# Patient Record
Sex: FEMALE | Race: WHITE | NOT HISPANIC OR LATINO
[De-identification: names, ages, dates, MRNs, and addresses within clinical notes are randomized per-mention and may not be internally consistent; named-entity substitution may affect disease eponyms.]

---

## 2017-02-15 ENCOUNTER — RECORD ABSTRACTING (OUTPATIENT)
Age: 62
End: 2017-02-15

## 2017-02-15 DIAGNOSIS — Z98.890 OTHER SPECIFIED POSTPROCEDURAL STATES: ICD-10-CM

## 2017-02-15 DIAGNOSIS — M25.562 PAIN IN LEFT KNEE: ICD-10-CM

## 2017-02-15 DIAGNOSIS — Z92.89 PERSONAL HISTORY OF OTHER MEDICAL TREATMENT: ICD-10-CM

## 2017-02-15 DIAGNOSIS — E66.3 OVERWEIGHT: ICD-10-CM

## 2017-02-15 DIAGNOSIS — N60.09 SOLITARY CYST OF UNSPECIFIED BREAST: ICD-10-CM

## 2017-02-15 DIAGNOSIS — Z78.9 OTHER SPECIFIED HEALTH STATUS: ICD-10-CM

## 2017-02-15 DIAGNOSIS — Z82.49 FAMILY HISTORY OF ISCHEMIC HEART DISEASE AND OTHER DISEASES OF THE CIRCULATORY SYSTEM: ICD-10-CM

## 2017-02-15 PROBLEM — Z00.00 ENCOUNTER FOR PREVENTIVE HEALTH EXAMINATION: Status: ACTIVE | Noted: 2017-02-15

## 2017-02-28 ENCOUNTER — RECORD ABSTRACTING (OUTPATIENT)
Age: 62
End: 2017-02-28

## 2017-02-28 DIAGNOSIS — Z87.59 PERSONAL HISTORY OF OTHER COMPLICATIONS OF PREGNANCY, CHILDBIRTH AND THE PUERPERIUM: ICD-10-CM

## 2017-02-28 DIAGNOSIS — Z78.0 ASYMPTOMATIC MENOPAUSAL STATE: ICD-10-CM

## 2017-02-28 DIAGNOSIS — O00.90 UNSPECIFIED. ECTOPIC. PREGNANCY WITHOUT INTRAUTERINE PREGNANCY: ICD-10-CM

## 2017-02-28 DIAGNOSIS — Z87.898 PERSONAL HISTORY OF OTHER SPECIFIED CONDITIONS: ICD-10-CM

## 2017-02-28 DIAGNOSIS — Z80.0 FAMILY HISTORY OF MALIGNANT NEOPLASM OF DIGESTIVE ORGANS: ICD-10-CM

## 2017-02-28 DIAGNOSIS — Z86.018 PERSONAL HISTORY OF OTHER BENIGN NEOPLASM: ICD-10-CM

## 2018-02-26 ENCOUNTER — TRANSCRIPTION ENCOUNTER (OUTPATIENT)
Age: 63
End: 2018-02-26

## 2018-02-26 ENCOUNTER — INPATIENT (INPATIENT)
Facility: HOSPITAL | Age: 63
LOS: 0 days | Discharge: HOME | End: 2018-02-26
Attending: INTERNAL MEDICINE

## 2018-02-26 VITALS
SYSTOLIC BLOOD PRESSURE: 143 MMHG | TEMPERATURE: 98 F | OXYGEN SATURATION: 96 % | DIASTOLIC BLOOD PRESSURE: 72 MMHG | HEART RATE: 71 BPM | RESPIRATION RATE: 18 BRPM

## 2018-02-26 VITALS
SYSTOLIC BLOOD PRESSURE: 171 MMHG | DIASTOLIC BLOOD PRESSURE: 78 MMHG | TEMPERATURE: 96 F | RESPIRATION RATE: 18 BRPM | HEART RATE: 73 BPM | OXYGEN SATURATION: 97 % | HEIGHT: 62 IN | WEIGHT: 293 LBS

## 2018-02-26 DIAGNOSIS — R10.13 EPIGASTRIC PAIN: ICD-10-CM

## 2018-02-26 DIAGNOSIS — R07.9 CHEST PAIN, UNSPECIFIED: ICD-10-CM

## 2018-02-26 LAB
ALBUMIN SERPL ELPH-MCNC: 4.2 G/DL — SIGNIFICANT CHANGE UP (ref 3–5.5)
ALP SERPL-CCNC: 43 U/L — SIGNIFICANT CHANGE UP (ref 30–115)
ALT FLD-CCNC: 29 U/L — SIGNIFICANT CHANGE UP (ref 0–41)
ANION GAP SERPL CALC-SCNC: 5 MMOL/L — LOW (ref 7–14)
APTT BLD: 32.3 SEC — SIGNIFICANT CHANGE UP (ref 27–39.2)
AST SERPL-CCNC: 29 U/L — SIGNIFICANT CHANGE UP (ref 0–41)
BILIRUB SERPL-MCNC: 0.9 MG/DL — SIGNIFICANT CHANGE UP (ref 0.2–1.2)
BUN SERPL-MCNC: 27 MG/DL — HIGH (ref 10–20)
CALCIUM SERPL-MCNC: 9.5 MG/DL — SIGNIFICANT CHANGE UP (ref 8.5–10.1)
CHLORIDE SERPL-SCNC: 109 MMOL/L — SIGNIFICANT CHANGE UP (ref 98–110)
CK MB BLD-MCNC: 1 % — SIGNIFICANT CHANGE UP (ref 0–4)
CK MB CFR SERPL CALC: 0.9 NG/ML — SIGNIFICANT CHANGE UP (ref 0.6–6.3)
CK SERPL-CCNC: 106 U/L — SIGNIFICANT CHANGE UP (ref 0–225)
CO2 SERPL-SCNC: 25 MMOL/L — SIGNIFICANT CHANGE UP (ref 17–32)
CREAT SERPL-MCNC: 0.6 MG/DL — LOW (ref 0.7–1.5)
GLUCOSE SERPL-MCNC: 104 MG/DL — SIGNIFICANT CHANGE UP (ref 70–110)
HCT VFR BLD CALC: 42 % — SIGNIFICANT CHANGE UP (ref 37–47)
HGB BLD-MCNC: 14.3 G/DL — SIGNIFICANT CHANGE UP (ref 14–18)
INR BLD: 0.98 RATIO — SIGNIFICANT CHANGE UP (ref 0.65–1.3)
LACTATE SERPL-SCNC: 1.2 MMOL/L — SIGNIFICANT CHANGE UP (ref 0.5–2.2)
LIDOCAIN IGE QN: 56 U/L — SIGNIFICANT CHANGE UP (ref 7–60)
MCHC RBC-ENTMCNC: 30.8 PG — SIGNIFICANT CHANGE UP (ref 27–31)
MCHC RBC-ENTMCNC: 34 G/DL — SIGNIFICANT CHANGE UP (ref 32–37)
MCV RBC AUTO: 90.5 FL — SIGNIFICANT CHANGE UP (ref 81–91)
NRBC # BLD: 0 /100 WBCS — SIGNIFICANT CHANGE UP (ref 0–0)
PLATELET # BLD AUTO: 151 K/UL — SIGNIFICANT CHANGE UP (ref 130–400)
POTASSIUM SERPL-MCNC: 4.7 MMOL/L — SIGNIFICANT CHANGE UP (ref 3.5–5)
POTASSIUM SERPL-SCNC: 4.7 MMOL/L — SIGNIFICANT CHANGE UP (ref 3.5–5)
PROT SERPL-MCNC: 7.3 G/DL — SIGNIFICANT CHANGE UP (ref 6–8)
PROTHROM AB SERPL-ACNC: 10.6 SEC — SIGNIFICANT CHANGE UP (ref 9.95–12.87)
RBC # BLD: 4.64 M/UL — SIGNIFICANT CHANGE UP (ref 4.2–5.4)
RBC # FLD: 12.8 % — SIGNIFICANT CHANGE UP (ref 11.5–14.5)
SODIUM SERPL-SCNC: 139 MMOL/L — SIGNIFICANT CHANGE UP (ref 135–146)
TROPONIN I SERPL-MCNC: <0.02 NG/ML — SIGNIFICANT CHANGE UP (ref 0–0.05)
WBC # BLD: 7.95 K/UL — SIGNIFICANT CHANGE UP (ref 4.8–10.8)
WBC # FLD AUTO: 7.95 K/UL — SIGNIFICANT CHANGE UP (ref 4.8–10.8)

## 2018-02-26 RX ORDER — ASPIRIN/CALCIUM CARB/MAGNESIUM 324 MG
325 TABLET ORAL ONCE
Qty: 0 | Refills: 0 | Status: COMPLETED | OUTPATIENT
Start: 2018-02-26 | End: 2018-02-26

## 2018-02-26 RX ORDER — PANTOPRAZOLE SODIUM 20 MG/1
40 TABLET, DELAYED RELEASE ORAL
Qty: 0 | Refills: 0 | Status: DISCONTINUED | OUTPATIENT
Start: 2018-02-26 | End: 2018-02-26

## 2018-02-26 RX ORDER — ONDANSETRON 8 MG/1
4 TABLET, FILM COATED ORAL ONCE
Qty: 0 | Refills: 0 | Status: COMPLETED | OUTPATIENT
Start: 2018-02-26 | End: 2018-02-26

## 2018-02-26 RX ORDER — SODIUM CHLORIDE 9 MG/ML
1000 INJECTION INTRAMUSCULAR; INTRAVENOUS; SUBCUTANEOUS ONCE
Qty: 0 | Refills: 0 | Status: COMPLETED | OUTPATIENT
Start: 2018-02-26 | End: 2018-02-26

## 2018-02-26 RX ORDER — FAMOTIDINE 10 MG/ML
20 INJECTION INTRAVENOUS ONCE
Qty: 0 | Refills: 0 | Status: COMPLETED | OUTPATIENT
Start: 2018-02-26 | End: 2018-02-26

## 2018-02-26 RX ORDER — PANTOPRAZOLE SODIUM 20 MG/1
1 TABLET, DELAYED RELEASE ORAL
Qty: 0 | Refills: 0 | DISCHARGE
Start: 2018-02-26

## 2018-02-26 RX ADMIN — PANTOPRAZOLE SODIUM 40 MILLIGRAM(S): 20 TABLET, DELAYED RELEASE ORAL at 06:40

## 2018-02-26 RX ADMIN — ONDANSETRON 4 MILLIGRAM(S): 8 TABLET, FILM COATED ORAL at 03:16

## 2018-02-26 RX ADMIN — SODIUM CHLORIDE 1000 MILLILITER(S): 9 INJECTION INTRAMUSCULAR; INTRAVENOUS; SUBCUTANEOUS at 04:40

## 2018-02-26 RX ADMIN — Medication 325 MILLIGRAM(S): at 03:16

## 2018-02-26 RX ADMIN — FAMOTIDINE 20 MILLIGRAM(S): 10 INJECTION INTRAVENOUS at 04:40

## 2018-02-26 NOTE — H&P ADULT - HISTORY OF PRESENT ILLNESS
· Chief Complaint: The patient is a 62y Female complaining of chest pain.	  · HPI Objective Statement: 61 y/o F without PM presents with persistent CP and abdominal pain x days. Last bowel movement today and normal without blood. no abdominal surgeries. She denies N/V/D, fevers, sweats, chills, leg pain/swelling, hx MI, SOB, SENA, cough, recent illness, palliating/provoking factors, sick contacts, recent travel.

## 2018-02-26 NOTE — H&P ADULT - ATTENDING COMMENTS
pt seen and examined independently. pt c/o epigastric burning CP, and abdominal discomfort for greater than a week. symptoms are not related to exertion, no history of CP in the past. no sob, no fever/chills, no nausea/vomiting, no diarrhea, no melena. symptoms are intermittent, exacerbated by lying down, and is described as burning. check serial CE, continue PPI, anti-reflux bland diet.   discussed with housestaff.

## 2018-02-26 NOTE — ED PROVIDER NOTE - PHYSICAL EXAMINATION
PHYSICAL EXAM:    GENERAL: Alert, appears stated age, well appearing, non-toxic  SKIN: Warm, pink and dry. MMM.   EYE: Normal lids/conjunctiva  ENT: Normal hearing, patent oropharynx without erythema or exudate  Pulm: Bilateral BS, normal resp effort, no wheezes, stridor, or retractions  CV: RRR, no M/R/G, 2+ pulses throughout  Abd: soft, non-distended, no hepatosplenomegaly. +epigastric TTP. no CVA tenderness.   Mskel: no erythema, cyanosis, edema  Neuro: AAOx3, no sensory/motor deficits. No speech slurring, pronator drift, facial asymmetry. 5/5 strength throughout. normal gait.

## 2018-02-26 NOTE — ED PROVIDER NOTE - OBJECTIVE STATEMENT
61 y/o F without PM presents with persistent CP and abdominal pain x days. Last bowel movement today and normal without blood. no abdominal surgeries. She denies N/V/D, fevers, sweats, chills, leg pain/swelling, hx MI, SOB, SENA, cough, recent illness, palliating/provoking factors, sick contacts, recent travel.

## 2018-02-26 NOTE — ED PROVIDER NOTE - NS ED ROS FT
Review of Systems    Constitutional: (-) fever  Eyes/ENT: (-) blurry vision  Cardiovascular: (+) chest pain, (-) syncope  Respiratory: (-) cough, (-) shortness of breath  Gastrointestinal: (-) vomiting, (-) diarrhea  Musculoskeletal: (-) neck pain, (-) back pain  Integumentary: (-) rash, (-) edema  Neurological: (-) headache, (-) altered mental status

## 2018-02-26 NOTE — ED PROVIDER NOTE - MEDICAL DECISION MAKING DETAILS
I personally evaluated the patient. I reviewed the Resident’s or Physician Assistant’s note (as assigned above), and agree with the findings and plan except as documented in my note.  Chart reviewed. H/O GERD presents with burning epigastric/chest pain for few days. Exam shows alert patient in no distress, HEENT NCAT, lungs clear, RR s!s@, abdomens oft mild epigastric tenderness +BS, no rebound guarding, no CCE. Labs, EKG, CXR and CT abdo negative. Will admit to low risk tele r/o ACS.

## 2018-02-26 NOTE — H&P ADULT - NSHPPHYSICALEXAM_GEN_ALL_CORE
GENERAL:  61y/o Female NAD, resting comfortably.  HEAD:  Atraumatic, Normocephalic  EYES: EOMI, PERRLA, conjunctiva and sclera clear  NECK: Supple, No JVD, no cervical lymphadenopathy, non-tender  CHEST/LUNG: Clear to auscultation bilaterally; No wheeze, rhonchi, or rales  HEART: Regular rate and rhythm; S1&S2  ABDOMEN: Soft, Nontender, Nondistended x 4 quadrants; Bowel sounds present  EXTREMITIES:   Peripheral Pulses Present, No clubbing, no cyanosis, or no edema, no calf tenderness  PSYCH: AAOx3, cooperative, appropriate  NEUROLOGY: WNL  SKIN: WNL

## 2018-02-26 NOTE — DISCHARGE NOTE ADULT - MEDICATION SUMMARY - MEDICATIONS TO TAKE
I will START or STAY ON the medications listed below when I get home from the hospital:    enalapril  -- Indication: For Hypertension    pantoprazole 40 mg oral delayed release tablet  -- 1 tab(s) by mouth once a day (before a meal)  -- Indication: For CHEST PAIN;EPIGASTRIC ABDOMINAL PAIN

## 2018-02-26 NOTE — DISCHARGE NOTE ADULT - PLAN OF CARE
resolution of symptoms continue meds, follow up with PMD. anti-reflux bland diet. follow up with PMD. c/w PPI

## 2018-02-26 NOTE — DISCHARGE NOTE ADULT - HOSPITAL COURSE
pt admitted with epigastric burning abdominal discomfort, exacerbated with foods and lying down. cardiac enzymes negative. pt may benefit from outpatient EGD. c/w PPI, avoid acidic foods/beverages. anti-reflux bland diet. continue meds, follow up with PMD.

## 2018-02-26 NOTE — H&P ADULT - ASSESSMENT
63 y/o c/o chest pain radiating to left arm and epigastric pain. 61 y/o c/o epigastric burning chest pain, abdominal pain

## 2018-02-26 NOTE — DISCHARGE NOTE ADULT - PATIENT PORTAL LINK FT
You can access the [facility] Patient Portal, offered by [facility], by registering with the following website: [email or web address]

## 2018-02-26 NOTE — DISCHARGE NOTE ADULT - CARE PLAN
Principal Discharge DX:	Epigastric abdominal pain  Goal:	resolution of symptoms  Assessment and plan of treatment:	continue meds, follow up with PMD. anti-reflux bland diet. follow up with PMD. c/w PPI

## 2018-02-26 NOTE — H&P ADULT - NSHPLABSRESULTS_GEN_ALL_CORE
14.3   7.95  )-----------( 151      ( 26 Feb 2018 01:50 )             42.0       02-26    139  |  109  |  27<H>  ----------------------------<  104  4.7   |  25  |  0.6<L>    Ca    9.5      26 Feb 2018 01:50    TPro  7.3  /  Alb  4.2  /  TBili  0.9  /  DBili  x   /  AST  29  /  ALT  29  /  AlkPhos  43  02-26                  PT/INR - ( 26 Feb 2018 01:50 )   PT: 10.60 sec;   INR: 0.98 ratio         PTT - ( 26 Feb 2018 01:50 )  PTT:32.3 sec    Lactate Trend  02-26 @ 01:50 Lactate:1.2       CARDIAC MARKERS ( 26 Feb 2018 01:50 )  <0.02 ng/mL / x     / 106 U/L / x     / 0.9 ng/mL        CAPILLARY BLOOD GLUCOSE

## 2018-02-28 DIAGNOSIS — R07.9 CHEST PAIN, UNSPECIFIED: ICD-10-CM

## 2018-02-28 DIAGNOSIS — R10.13 EPIGASTRIC PAIN: ICD-10-CM

## 2019-11-23 ENCOUNTER — EMERGENCY (EMERGENCY)
Facility: HOSPITAL | Age: 64
LOS: 0 days | Discharge: HOME | End: 2019-11-23
Attending: EMERGENCY MEDICINE | Admitting: EMERGENCY MEDICINE
Payer: SUBSIDIZED

## 2019-11-23 VITALS
TEMPERATURE: 98 F | SYSTOLIC BLOOD PRESSURE: 204 MMHG | DIASTOLIC BLOOD PRESSURE: 88 MMHG | RESPIRATION RATE: 17 BRPM | HEART RATE: 66 BPM | OXYGEN SATURATION: 98 %

## 2019-11-23 VITALS
RESPIRATION RATE: 18 BRPM | HEART RATE: 65 BPM | TEMPERATURE: 97 F | OXYGEN SATURATION: 96 % | SYSTOLIC BLOOD PRESSURE: 145 MMHG | DIASTOLIC BLOOD PRESSURE: 80 MMHG

## 2019-11-23 DIAGNOSIS — Z88.0 ALLERGY STATUS TO PENICILLIN: ICD-10-CM

## 2019-11-23 DIAGNOSIS — R10.32 LEFT LOWER QUADRANT PAIN: ICD-10-CM

## 2019-11-23 DIAGNOSIS — R10.9 UNSPECIFIED ABDOMINAL PAIN: ICD-10-CM

## 2019-11-23 DIAGNOSIS — R11.2 NAUSEA WITH VOMITING, UNSPECIFIED: ICD-10-CM

## 2019-11-23 DIAGNOSIS — R10.12 LEFT UPPER QUADRANT PAIN: ICD-10-CM

## 2019-11-23 PROBLEM — I10 ESSENTIAL (PRIMARY) HYPERTENSION: Chronic | Status: ACTIVE | Noted: 2018-02-26

## 2019-11-23 LAB
ALBUMIN SERPL ELPH-MCNC: 4.5 G/DL — SIGNIFICANT CHANGE UP (ref 3.5–5.2)
ALP SERPL-CCNC: 46 U/L — SIGNIFICANT CHANGE UP (ref 30–115)
ALT FLD-CCNC: 19 U/L — SIGNIFICANT CHANGE UP (ref 0–41)
ANION GAP SERPL CALC-SCNC: 17 MMOL/L — HIGH (ref 7–14)
APPEARANCE UR: CLEAR — SIGNIFICANT CHANGE UP
AST SERPL-CCNC: 22 U/L — SIGNIFICANT CHANGE UP (ref 0–41)
BASOPHILS # BLD AUTO: 0.04 K/UL — SIGNIFICANT CHANGE UP (ref 0–0.2)
BASOPHILS NFR BLD AUTO: 0.7 % — SIGNIFICANT CHANGE UP (ref 0–1)
BILIRUB SERPL-MCNC: 0.6 MG/DL — SIGNIFICANT CHANGE UP (ref 0.2–1.2)
BILIRUB UR-MCNC: NEGATIVE — SIGNIFICANT CHANGE UP
BUN SERPL-MCNC: 18 MG/DL — SIGNIFICANT CHANGE UP (ref 10–20)
CALCIUM SERPL-MCNC: 9.4 MG/DL — SIGNIFICANT CHANGE UP (ref 8.5–10.1)
CHLORIDE SERPL-SCNC: 99 MMOL/L — SIGNIFICANT CHANGE UP (ref 98–110)
CO2 SERPL-SCNC: 21 MMOL/L — SIGNIFICANT CHANGE UP (ref 17–32)
COLOR SPEC: COLORLESS — SIGNIFICANT CHANGE UP
CREAT SERPL-MCNC: 0.7 MG/DL — SIGNIFICANT CHANGE UP (ref 0.7–1.5)
DIFF PNL FLD: NEGATIVE — SIGNIFICANT CHANGE UP
EOSINOPHIL # BLD AUTO: 0.02 K/UL — SIGNIFICANT CHANGE UP (ref 0–0.7)
EOSINOPHIL NFR BLD AUTO: 0.4 % — SIGNIFICANT CHANGE UP (ref 0–8)
GLUCOSE SERPL-MCNC: 131 MG/DL — HIGH (ref 70–99)
GLUCOSE UR QL: NEGATIVE — SIGNIFICANT CHANGE UP
HCT VFR BLD CALC: 40.2 % — SIGNIFICANT CHANGE UP (ref 37–47)
HGB BLD-MCNC: 13.7 G/DL — SIGNIFICANT CHANGE UP (ref 12–16)
IMM GRANULOCYTES NFR BLD AUTO: 0.2 % — SIGNIFICANT CHANGE UP (ref 0.1–0.3)
KETONES UR-MCNC: ABNORMAL
LACTATE SERPL-SCNC: 1.4 MMOL/L — SIGNIFICANT CHANGE UP (ref 0.5–2.2)
LEUKOCYTE ESTERASE UR-ACNC: NEGATIVE — SIGNIFICANT CHANGE UP
LIDOCAIN IGE QN: 50 U/L — SIGNIFICANT CHANGE UP (ref 7–60)
LYMPHOCYTES # BLD AUTO: 1.28 K/UL — SIGNIFICANT CHANGE UP (ref 1.2–3.4)
LYMPHOCYTES # BLD AUTO: 22.6 % — SIGNIFICANT CHANGE UP (ref 20.5–51.1)
MCHC RBC-ENTMCNC: 31.4 PG — HIGH (ref 27–31)
MCHC RBC-ENTMCNC: 34.1 G/DL — SIGNIFICANT CHANGE UP (ref 32–37)
MCV RBC AUTO: 92 FL — SIGNIFICANT CHANGE UP (ref 81–99)
MONOCYTES # BLD AUTO: 0.17 K/UL — SIGNIFICANT CHANGE UP (ref 0.1–0.6)
MONOCYTES NFR BLD AUTO: 3 % — SIGNIFICANT CHANGE UP (ref 1.7–9.3)
NEUTROPHILS # BLD AUTO: 4.15 K/UL — SIGNIFICANT CHANGE UP (ref 1.4–6.5)
NEUTROPHILS NFR BLD AUTO: 73.1 % — SIGNIFICANT CHANGE UP (ref 42.2–75.2)
NITRITE UR-MCNC: NEGATIVE — SIGNIFICANT CHANGE UP
NRBC # BLD: 0 /100 WBCS — SIGNIFICANT CHANGE UP (ref 0–0)
PH UR: 6.5 — SIGNIFICANT CHANGE UP (ref 5–8)
PLATELET # BLD AUTO: 154 K/UL — SIGNIFICANT CHANGE UP (ref 130–400)
POTASSIUM SERPL-MCNC: 3.7 MMOL/L — SIGNIFICANT CHANGE UP (ref 3.5–5)
POTASSIUM SERPL-SCNC: 3.7 MMOL/L — SIGNIFICANT CHANGE UP (ref 3.5–5)
PROT SERPL-MCNC: 7.1 G/DL — SIGNIFICANT CHANGE UP (ref 6–8)
PROT UR-MCNC: NEGATIVE — SIGNIFICANT CHANGE UP
RBC # BLD: 4.37 M/UL — SIGNIFICANT CHANGE UP (ref 4.2–5.4)
RBC # FLD: 12.5 % — SIGNIFICANT CHANGE UP (ref 11.5–14.5)
SODIUM SERPL-SCNC: 137 MMOL/L — SIGNIFICANT CHANGE UP (ref 135–146)
SP GR SPEC: 1.02 — SIGNIFICANT CHANGE UP (ref 1.01–1.02)
UROBILINOGEN FLD QL: SIGNIFICANT CHANGE UP
WBC # BLD: 5.67 K/UL — SIGNIFICANT CHANGE UP (ref 4.8–10.8)
WBC # FLD AUTO: 5.67 K/UL — SIGNIFICANT CHANGE UP (ref 4.8–10.8)

## 2019-11-23 PROCEDURE — 71046 X-RAY EXAM CHEST 2 VIEWS: CPT | Mod: 26

## 2019-11-23 PROCEDURE — 99285 EMERGENCY DEPT VISIT HI MDM: CPT

## 2019-11-23 PROCEDURE — 93010 ELECTROCARDIOGRAM REPORT: CPT

## 2019-11-23 PROCEDURE — 74177 CT ABD & PELVIS W/CONTRAST: CPT | Mod: 26

## 2019-11-23 RX ORDER — ONDANSETRON 8 MG/1
4 TABLET, FILM COATED ORAL ONCE
Refills: 0 | Status: COMPLETED | OUTPATIENT
Start: 2019-11-23 | End: 2019-11-23

## 2019-11-23 RX ORDER — SODIUM CHLORIDE 9 MG/ML
1000 INJECTION INTRAMUSCULAR; INTRAVENOUS; SUBCUTANEOUS ONCE
Refills: 0 | Status: COMPLETED | OUTPATIENT
Start: 2019-11-23 | End: 2019-11-23

## 2019-11-23 RX ORDER — MORPHINE SULFATE 50 MG/1
4 CAPSULE, EXTENDED RELEASE ORAL ONCE
Refills: 0 | Status: DISCONTINUED | OUTPATIENT
Start: 2019-11-23 | End: 2019-11-23

## 2019-11-23 RX ADMIN — SODIUM CHLORIDE 1000 MILLILITER(S): 9 INJECTION INTRAMUSCULAR; INTRAVENOUS; SUBCUTANEOUS at 12:23

## 2019-11-23 RX ADMIN — ONDANSETRON 4 MILLIGRAM(S): 8 TABLET, FILM COATED ORAL at 12:19

## 2019-11-23 RX ADMIN — MORPHINE SULFATE 4 MILLIGRAM(S): 50 CAPSULE, EXTENDED RELEASE ORAL at 12:19

## 2019-11-23 NOTE — ED ADULT NURSE NOTE - NSIMPLEMENTINTERV_GEN_ALL_ED
Implemented All Fall Risk Interventions:  [place] to call system. Call bell, personal items and telephone within reach. Instruct patient to call for assistance. Room bathroom lighting operational. Non-slip footwear when patient is off stretcher. Physically safe environment: no spills, clutter or unnecessary equipment. Stretcher in lowest position, wheels locked, appropriate side rails in place. Provide visual cue, wrist band, yellow gown, etc. Monitor gait and stability. Monitor for mental status changes and reorient to person, place, and time. Review medications for side effects contributing to fall risk. Reinforce activity limits and safety measures with patient and family.

## 2019-11-23 NOTE — ED PROVIDER NOTE - NS ED ROS FT
Review of Systems   Constitutional:  No Weight Change, No Fever, No Chills, No weakness    ENT/Mouth:  No Nasal Congestion, No Hoarseness, No sore throat, No Rhinorrhea, No Swallowing Difficulty  Eyes:  No Eye Pain, No Swelling, No Redness, No Discharge, No Vision Changes  Cardiovascular:  No Chest Pain, No palpitations, No Dyspnea on Exertion, No Orthopnea, No  Respiratory:  No SOB, No Cough, No Wheezing  Gastrointestinal:  + Nausea, + Vomiting, No Diarrhea, No Constipation, + abdominal pain, No melena or bright red blood per rectum  Genitourinary:  No Dysuria, No Urinary Frequency, No Hematuria, No Urinary Incontinence,  Musculoskeletal:  No Arthralgias, No Myalgias, No Joint Swelling, No Joint Stiffness,  Skin:  No rashes

## 2019-11-23 NOTE — ED PROVIDER NOTE - CARE PLAN
Assessment and plan of treatment:	63 yo F presents to Ed for abdominal pain. Concern for diverticulitis. SBO considered but patient is having normal BM.   Will give pain medication, labs, and CT scan.

## 2019-11-23 NOTE — ED PROVIDER NOTE - OBJECTIVE STATEMENT
65 yo F presents to ED for abdominal pain that started 3 days ago but has gotten progressively worse. Patient states the pain is on the L side and is non-radiating. Associated with nausea and vomiting. Pain is worse with movement, palpation and laying on her L side. No diarrhea or constipation. Normal flatus. No dysuria or hematuria. No history of kidney stones.   Past abdominal surgical history significant for laparoscopic cholecystectomy.   No CP or SOB. No back pain.

## 2019-11-23 NOTE — ED PROVIDER NOTE - PHYSICAL EXAMINATION
Const: Well nourished, well developed, appears stated age. Patient appears uncomfortable   Eyes: PERRL, no conjunctival injection  HENT:  Neck supple without meningismus   CV: RRR, Warm, well-perfused extremities  RESP: CTA B/L, no tachypnea   GI: soft, L sided abdominal pain in upper and lower quadrants. No CVA tenderness.   MSK: No gross deformities appreciated  Skin: Warm, dry. No rashes  Neuro: Alert, CNs II-XII grossly intact. Sensation and motor function of extremities grossly intact.  Psych: Appropriate mood and affect.

## 2019-11-23 NOTE — ED PROVIDER NOTE - PLAN OF CARE
65 yo F presents to Ed for abdominal pain. Concern for diverticulitis. SBO considered but patient is having normal BM.   Will give pain medication, labs, and CT scan.

## 2019-11-23 NOTE — ED PROVIDER NOTE - PATIENT PORTAL LINK FT
You can access the FollowMyHealth Patient Portal offered by [facility] by registering at the following website: [email or web address]. By joining [employer]’s FollowMyHealth portal, you will also be able to view your health information using other applications (apps) compatible with our system.

## 2020-12-15 ENCOUNTER — RESULT REVIEW (OUTPATIENT)
Age: 65
End: 2020-12-15

## 2020-12-15 ENCOUNTER — OUTPATIENT (OUTPATIENT)
Dept: OUTPATIENT SERVICES | Facility: HOSPITAL | Age: 65
LOS: 1 days | Discharge: HOME | End: 2020-12-15
Payer: MEDICARE

## 2020-12-15 VITALS
DIASTOLIC BLOOD PRESSURE: 74 MMHG | WEIGHT: 194.01 LBS | RESPIRATION RATE: 16 BRPM | TEMPERATURE: 98 F | OXYGEN SATURATION: 96 % | HEIGHT: 63 IN | HEART RATE: 69 BPM | SYSTOLIC BLOOD PRESSURE: 153 MMHG

## 2020-12-15 DIAGNOSIS — Z90.49 ACQUIRED ABSENCE OF OTHER SPECIFIED PARTS OF DIGESTIVE TRACT: Chronic | ICD-10-CM

## 2020-12-15 DIAGNOSIS — M17.11 UNILATERAL PRIMARY OSTEOARTHRITIS, RIGHT KNEE: ICD-10-CM

## 2020-12-15 DIAGNOSIS — Z01.818 ENCOUNTER FOR OTHER PREPROCEDURAL EXAMINATION: ICD-10-CM

## 2020-12-15 DIAGNOSIS — Z87.59 PERSONAL HISTORY OF OTHER COMPLICATIONS OF PREGNANCY, CHILDBIRTH AND THE PUERPERIUM: Chronic | ICD-10-CM

## 2020-12-15 LAB
A1C WITH ESTIMATED AVERAGE GLUCOSE RESULT: 5.8 % — HIGH (ref 4–5.6)
ALBUMIN SERPL ELPH-MCNC: 4.2 G/DL — SIGNIFICANT CHANGE UP (ref 3.5–5.2)
ALP SERPL-CCNC: 56 U/L — SIGNIFICANT CHANGE UP (ref 30–115)
ALT FLD-CCNC: 17 U/L — SIGNIFICANT CHANGE UP (ref 0–41)
ANION GAP SERPL CALC-SCNC: 9 MMOL/L — SIGNIFICANT CHANGE UP (ref 7–14)
APTT BLD: 36.8 SEC — SIGNIFICANT CHANGE UP (ref 27–39.2)
AST SERPL-CCNC: 23 U/L — SIGNIFICANT CHANGE UP (ref 0–41)
BASOPHILS # BLD AUTO: 0.06 K/UL — SIGNIFICANT CHANGE UP (ref 0–0.2)
BASOPHILS NFR BLD AUTO: 1.2 % — HIGH (ref 0–1)
BILIRUB SERPL-MCNC: 0.4 MG/DL — SIGNIFICANT CHANGE UP (ref 0.2–1.2)
BUN SERPL-MCNC: 20 MG/DL — SIGNIFICANT CHANGE UP (ref 10–20)
CALCIUM SERPL-MCNC: 9.8 MG/DL — SIGNIFICANT CHANGE UP (ref 8.5–10.1)
CHLORIDE SERPL-SCNC: 103 MMOL/L — SIGNIFICANT CHANGE UP (ref 98–110)
CO2 SERPL-SCNC: 28 MMOL/L — SIGNIFICANT CHANGE UP (ref 17–32)
CREAT SERPL-MCNC: 0.9 MG/DL — SIGNIFICANT CHANGE UP (ref 0.7–1.5)
EOSINOPHIL # BLD AUTO: 0.25 K/UL — SIGNIFICANT CHANGE UP (ref 0–0.7)
EOSINOPHIL NFR BLD AUTO: 4.8 % — SIGNIFICANT CHANGE UP (ref 0–8)
ESTIMATED AVERAGE GLUCOSE: 120 MG/DL — HIGH (ref 68–114)
GLUCOSE SERPL-MCNC: 111 MG/DL — HIGH (ref 70–99)
HCT VFR BLD CALC: 38.5 % — SIGNIFICANT CHANGE UP (ref 37–47)
HGB BLD-MCNC: 12.9 G/DL — SIGNIFICANT CHANGE UP (ref 12–16)
IMM GRANULOCYTES NFR BLD AUTO: 0.2 % — SIGNIFICANT CHANGE UP (ref 0.1–0.3)
INR BLD: 0.92 RATIO — SIGNIFICANT CHANGE UP (ref 0.65–1.3)
LYMPHOCYTES # BLD AUTO: 1.67 K/UL — SIGNIFICANT CHANGE UP (ref 1.2–3.4)
LYMPHOCYTES # BLD AUTO: 32.4 % — SIGNIFICANT CHANGE UP (ref 20.5–51.1)
MCHC RBC-ENTMCNC: 30.8 PG — SIGNIFICANT CHANGE UP (ref 27–31)
MCHC RBC-ENTMCNC: 33.5 G/DL — SIGNIFICANT CHANGE UP (ref 32–37)
MCV RBC AUTO: 91.9 FL — SIGNIFICANT CHANGE UP (ref 81–99)
MONOCYTES # BLD AUTO: 0.4 K/UL — SIGNIFICANT CHANGE UP (ref 0.1–0.6)
MONOCYTES NFR BLD AUTO: 7.8 % — SIGNIFICANT CHANGE UP (ref 1.7–9.3)
MRSA PCR RESULT.: NEGATIVE — SIGNIFICANT CHANGE UP
NEUTROPHILS # BLD AUTO: 2.77 K/UL — SIGNIFICANT CHANGE UP (ref 1.4–6.5)
NEUTROPHILS NFR BLD AUTO: 53.6 % — SIGNIFICANT CHANGE UP (ref 42.2–75.2)
NRBC # BLD: 0 /100 WBCS — SIGNIFICANT CHANGE UP (ref 0–0)
PLATELET # BLD AUTO: 162 K/UL — SIGNIFICANT CHANGE UP (ref 130–400)
POTASSIUM SERPL-MCNC: 4.1 MMOL/L — SIGNIFICANT CHANGE UP (ref 3.5–5)
POTASSIUM SERPL-SCNC: 4.1 MMOL/L — SIGNIFICANT CHANGE UP (ref 3.5–5)
PROT SERPL-MCNC: 7.1 G/DL — SIGNIFICANT CHANGE UP (ref 6–8)
PROTHROM AB SERPL-ACNC: 10.6 SEC — SIGNIFICANT CHANGE UP (ref 9.95–12.87)
RBC # BLD: 4.19 M/UL — LOW (ref 4.2–5.4)
RBC # FLD: 12.9 % — SIGNIFICANT CHANGE UP (ref 11.5–14.5)
SODIUM SERPL-SCNC: 140 MMOL/L — SIGNIFICANT CHANGE UP (ref 135–146)
WBC # BLD: 5.16 K/UL — SIGNIFICANT CHANGE UP (ref 4.8–10.8)
WBC # FLD AUTO: 5.16 K/UL — SIGNIFICANT CHANGE UP (ref 4.8–10.8)

## 2020-12-15 PROCEDURE — 93010 ELECTROCARDIOGRAM REPORT: CPT

## 2020-12-15 PROCEDURE — 72170 X-RAY EXAM OF PELVIS: CPT | Mod: 26

## 2020-12-15 PROCEDURE — 71046 X-RAY EXAM CHEST 2 VIEWS: CPT | Mod: 26

## 2020-12-15 PROCEDURE — 73562 X-RAY EXAM OF KNEE 3: CPT | Mod: 26,RT

## 2020-12-15 NOTE — H&P PST ADULT - NSICDXFAMILYHX_GEN_ALL_CORE_FT
FAMILY HISTORY:  Father  Still living? Unknown  Family history of esophageal cancer, Age at diagnosis: Age Unknown

## 2020-12-15 NOTE — H&P PST ADULT - REASON FOR ADMISSION
66 yo female presents for PAST in preparation for right total knee replacement on 1/4/2021 under regional anesthesia by Dr. Ahuja

## 2020-12-15 NOTE — H&P PST ADULT - HISTORY OF PRESENT ILLNESS
Pt has hx of OA. Pt complains of pain rated 9/10 of right knee and states "pain prevents me from walking properly." Denies any chest pain, difficulty breathing, SOB, palpitations, dysuria, URI, or any other infections in the last 2 weeks. Denies any recent travel, contact, or exposure to any persons with known or suspected COVID-19. Pt also denies COVID testing within the last 2 weeks. Denies any suicidal or homicidal ideations. Pt advised to self quarantine until day of procedure. Exercise tolerance of 1 flights of stairs without dyspnea. PRISCA reviewed with patient. Pt verbalized understanding of all pre-operative instructions.    Anesthesia Alert  NO--Difficult Airway  NO--History of neck surgery or radiation  NO--Limited ROM of neck  NO--History of Malignant hyperthermia  NO--No personal or family history of Pseudocholinesterase deficiency.  NO--Prior Anesthesia Complication  NO--Latex Allergy  NO--Loose teeth  NO--History of Rheumatoid Arthritis  NO--PRISCA  NO--Other_____

## 2021-01-01 ENCOUNTER — OUTPATIENT (OUTPATIENT)
Dept: OUTPATIENT SERVICES | Facility: HOSPITAL | Age: 66
LOS: 1 days | Discharge: HOME | End: 2021-01-01

## 2021-01-01 DIAGNOSIS — Z11.59 ENCOUNTER FOR SCREENING FOR OTHER VIRAL DISEASES: ICD-10-CM

## 2021-01-01 DIAGNOSIS — Z90.49 ACQUIRED ABSENCE OF OTHER SPECIFIED PARTS OF DIGESTIVE TRACT: Chronic | ICD-10-CM

## 2021-01-01 DIAGNOSIS — Z87.59 PERSONAL HISTORY OF OTHER COMPLICATIONS OF PREGNANCY, CHILDBIRTH AND THE PUERPERIUM: Chronic | ICD-10-CM

## 2021-01-01 PROBLEM — F32.9 MAJOR DEPRESSIVE DISORDER, SINGLE EPISODE, UNSPECIFIED: Chronic | Status: ACTIVE | Noted: 2020-12-15

## 2021-01-01 PROBLEM — K21.9 GASTRO-ESOPHAGEAL REFLUX DISEASE WITHOUT ESOPHAGITIS: Chronic | Status: ACTIVE | Noted: 2020-12-15

## 2021-01-04 ENCOUNTER — OUTPATIENT (OUTPATIENT)
Dept: OUTPATIENT SERVICES | Facility: HOSPITAL | Age: 66
LOS: 1 days | Discharge: HOME | End: 2021-01-04

## 2021-01-04 ENCOUNTER — RESULT REVIEW (OUTPATIENT)
Age: 66
End: 2021-01-04

## 2021-01-04 ENCOUNTER — INPATIENT (INPATIENT)
Facility: HOSPITAL | Age: 66
LOS: 0 days | Discharge: HOME HEALTH | End: 2021-01-05
Attending: ORTHOPAEDIC SURGERY | Admitting: ORTHOPAEDIC SURGERY
Payer: MEDICARE

## 2021-01-04 VITALS
WEIGHT: 184.97 LBS | HEART RATE: 71 BPM | HEIGHT: 62 IN | RESPIRATION RATE: 18 BRPM | TEMPERATURE: 96 F | OXYGEN SATURATION: 96 % | SYSTOLIC BLOOD PRESSURE: 156 MMHG | DIASTOLIC BLOOD PRESSURE: 70 MMHG

## 2021-01-04 DIAGNOSIS — F32.9 MAJOR DEPRESSIVE DISORDER, SINGLE EPISODE, UNSPECIFIED: ICD-10-CM

## 2021-01-04 DIAGNOSIS — Z90.49 ACQUIRED ABSENCE OF OTHER SPECIFIED PARTS OF DIGESTIVE TRACT: Chronic | ICD-10-CM

## 2021-01-04 DIAGNOSIS — Z88.0 ALLERGY STATUS TO PENICILLIN: ICD-10-CM

## 2021-01-04 DIAGNOSIS — M17.11 UNILATERAL PRIMARY OSTEOARTHRITIS, RIGHT KNEE: ICD-10-CM

## 2021-01-04 DIAGNOSIS — Z87.59 PERSONAL HISTORY OF OTHER COMPLICATIONS OF PREGNANCY, CHILDBIRTH AND THE PUERPERIUM: Chronic | ICD-10-CM

## 2021-01-04 DIAGNOSIS — Z79.82 LONG TERM (CURRENT) USE OF ASPIRIN: ICD-10-CM

## 2021-01-04 DIAGNOSIS — I10 ESSENTIAL (PRIMARY) HYPERTENSION: ICD-10-CM

## 2021-01-04 PROCEDURE — 73560 X-RAY EXAM OF KNEE 1 OR 2: CPT | Mod: 26,RT

## 2021-01-04 PROCEDURE — 99233 SBSQ HOSP IP/OBS HIGH 50: CPT

## 2021-01-04 PROCEDURE — 88311 DECALCIFY TISSUE: CPT | Mod: 26

## 2021-01-04 PROCEDURE — 88304 TISSUE EXAM BY PATHOLOGIST: CPT | Mod: 26

## 2021-01-04 RX ORDER — ACETAMINOPHEN 500 MG
650 TABLET ORAL EVERY 6 HOURS
Refills: 0 | Status: DISCONTINUED | OUTPATIENT
Start: 2021-01-04 | End: 2021-01-05

## 2021-01-04 RX ORDER — ONDANSETRON 8 MG/1
4 TABLET, FILM COATED ORAL EVERY 6 HOURS
Refills: 0 | Status: DISCONTINUED | OUTPATIENT
Start: 2021-01-04 | End: 2021-01-05

## 2021-01-04 RX ORDER — CHLORHEXIDINE GLUCONATE 213 G/1000ML
1 SOLUTION TOPICAL
Refills: 0 | Status: DISCONTINUED | OUTPATIENT
Start: 2021-01-04 | End: 2021-01-05

## 2021-01-04 RX ORDER — SODIUM CHLORIDE 9 MG/ML
1000 INJECTION INTRAMUSCULAR; INTRAVENOUS; SUBCUTANEOUS
Refills: 0 | Status: DISCONTINUED | OUTPATIENT
Start: 2021-01-04 | End: 2021-01-05

## 2021-01-04 RX ORDER — VANCOMYCIN HCL 1 G
1250 VIAL (EA) INTRAVENOUS ONCE
Refills: 0 | Status: DISCONTINUED | OUTPATIENT
Start: 2021-01-04 | End: 2021-01-05

## 2021-01-04 RX ORDER — CELECOXIB 200 MG/1
400 CAPSULE ORAL ONCE
Refills: 0 | Status: COMPLETED | OUTPATIENT
Start: 2021-01-04 | End: 2021-01-04

## 2021-01-04 RX ORDER — TRAMADOL HYDROCHLORIDE 50 MG/1
50 TABLET ORAL EVERY 4 HOURS
Refills: 0 | Status: DISCONTINUED | OUTPATIENT
Start: 2021-01-04 | End: 2021-01-05

## 2021-01-04 RX ORDER — HYDROCHLOROTHIAZIDE 25 MG
12.5 TABLET ORAL DAILY
Refills: 0 | Status: DISCONTINUED | OUTPATIENT
Start: 2021-01-06 | End: 2021-01-05

## 2021-01-04 RX ORDER — VANCOMYCIN HCL 1 G
1250 VIAL (EA) INTRAVENOUS EVERY 8 HOURS
Refills: 0 | Status: COMPLETED | OUTPATIENT
Start: 2021-01-04 | End: 2021-01-04

## 2021-01-04 RX ORDER — CELECOXIB 200 MG/1
200 CAPSULE ORAL EVERY 12 HOURS
Refills: 0 | Status: DISCONTINUED | OUTPATIENT
Start: 2021-01-05 | End: 2021-01-05

## 2021-01-04 RX ORDER — ASPIRIN/CALCIUM CARB/MAGNESIUM 324 MG
81 TABLET ORAL
Refills: 0 | Status: DISCONTINUED | OUTPATIENT
Start: 2021-01-04 | End: 2021-01-05

## 2021-01-04 RX ORDER — ESCITALOPRAM OXALATE 10 MG/1
10 TABLET, FILM COATED ORAL DAILY
Refills: 0 | Status: DISCONTINUED | OUTPATIENT
Start: 2021-01-04 | End: 2021-01-05

## 2021-01-04 RX ORDER — SENNA PLUS 8.6 MG/1
2 TABLET ORAL AT BEDTIME
Refills: 0 | Status: DISCONTINUED | OUTPATIENT
Start: 2021-01-04 | End: 2021-01-05

## 2021-01-04 RX ORDER — ONDANSETRON 8 MG/1
4 TABLET, FILM COATED ORAL ONCE
Refills: 0 | Status: DISCONTINUED | OUTPATIENT
Start: 2021-01-04 | End: 2021-01-04

## 2021-01-04 RX ORDER — MAGNESIUM HYDROXIDE 400 MG/1
30 TABLET, CHEWABLE ORAL DAILY
Refills: 0 | Status: DISCONTINUED | OUTPATIENT
Start: 2021-01-04 | End: 2021-01-05

## 2021-01-04 RX ORDER — LANOLIN ALCOHOL/MO/W.PET/CERES
3 CREAM (GRAM) TOPICAL ONCE
Refills: 0 | Status: COMPLETED | OUTPATIENT
Start: 2021-01-04 | End: 2021-01-04

## 2021-01-04 RX ORDER — HYDROMORPHONE HYDROCHLORIDE 2 MG/ML
0.5 INJECTION INTRAMUSCULAR; INTRAVENOUS; SUBCUTANEOUS
Refills: 0 | Status: DISCONTINUED | OUTPATIENT
Start: 2021-01-04 | End: 2021-01-04

## 2021-01-04 RX ORDER — ACETAMINOPHEN 500 MG
1000 TABLET ORAL ONCE
Refills: 0 | Status: COMPLETED | OUTPATIENT
Start: 2021-01-04 | End: 2021-01-04

## 2021-01-04 RX ORDER — SODIUM CHLORIDE 9 MG/ML
1000 INJECTION, SOLUTION INTRAVENOUS
Refills: 0 | Status: DISCONTINUED | OUTPATIENT
Start: 2021-01-04 | End: 2021-01-04

## 2021-01-04 RX ORDER — PANTOPRAZOLE SODIUM 20 MG/1
40 TABLET, DELAYED RELEASE ORAL
Refills: 0 | Status: DISCONTINUED | OUTPATIENT
Start: 2021-01-04 | End: 2021-01-05

## 2021-01-04 RX ORDER — KETOROLAC TROMETHAMINE 30 MG/ML
15 SYRINGE (ML) INJECTION EVERY 6 HOURS
Refills: 0 | Status: DISCONTINUED | OUTPATIENT
Start: 2021-01-04 | End: 2021-01-05

## 2021-01-04 RX ADMIN — HYDROMORPHONE HYDROCHLORIDE 0.5 MILLIGRAM(S): 2 INJECTION INTRAMUSCULAR; INTRAVENOUS; SUBCUTANEOUS at 11:05

## 2021-01-04 RX ADMIN — Medication 166.67 MILLIGRAM(S): at 22:28

## 2021-01-04 RX ADMIN — HYDROMORPHONE HYDROCHLORIDE 0.5 MILLIGRAM(S): 2 INJECTION INTRAMUSCULAR; INTRAVENOUS; SUBCUTANEOUS at 10:43

## 2021-01-04 RX ADMIN — Medication 5 MILLIGRAM(S): at 11:59

## 2021-01-04 RX ADMIN — CELECOXIB 400 MILLIGRAM(S): 200 CAPSULE ORAL at 06:26

## 2021-01-04 RX ADMIN — Medication 650 MILLIGRAM(S): at 17:34

## 2021-01-04 RX ADMIN — SODIUM CHLORIDE 75 MILLILITER(S): 9 INJECTION INTRAMUSCULAR; INTRAVENOUS; SUBCUTANEOUS at 14:35

## 2021-01-04 RX ADMIN — HYDROMORPHONE HYDROCHLORIDE 0.5 MILLIGRAM(S): 2 INJECTION INTRAMUSCULAR; INTRAVENOUS; SUBCUTANEOUS at 10:53

## 2021-01-04 RX ADMIN — Medication 650 MILLIGRAM(S): at 17:28

## 2021-01-04 RX ADMIN — Medication 166.67 MILLIGRAM(S): at 15:18

## 2021-01-04 RX ADMIN — Medication 15 MILLIGRAM(S): at 17:34

## 2021-01-04 RX ADMIN — SODIUM CHLORIDE 75 MILLILITER(S): 9 INJECTION, SOLUTION INTRAVENOUS at 10:46

## 2021-01-04 RX ADMIN — TRAMADOL HYDROCHLORIDE 50 MILLIGRAM(S): 50 TABLET ORAL at 20:16

## 2021-01-04 RX ADMIN — ESCITALOPRAM OXALATE 10 MILLIGRAM(S): 10 TABLET, FILM COATED ORAL at 11:53

## 2021-01-04 RX ADMIN — HYDROMORPHONE HYDROCHLORIDE 0.5 MILLIGRAM(S): 2 INJECTION INTRAMUSCULAR; INTRAVENOUS; SUBCUTANEOUS at 11:04

## 2021-01-04 RX ADMIN — SENNA PLUS 2 TABLET(S): 8.6 TABLET ORAL at 21:56

## 2021-01-04 RX ADMIN — Medication 650 MILLIGRAM(S): at 11:53

## 2021-01-04 RX ADMIN — TRAMADOL HYDROCHLORIDE 50 MILLIGRAM(S): 50 TABLET ORAL at 20:46

## 2021-01-04 RX ADMIN — CELECOXIB 400 MILLIGRAM(S): 200 CAPSULE ORAL at 12:04

## 2021-01-04 RX ADMIN — HYDROMORPHONE HYDROCHLORIDE 0.5 MILLIGRAM(S): 2 INJECTION INTRAMUSCULAR; INTRAVENOUS; SUBCUTANEOUS at 11:03

## 2021-01-04 RX ADMIN — Medication 15 MILLIGRAM(S): at 11:54

## 2021-01-04 RX ADMIN — Medication 81 MILLIGRAM(S): at 17:28

## 2021-01-04 RX ADMIN — Medication 3 MILLIGRAM(S): at 21:56

## 2021-01-04 RX ADMIN — Medication 1000 MILLIGRAM(S): at 06:26

## 2021-01-04 RX ADMIN — Medication 15 MILLIGRAM(S): at 17:27

## 2021-01-04 NOTE — CONSULT NOTE ADULT - SUBJECTIVE AND OBJECTIVE BOX
CC.  S/P right Total knee replacement   HPI.  Patient reports right knee pain controlled. Offers no other complaints    Constitutional: No fever, fatigue or weight loss.  Skin: No rash.  Eyes: No recent vision problems or eye pain.  ENT: No congestion, ear pain, or sore throat.  Endocrine: No thyroid problems.  Cardiovascular: No chest pain or palpation.  Respiratory: No cough, shortness of breath, congestion, or wheezing.  Gastrointestinal: No abdominal pain, nausea, vomiting, or diarrhea.  Genitourinary: No dysuria.  Musculoskeletal: No joint swelling.  Neurologic: No headache.       Allergies/Medications:   Allergies:        Allergies:  	penicillin: Drug, Anaphylaxis, unknown    Home Medications:   * Patient Currently Takes Medications as of 15-Dec-2020 09:10 documented in Structured Notes  · 	enalapril-hydrochlorothiazide 5 mg-12.5 mg oral tablet: Last Dose Taken:  , 1 tab(s) orally once a day  · 	escitalopram 10 mg oral tablet: Last Dose Taken:  , 1 tab(s) orally once a day  · 	Dexilant 60 mg oral delayed release capsule: Last Dose Taken:  , 1 cap(s) orally once a day  · 	Aspir 81 oral delayed release tablet: Last Dose Taken:  , 1 tab(s) orally once a day    PMH/PSH/FH/SH:    Past Medical, Past Surgical, and Family History:  PAST MEDICAL HISTORY:  Depression     GERD (gastroesophageal reflux disease)     Hypertension.     PAST SURGICAL HISTORY:  S/P appendectomy     S/P cholecystectomy     S/P ectopic pregnancy.     FAMILY HISTORY:  Father  Still living? Unknown  Family history of esophageal cancer, Age at diagnosis: Age Unknown.     Social History:  · Marital Status	  · Lives With	spouse  denies any ETOH/Tob/Illicit drug usage    Vital Signs Last 24 Hrs  T(C): 36 (04 Jan 2021 14:00), Max: 36.4 (04 Jan 2021 10:10)  T(F): 96.8 (04 Jan 2021 14:00), Max: 97.5 (04 Jan 2021 10:10)  HR: 67 (04 Jan 2021 14:00) (66 - 78)  BP: 141/80 (04 Jan 2021 14:00) (113/55 - 167/80)  BP(mean): --  RR: 16 (04 Jan 2021 14:00) (16 - 18)  SpO2: 98% (04 Jan 2021 13:00) (96% - 99%)    PHYSICAL EXAM-  GENERAL: NAD, well-groomed, well-developed  HEAD:  Atraumatic, Normocephalic  EYES: EOMI, PERRLA, conjunctiva and sclera clear  NECK: Supple, No JVD, Normal thyroid  NERVOUS SYSTEM:  Alert & Oriented X3, Motor Strength 5/5 B/L upper and lower extremities; DTRs 2+ intact and symmetric  CHEST/LUNG: Clear to percussion bilaterally; No rales, rhonchi, wheezing, or rubs  HEART: Regular rate and rhythm; No murmurs, rubs, or gallops  ABDOMEN: Soft, Nontender, Nondistended; Bowel sounds present  EXTREMITIES:  2+ Peripheral Pulses, No clubbing, cyanosis, or edema  SKIN: No rashes or lesions      Imaging Personally Reviewed:     [x ] YES  [ ] NO    Consultant(s) Notes Reviewed:  [x ] YES  [ ] NO    Care Discussed with Consultants/Other Providers [x ] YES  [ ] NO     No

## 2021-01-04 NOTE — PHYSICAL THERAPY INITIAL EVALUATION ADULT - IMPAIRED TRANSFERS: SIT/STAND, REHAB EVAL
decreased endurance/impaired balance/decreased flexibility/pain/impaired postural control/decreased ROM/decreased strength

## 2021-01-04 NOTE — PHYSICAL THERAPY INITIAL EVALUATION ADULT - GENERAL OBSERVATIONS, REHAB EVAL
14:10 - 14:40. Chart reviewed. Patient available to be seen for physical therapy, confirmed with nurse. Patient encountered already out of bed in chair, +ace wrap RLE with (R) knee aquacel dressing underneath, C/o (R) knee pain 3/10. Agreeable for PT evaluation now.

## 2021-01-04 NOTE — PATIENT PROFILE ADULT - NSPROPOAURINARYCATHETER_GEN_A_NUR
Telephone Encounter by Danny Castro at 09/26/18 03:59 PM     Author:  Danny Castro Service:  (none) Author Type:  Certified Medical Assistant     Filed:  09/26/18 03:59 PM Encounter Date:  9/26/2018 Status:  Signed     :  Tio Oliver (Certified Medical Assistant)       From: Yovana Wynn  To: Wellington Hampton MD  Sent: 9/26/2018  3:45 PM CDT  Subject: After-Visit Questions    Remicade treatment done monday doing well. What about prednisone can i   stop and how. Revision History        Date/Time User Provider Type Action    > 09/26/18 03:59 PM Tio Oliver Certified Medical Assistant Sign    Attribution information within the note text is not available.
no

## 2021-01-04 NOTE — ASU PATIENT PROFILE, ADULT - PMH
67 year old male with PMHx of b/l hip replacements, multiple CVA last one on 10/13/20, currently in inpatient rehab, for physical therapy. GI consulted for PEG-tube placement.      # Dysphagia due to multiple stroke:  - Currently on NG-Tube feed  - Patient is on DAPT therapy ( ASA + Plavix)  - Failed speech and swallow for PO intake    Rec:  - continue with NG-tube feeding  - Please call Neuro, if patient can be off plavix for 5 days for PEG-tube placement  - If cleared by neurology , will plan for PEG-tube placement next week after being off plavix for 5 days ( can continue ASA)   - Plan discussed with patient and his wife Jigna 67 year old male with PMHx of b/l hip replacements, multiple CVA last one on 10/13/20, currently in inpatient rehab, for physical therapy. GI consulted for PEG-tube placement.      # Dysphagia due to multiple stroke:  - Currently on NG-Tube feed  - Patient is on DAPT therapy ( ASA + Plavix)  - Failed speech and swallow for PO intake    Rec:  - continue with NG-tube feeding  - Please call Neuro, if patient can be off plavix for 5 days for PEG-tube placement  - If cleared by neurology , will plan for PEG-tube placement next week (10/27) after being off plavix for 5 days ( can continue ASA)   - Plan discussed with patient and his wife Jigna Depression    GERD (gastroesophageal reflux disease)    Hypertension     Depression    GERD (gastroesophageal reflux disease)    Hypertension    PRISCA on CPAP

## 2021-01-04 NOTE — CONSULT NOTE ADULT - ASSESSMENT
Patient is 64 yo female with hx of Depression, GERD (gastroesophageal reflux disease), and HTN presenting with       1.  Right Total knee replacement  - Continue with pain management, DVT proph, and wound care as per Ortho.  PT/OT    2. HTN/GERD/Depression  -Continue with home medications    Plan of care was discussed with patient, in great details, All questions were answered to their satisfication.  Seems to understand, and in agreement

## 2021-01-04 NOTE — ASU PREOP CHECKLIST - BP NONINVASIVE DIASTOLIC (MM HG)
Medtronic Adapta (D) Remote PPM Device Check  AP: >99 % : 6 %  Mode: AAIR<->DDDR        Presenting Rhythm: AP/VS  Heart Rate: Adequate rates per histogram  Sensing: Stable    Pacing Threshold: Stable    Impedance: Stable  Battery Status: 11-14 years  Atrial Arrhythmia: 8 mode switch episodes. 2 EGMs show PAF. Longest episode lasted 1 minute 7 seconds. Ventricular rates in the 120's during mode switch. Taking Eliquis.   Ventricular Arrhythmia: None     Care Plan: F/u [facility] q 3 months. Sent letter with results. RONALD Quinn                     
70

## 2021-01-04 NOTE — CHART NOTE - NSCHARTNOTEFT_GEN_A_CORE
PACU ANESTHESIA ADMISSION NOTE      Procedure: Right total knee replacement  Post op diagnosis: OA right knee     ____  Intubated  TV:______       Rate: ______      FiO2: ______    ____  Patent Airway    ____  Full return of protective reflexes    ____  Full recovery from anesthesia / back to baseline status    Vitals:  T(C): 35.6 (01-04-21 @ 07:11), Max: 35.6 (01-04-21 @ 06:07)  HR: 71 (01-04-21 @ 07:11) (71 - 71)  BP: 156/70 (01-04-21 @ 07:11) (156/70 - 156/70)  RR: 18 (01-04-21 @ 07:11) (18 - 18)  SpO2: 96% (01-04-21 @ 07:11) (96% - 96%)    Mental Status:  _x___ Awake   __x___ Alert   _____ Drowsy   _____ Sedated    Nausea/Vomiting:  ____ NO  __x____Yes,   See Post - Op Orders          Pain Scale (0-10):  _____    Treatment: ____ None  x  ____ See Post - Op/PCA Orders    Post - Operative Fluids:   ____ Oral   ___x_ See Post - Op Orders    Plan: Discharge:   __x__Home       _____Floor     _____Critical Care    _____  Other:_________________    Comments: uneventful anesthesia course no complications. VItals stable. Pt transferred to PACU

## 2021-01-04 NOTE — PHYSICAL THERAPY INITIAL EVALUATION ADULT - GAIT DEVIATIONS NOTED, PT EVAL
stooped posture, decreased heel strike / push off, decreased (R) knee ROM/decreased roseline/increased time in double stance/decreased step length/decreased weight-shifting ability

## 2021-01-04 NOTE — PHYSICAL THERAPY INITIAL EVALUATION ADULT - IMPAIRMENTS CONTRIBUTING TO GAIT DEVIATIONS, PT EVAL
decreased endurance/impaired balance/decreased flexibility/impaired postural control/decreased ROM/decreased strength

## 2021-01-04 NOTE — OCCUPATIONAL THERAPY INITIAL EVALUATION ADULT - LIVES WITH, PROFILE
Pt lives in pvt home with spouse. Pt with 7 steps to enter front and 4 steps to enter rear. Pt bedroom and shower stall on main level of home

## 2021-01-04 NOTE — PROGRESS NOTE ADULT - SUBJECTIVE AND OBJECTIVE BOX
65 F s/p right tka pod 0    pt s/e at bedside earlier today, pain is controlled, no CP, SOB, now c/o nausea, no abd pain    NAD  Vital Signs Last 24 Hrs  T(C): 35.4 (04 Jan 2021 18:00), Max: 36.4 (04 Jan 2021 10:10)  T(F): 95.8 (04 Jan 2021 18:00), Max: 97.5 (04 Jan 2021 10:10)  HR: 70 (04 Jan 2021 18:00) (66 - 78)  BP: 135/70 (04 Jan 2021 18:00) (113/55 - 167/80)  BP(mean): --  RR: 16 (04 Jan 2021 18:00) (16 - 18)  SpO2: 98% (04 Jan 2021 13:00) (96% - 99%)      PE:  dressing d/c/i  compartments soft  NVID  foot wwp      PT/OT, WBAT  pain control  dvt ppx - asa 81 mg bid 1 month  GI ppx  am labs  postop abx  home meds   IS  zofran for nausea  D/C planning

## 2021-01-04 NOTE — PHYSICAL THERAPY INITIAL EVALUATION ADULT - ADDITIONAL COMMENTS
As per patient, resides with spouse in private home.  7 steps to enter with (R) side rail.  Patient's bedroom and bathroom on first level.  Independent with all mobility prior to this surgery. Will need rolling walker for D/C.

## 2021-01-05 ENCOUNTER — TRANSCRIPTION ENCOUNTER (OUTPATIENT)
Age: 66
End: 2021-01-05

## 2021-01-05 VITALS
TEMPERATURE: 98 F | SYSTOLIC BLOOD PRESSURE: 111 MMHG | DIASTOLIC BLOOD PRESSURE: 63 MMHG | RESPIRATION RATE: 16 BRPM | HEART RATE: 78 BPM

## 2021-01-05 DIAGNOSIS — Z02.9 ENCOUNTER FOR ADMINISTRATIVE EXAMINATIONS, UNSPECIFIED: ICD-10-CM

## 2021-01-05 LAB
ANION GAP SERPL CALC-SCNC: 8 MMOL/L — SIGNIFICANT CHANGE UP (ref 7–14)
BUN SERPL-MCNC: 19 MG/DL — SIGNIFICANT CHANGE UP (ref 10–20)
CALCIUM SERPL-MCNC: 8.5 MG/DL — SIGNIFICANT CHANGE UP (ref 8.5–10.1)
CHLORIDE SERPL-SCNC: 99 MMOL/L — SIGNIFICANT CHANGE UP (ref 98–110)
CO2 SERPL-SCNC: 27 MMOL/L — SIGNIFICANT CHANGE UP (ref 17–32)
CREAT SERPL-MCNC: 0.7 MG/DL — SIGNIFICANT CHANGE UP (ref 0.7–1.5)
GLUCOSE SERPL-MCNC: 124 MG/DL — HIGH (ref 70–99)
HCT VFR BLD CALC: 30.4 % — LOW (ref 37–47)
HGB BLD-MCNC: 10.2 G/DL — LOW (ref 12–16)
MCHC RBC-ENTMCNC: 30.4 PG — SIGNIFICANT CHANGE UP (ref 27–31)
MCHC RBC-ENTMCNC: 33.6 G/DL — SIGNIFICANT CHANGE UP (ref 32–37)
MCV RBC AUTO: 90.5 FL — SIGNIFICANT CHANGE UP (ref 81–99)
NRBC # BLD: 0 /100 WBCS — SIGNIFICANT CHANGE UP (ref 0–0)
PLATELET # BLD AUTO: 135 K/UL — SIGNIFICANT CHANGE UP (ref 130–400)
POTASSIUM SERPL-MCNC: 4.3 MMOL/L — SIGNIFICANT CHANGE UP (ref 3.5–5)
POTASSIUM SERPL-SCNC: 4.3 MMOL/L — SIGNIFICANT CHANGE UP (ref 3.5–5)
RBC # BLD: 3.36 M/UL — LOW (ref 4.2–5.4)
RBC # FLD: 12.2 % — SIGNIFICANT CHANGE UP (ref 11.5–14.5)
SODIUM SERPL-SCNC: 134 MMOL/L — LOW (ref 135–146)
WBC # BLD: 10.5 K/UL — SIGNIFICANT CHANGE UP (ref 4.8–10.8)
WBC # FLD AUTO: 10.5 K/UL — SIGNIFICANT CHANGE UP (ref 4.8–10.8)

## 2021-01-05 PROCEDURE — 99232 SBSQ HOSP IP/OBS MODERATE 35: CPT

## 2021-01-05 RX ORDER — ACETAMINOPHEN 500 MG
2 TABLET ORAL
Qty: 0 | Refills: 0 | DISCHARGE
Start: 2021-01-05

## 2021-01-05 RX ORDER — SENNA PLUS 8.6 MG/1
2 TABLET ORAL
Qty: 0 | Refills: 0 | DISCHARGE
Start: 2021-01-05

## 2021-01-05 RX ORDER — CELECOXIB 200 MG/1
1 CAPSULE ORAL
Qty: 28 | Refills: 0
Start: 2021-01-05 | End: 2021-01-18

## 2021-01-05 RX ORDER — ASPIRIN/CALCIUM CARB/MAGNESIUM 324 MG
1 TABLET ORAL
Qty: 84 | Refills: 0
Start: 2021-01-05 | End: 2021-02-15

## 2021-01-05 RX ORDER — TRAMADOL HYDROCHLORIDE 50 MG/1
1 TABLET ORAL
Qty: 42 | Refills: 0
Start: 2021-01-05 | End: 2021-01-11

## 2021-01-05 RX ORDER — ASPIRIN/CALCIUM CARB/MAGNESIUM 324 MG
1 TABLET ORAL
Qty: 0 | Refills: 0 | DISCHARGE

## 2021-01-05 RX ADMIN — CELECOXIB 200 MILLIGRAM(S): 200 CAPSULE ORAL at 11:23

## 2021-01-05 RX ADMIN — Medication 15 MILLIGRAM(S): at 06:01

## 2021-01-05 RX ADMIN — Medication 15 MILLIGRAM(S): at 06:31

## 2021-01-05 RX ADMIN — Medication 81 MILLIGRAM(S): at 06:00

## 2021-01-05 RX ADMIN — ESCITALOPRAM OXALATE 10 MILLIGRAM(S): 10 TABLET, FILM COATED ORAL at 11:20

## 2021-01-05 RX ADMIN — CELECOXIB 200 MILLIGRAM(S): 200 CAPSULE ORAL at 11:20

## 2021-01-05 RX ADMIN — Medication 5 MILLIGRAM(S): at 06:01

## 2021-01-05 RX ADMIN — Medication 650 MILLIGRAM(S): at 06:30

## 2021-01-05 RX ADMIN — Medication 650 MILLIGRAM(S): at 06:00

## 2021-01-05 RX ADMIN — Medication 650 MILLIGRAM(S): at 11:23

## 2021-01-05 RX ADMIN — PANTOPRAZOLE SODIUM 40 MILLIGRAM(S): 20 TABLET, DELAYED RELEASE ORAL at 06:01

## 2021-01-05 RX ADMIN — Medication 650 MILLIGRAM(S): at 11:20

## 2021-01-05 RX ADMIN — TRAMADOL HYDROCHLORIDE 50 MILLIGRAM(S): 50 TABLET ORAL at 11:23

## 2021-01-05 RX ADMIN — TRAMADOL HYDROCHLORIDE 50 MILLIGRAM(S): 50 TABLET ORAL at 11:20

## 2021-01-05 NOTE — DISCHARGE NOTE NURSING/CASE MANAGEMENT/SOCIAL WORK - PATIENT PORTAL LINK FT
You can access the FollowMyHealth Patient Portal offered by [facility] by registering at the following website: [email or web address]. By joining [employer]’s FollowMyHealth portal, you will also be able to view your health information using other applications (apps) compatible with our system.

## 2021-01-05 NOTE — PROGRESS NOTE ADULT - SUBJECTIVE AND OBJECTIVE BOX
CC.  S/P right Total knee replacement   HPI.  Patient reports right knee pain controlled. Offers no other complaints    Constitutional: No fever, fatigue or weight loss.  Skin: No rash.  Eyes: No recent vision problems or eye pain.  ENT: No congestion, ear pain, or sore throat.  Endocrine: No thyroid problems.  Cardiovascular: No chest pain or palpation.  Respiratory: No cough, shortness of breath, congestion, or wheezing.  Gastrointestinal: No abdominal pain, nausea, vomiting, or diarrhea.  Genitourinary: No dysuria.  Musculoskeletal: No joint swelling.  Neurologic: No headache.     Vital Signs Last 24 Hrs  T(C): 35.9 (05 Jan 2021 06:05), Max: 36.6 (05 Jan 2021 02:02)  T(F): 96.6 (05 Jan 2021 06:05), Max: 97.8 (05 Jan 2021 02:02)  HR: 75 (05 Jan 2021 06:05) (59 - 78)  BP: 133/63 (05 Jan 2021 06:05) (112/57 - 167/80)  BP(mean): --  RR: 16 (05 Jan 2021 06:05) (16 - 16)  SpO2: 98% (04 Jan 2021 13:00) (96% - 99%)   PE  GENERAL: NAD, well-groomed, well-developed  HEAD:  Atraumatic, Normocephalic  EYES: EOMI, PERRLA, conjunctiva and sclera clear  NECK: Supple, No JVD, Normal thyroid  NERVOUS SYSTEM:  Alert & Oriented X3, Motor Strength 5/5 B/L upper and lower extremities; DTRs 2+ intact and symmetric  CHEST/LUNG: Clear to percussion bilaterally; No rales, rhonchi, wheezing, or rubs  HEART: Regular rate and rhythm; No murmurs, rubs, or gallops  ABDOMEN: Soft, Nontender, Nondistended; Bowel sounds present  EXTREMITIES:  2+ Peripheral Pulses, No clubbing, cyanosis, or edema  SKIN: No rashes or lesions                            10.2   10.50 )-----------( 135      ( 05 Jan 2021 06:53 )             30.4     01-05    134<L>  |  99  |  19  ----------------------------<  124<H>  4.3   |  27  |  0.7    Ca    8.5      05 Jan 2021 06:53                Imaging Personally Reviewed:     [x ] YES  [ ] NO    Consultant(s) Notes Reviewed:  [x ] YES  [ ] NO    Care Discussed with Consultants/Other Providers [x ] YES  [ ] NO

## 2021-01-05 NOTE — PROGRESS NOTE ADULT - SUBJECTIVE AND OBJECTIVE BOX
ORTHO PROGRESS NOTE       65y Female POD # 1     S/P right Total Knee Arthoplasty     Patient seen and examined at bedside . The patient is awake and alert in NAD. No complaints of chest pain, SOB, N/V.    PAST MEDICAL & SURGICAL HISTORY:  PRISCA on CPAP    Depression    GERD (gastroesophageal reflux disease)    Hypertension    S/P ectopic pregnancy    S/P appendectomy    S/P cholecystectomy          MEDICATIONS  (STANDING):  acetaminophen   Tablet .. 650 milliGRAM(s) Oral every 6 hours  aspirin enteric coated 81 milliGRAM(s) Oral two times a day  celecoxib 200 milliGRAM(s) Oral every 12 hours  chlorhexidine 4% Liquid 1 Application(s) Topical <User Schedule>  enalapril 5 milliGRAM(s) Oral daily  escitalopram 10 milliGRAM(s) Oral daily  pantoprazole    Tablet 40 milliGRAM(s) Oral before breakfast  senna 2 Tablet(s) Oral at bedtime  vancomycin  IVPB 1250 milliGRAM(s) IV Intermittent once    MEDICATIONS  (PRN):  magnesium hydroxide Suspension 30 milliLiter(s) Oral daily PRN Constipation  ondansetron Injectable 4 milliGRAM(s) IV Push every 6 hours PRN Nausea and/or Vomiting  traMADol 50 milliGRAM(s) Oral every 4 hours PRN Severe Pain (7 - 10)        Vital Signs Last 24 Hrs  T(C): 35.9 (05 Jan 2021 06:05), Max: 36.6 (05 Jan 2021 02:02)  T(F): 96.6 (05 Jan 2021 06:05), Max: 97.8 (05 Jan 2021 02:02)  HR: 75 (05 Jan 2021 06:05) (59 - 78)  BP: 133/63 (05 Jan 2021 06:05) (112/57 - 167/80)  BP(mean): --  RR: 16 (05 Jan 2021 06:05) (16 - 16)  SpO2: 98% (04 Jan 2021 13:00) (96% - 99%)                          10.2   10.50 )-----------( 135      ( 05 Jan 2021 06:53 )             30.4     01-05    134<L>  |  99  |  19  ----------------------------<  124<H>  4.3   |  27  |  0.7    Ca    8.5      05 Jan 2021 06:53            DVT ppx : aspirin         PE:  The patient was seen and examined at bedside          A&OX3, NAD          dressing C/D/I          Compartments soft         NVI, SILT           A/P:     1.     POD # 1      s/p  right Total Knee Arthoplasty                      OOB to Chair            Physical Therapy- wbat            Pain control - per pain protocol            Incentive Spirometry            DVT Prophylaxis - aspirin bid            f/u am labs            2.      GERD- continue Protonix, resume home med on discharge    3.      HTN- monitor, stable continue home meds    4.      Depression, stable , continue home meds                 Dispo: home with home care

## 2021-01-05 NOTE — DISCHARGE NOTE PROVIDER - HOSPITAL COURSE
65 year old female with a past medical history of htn, gerd and depression , admitted for an elective Total Knee Arthoplasty . Routine post operative course

## 2021-01-05 NOTE — DISCHARGE NOTE PROVIDER - NSDCMRMEDTOKEN_GEN_ALL_CORE_FT
acetaminophen 325 mg oral tablet: 2 tab(s) orally every 6 hours x 14 days  aspirin 81 mg oral delayed release tablet: 1 tab(s) orally 2 times a day  celecoxib 200 mg oral capsule: 1 cap(s) orally every 12 hours  Dexilant 60 mg oral delayed release capsule: 1 cap(s) orally once a day  enalapril-hydrochlorothiazide 5 mg-12.5 mg oral tablet: 1 tab(s) orally once a day  escitalopram 10 mg oral tablet: 1 tab(s) orally once a day  senna oral tablet: 2 tab(s) orally once a day (at bedtime)  traMADol 50 mg oral tablet: 1 tab(s) orally every 4 hours, As needed, Severe Pain (7 - 10) MDD:6 tablets

## 2021-01-05 NOTE — DISCHARGE NOTE PROVIDER - NSDCFUADDINST_GEN_ALL_CORE_FT
Message from Valentinehart:  Original authorizing provider: MATIAS Kellogg CNP would like a refill of the following medications:  amphetamine-dextroamphetamine (ADDERALL) 10 MG per tablet [MATIAS Kellogg CNP]  amphetamine-dextroamphetamine (ADDERALL XR) 30 MG per 24 hr capsule [MATIAS Kellogg CNP]    Preferred pharmacy: [facility] - [address] [facility]     Comment:     Keep surgical site clean and dry, may remove dressing in 7  days . Call Dr. Ahuja  if any wound drainage, redness , increasing pain, fevers over 101 or if you have any questions or concerns.     You may shower with the bandage on and once it is removed. Once it is removed  , do not scrub surgical site. Do not apply any lotions/moisturizers/creams to surgical site.    Call to make your  post op appointment if you do not have one already.     After completing 6 weeks of aspirin 81 mg twice daily , you may resume your usual once daily dosage.

## 2021-01-05 NOTE — PROGRESS NOTE ADULT - ASSESSMENT
Patient is 66 yo female with hx of Depression, GERD (gastroesophageal reflux disease), and HTN presenting with       1.  Right Total knee replacement  - Continue with pain management, DVT proph, and wound care as per Ortho.  PT/OT    2. HTN/GERD/Depression  -Continue with home medications    Plan of care was discussed with patient, in great details, All questions were answered to their satisfication.  Seems to understand, and in agreement

## 2021-01-05 NOTE — DISCHARGE NOTE PROVIDER - CARE PROVIDER_API CALL
Max Zee  ORTHOPAEDIC SURGERY  [address] [place] [address]  Phone: [phone]  Fax: [phone]  Follow Up Time:

## 2021-01-11 DIAGNOSIS — I10 ESSENTIAL (PRIMARY) HYPERTENSION: ICD-10-CM

## 2021-01-11 DIAGNOSIS — K21.9 GASTRO-ESOPHAGEAL REFLUX DISEASE WITHOUT ESOPHAGITIS: ICD-10-CM

## 2021-01-11 DIAGNOSIS — M17.11 UNILATERAL PRIMARY OSTEOARTHRITIS, RIGHT KNEE: ICD-10-CM

## 2021-01-11 DIAGNOSIS — Z88.0 ALLERGY STATUS TO PENICILLIN: ICD-10-CM

## 2021-01-11 DIAGNOSIS — Z90.49 ACQUIRED ABSENCE OF OTHER SPECIFIED PARTS OF DIGESTIVE TRACT: ICD-10-CM

## 2021-01-11 DIAGNOSIS — Z79.82 LONG TERM (CURRENT) USE OF ASPIRIN: ICD-10-CM

## 2021-01-11 DIAGNOSIS — G47.33 OBSTRUCTIVE SLEEP APNEA (ADULT) (PEDIATRIC): ICD-10-CM

## 2021-01-11 DIAGNOSIS — E66.9 OBESITY, UNSPECIFIED: ICD-10-CM

## 2021-01-27 ENCOUNTER — EMERGENCY (EMERGENCY)
Facility: HOSPITAL | Age: 66
LOS: 0 days | Discharge: HOME | End: 2021-01-28
Attending: EMERGENCY MEDICINE | Admitting: EMERGENCY MEDICINE
Payer: MEDICARE

## 2021-01-27 VITALS
SYSTOLIC BLOOD PRESSURE: 154 MMHG | DIASTOLIC BLOOD PRESSURE: 68 MMHG | HEART RATE: 156 BPM | OXYGEN SATURATION: 100 % | WEIGHT: 179.9 LBS | RESPIRATION RATE: 24 BRPM | TEMPERATURE: 98 F | HEIGHT: 63 IN

## 2021-01-27 DIAGNOSIS — I10 ESSENTIAL (PRIMARY) HYPERTENSION: ICD-10-CM

## 2021-01-27 DIAGNOSIS — Z90.49 ACQUIRED ABSENCE OF OTHER SPECIFIED PARTS OF DIGESTIVE TRACT: Chronic | ICD-10-CM

## 2021-01-27 DIAGNOSIS — Z98.84 BARIATRIC SURGERY STATUS: ICD-10-CM

## 2021-01-27 DIAGNOSIS — Z98.890 OTHER SPECIFIED POSTPROCEDURAL STATES: ICD-10-CM

## 2021-01-27 DIAGNOSIS — R60.0 LOCALIZED EDEMA: ICD-10-CM

## 2021-01-27 DIAGNOSIS — R07.9 CHEST PAIN, UNSPECIFIED: ICD-10-CM

## 2021-01-27 DIAGNOSIS — Z79.899 OTHER LONG TERM (CURRENT) DRUG THERAPY: ICD-10-CM

## 2021-01-27 DIAGNOSIS — Z88.0 ALLERGY STATUS TO PENICILLIN: ICD-10-CM

## 2021-01-27 DIAGNOSIS — R07.89 OTHER CHEST PAIN: ICD-10-CM

## 2021-01-27 DIAGNOSIS — R00.0 TACHYCARDIA, UNSPECIFIED: ICD-10-CM

## 2021-01-27 DIAGNOSIS — Z87.59 PERSONAL HISTORY OF OTHER COMPLICATIONS OF PREGNANCY, CHILDBIRTH AND THE PUERPERIUM: Chronic | ICD-10-CM

## 2021-01-27 LAB
ALBUMIN SERPL ELPH-MCNC: 4.2 G/DL — SIGNIFICANT CHANGE UP (ref 3.5–5.2)
ALP SERPL-CCNC: 86 U/L — SIGNIFICANT CHANGE UP (ref 30–115)
ALT FLD-CCNC: 16 U/L — SIGNIFICANT CHANGE UP (ref 0–41)
ANION GAP SERPL CALC-SCNC: 13 MMOL/L — SIGNIFICANT CHANGE UP (ref 7–14)
APPEARANCE UR: CLEAR — SIGNIFICANT CHANGE UP
APTT BLD: 38.2 SEC — SIGNIFICANT CHANGE UP (ref 27–39.2)
AST SERPL-CCNC: 20 U/L — SIGNIFICANT CHANGE UP (ref 0–41)
BACTERIA # UR AUTO: NEGATIVE — SIGNIFICANT CHANGE UP
BASOPHILS # BLD AUTO: 0.06 K/UL — SIGNIFICANT CHANGE UP (ref 0–0.2)
BASOPHILS NFR BLD AUTO: 0.8 % — SIGNIFICANT CHANGE UP (ref 0–1)
BILIRUB SERPL-MCNC: 0.3 MG/DL — SIGNIFICANT CHANGE UP (ref 0.2–1.2)
BILIRUB UR-MCNC: NEGATIVE — SIGNIFICANT CHANGE UP
BUN SERPL-MCNC: 21 MG/DL — HIGH (ref 10–20)
CALCIUM SERPL-MCNC: 9.7 MG/DL — SIGNIFICANT CHANGE UP (ref 8.5–10.1)
CHLORIDE SERPL-SCNC: 97 MMOL/L — LOW (ref 98–110)
CO2 SERPL-SCNC: 27 MMOL/L — SIGNIFICANT CHANGE UP (ref 17–32)
COLOR SPEC: COLORLESS — SIGNIFICANT CHANGE UP
CREAT SERPL-MCNC: 1 MG/DL — SIGNIFICANT CHANGE UP (ref 0.7–1.5)
DIFF PNL FLD: NEGATIVE — SIGNIFICANT CHANGE UP
EOSINOPHIL # BLD AUTO: 0.08 K/UL — SIGNIFICANT CHANGE UP (ref 0–0.7)
EOSINOPHIL NFR BLD AUTO: 1.1 % — SIGNIFICANT CHANGE UP (ref 0–8)
EPI CELLS # UR: 2 /HPF — SIGNIFICANT CHANGE UP (ref 0–5)
GLUCOSE SERPL-MCNC: 115 MG/DL — HIGH (ref 70–99)
GLUCOSE UR QL: NEGATIVE — SIGNIFICANT CHANGE UP
HCT VFR BLD CALC: 37.4 % — SIGNIFICANT CHANGE UP (ref 37–47)
HGB BLD-MCNC: 12.4 G/DL — SIGNIFICANT CHANGE UP (ref 12–16)
HYALINE CASTS # UR AUTO: 1 /LPF — SIGNIFICANT CHANGE UP (ref 0–7)
IMM GRANULOCYTES NFR BLD AUTO: 0.1 % — SIGNIFICANT CHANGE UP (ref 0.1–0.3)
INR BLD: 0.98 RATIO — SIGNIFICANT CHANGE UP (ref 0.65–1.3)
KETONES UR-MCNC: NEGATIVE — SIGNIFICANT CHANGE UP
LEUKOCYTE ESTERASE UR-ACNC: ABNORMAL
LYMPHOCYTES # BLD AUTO: 1.66 K/UL — SIGNIFICANT CHANGE UP (ref 1.2–3.4)
LYMPHOCYTES # BLD AUTO: 23.2 % — SIGNIFICANT CHANGE UP (ref 20.5–51.1)
MCHC RBC-ENTMCNC: 29.9 PG — SIGNIFICANT CHANGE UP (ref 27–31)
MCHC RBC-ENTMCNC: 33.2 G/DL — SIGNIFICANT CHANGE UP (ref 32–37)
MCV RBC AUTO: 90.1 FL — SIGNIFICANT CHANGE UP (ref 81–99)
MONOCYTES # BLD AUTO: 0.46 K/UL — SIGNIFICANT CHANGE UP (ref 0.1–0.6)
MONOCYTES NFR BLD AUTO: 6.4 % — SIGNIFICANT CHANGE UP (ref 1.7–9.3)
NEUTROPHILS # BLD AUTO: 4.87 K/UL — SIGNIFICANT CHANGE UP (ref 1.4–6.5)
NEUTROPHILS NFR BLD AUTO: 68.4 % — SIGNIFICANT CHANGE UP (ref 42.2–75.2)
NITRITE UR-MCNC: NEGATIVE — SIGNIFICANT CHANGE UP
NRBC # BLD: 0 /100 WBCS — SIGNIFICANT CHANGE UP (ref 0–0)
NT-PROBNP SERPL-SCNC: 32 PG/ML — SIGNIFICANT CHANGE UP (ref 0–300)
PH UR: 6.5 — SIGNIFICANT CHANGE UP (ref 5–8)
PLATELET # BLD AUTO: 352 K/UL — SIGNIFICANT CHANGE UP (ref 130–400)
POTASSIUM SERPL-MCNC: 3.8 MMOL/L — SIGNIFICANT CHANGE UP (ref 3.5–5)
POTASSIUM SERPL-SCNC: 3.8 MMOL/L — SIGNIFICANT CHANGE UP (ref 3.5–5)
PROT SERPL-MCNC: 7.4 G/DL — SIGNIFICANT CHANGE UP (ref 6–8)
PROT UR-MCNC: NEGATIVE — SIGNIFICANT CHANGE UP
PROTHROM AB SERPL-ACNC: 11.3 SEC — SIGNIFICANT CHANGE UP (ref 9.95–12.87)
RBC # BLD: 4.15 M/UL — LOW (ref 4.2–5.4)
RBC # FLD: 12.8 % — SIGNIFICANT CHANGE UP (ref 11.5–14.5)
RBC CASTS # UR COMP ASSIST: 1 /HPF — SIGNIFICANT CHANGE UP (ref 0–4)
SODIUM SERPL-SCNC: 137 MMOL/L — SIGNIFICANT CHANGE UP (ref 135–146)
SP GR SPEC: 1.01 — LOW (ref 1.01–1.03)
TROPONIN T SERPL-MCNC: <0.01 NG/ML — SIGNIFICANT CHANGE UP
UROBILINOGEN FLD QL: SIGNIFICANT CHANGE UP
WBC # BLD: 7.14 K/UL — SIGNIFICANT CHANGE UP (ref 4.8–10.8)
WBC # FLD AUTO: 7.14 K/UL — SIGNIFICANT CHANGE UP (ref 4.8–10.8)
WBC UR QL: 9 /HPF — HIGH (ref 0–5)

## 2021-01-27 PROCEDURE — 71045 X-RAY EXAM CHEST 1 VIEW: CPT | Mod: 26

## 2021-01-27 PROCEDURE — 99285 EMERGENCY DEPT VISIT HI MDM: CPT

## 2021-01-27 PROCEDURE — 93010 ELECTROCARDIOGRAM REPORT: CPT

## 2021-01-27 NOTE — ED PROVIDER NOTE - PATIENT PORTAL LINK FT
You can access the FollowMyHealth Patient Portal offered by [facility] by registering at the following website: [email or web address]. By joining [email or web address]’s FollowMyHealth portal, you will also be able to view your health information using other applications (apps) compatible with our system.

## 2021-01-27 NOTE — ED PROVIDER NOTE - CARE PROVIDER_API CALL
Storm Baca (MD)  Cardiovascular Disease; Internal Medicine; Interventional Cardiology  [address] [place] [address]  Phone: [phone]  Fax: [phone]  Follow Up Time:

## 2021-01-27 NOTE — ED PROVIDER NOTE - NS ED ROS FT
Constitutional: (-) fever  Eyes/ENT: (-) blurry vision, (-) epistaxis  Cardiovascular: (+) chest pain, (+) palpitations, (-) syncope  Respiratory: (+) shortness of breath, (-) cough  Gastrointestinal: (-) vomiting, (-) diarrhea  : (-) dysuria, (-) hematuria  Musculoskeletal: (-) neck pain, (-) back pain, (-) joint pain  Integumentary: (-) rash, (-) edema  Neurological: (-) headache, (-) altered mental status  Allergic/Immunologic: (-) pruritus

## 2021-01-27 NOTE — ED PROVIDER NOTE - PHYSICAL EXAMINATION
CONST: NAD  EYES: Sclera and conjunctiva clear.   ENT: No nasal discharge. Oropharynx normal appearing   NECK: Non-tender, no meningeal signs. normal ROM. supple   CARD: Tachycardiac S1 S2; No jvd  RESP: Equal BS B/L, No wheezes, rhonchi or rales. No distress  GI: Soft, non-tender, non-distended. no cva tenderness. normal BS  MS: Mild edema to RLE. Normal ROM in all extremities. pulses 2 +.   SKIN: Warm, dry, no acute rashes. Good turgor  NEURO: A&Ox3, No focal deficits.

## 2021-01-27 NOTE — ED PROVIDER NOTE - OBJECTIVE STATEMENT
65y F pmh R TKA 1/4 presents for eval of chest pain. Pt developed sudden moderate sharp chest pain this afternoon with associated sob and palpitations, no aggravating or relieving factors. Denies fever, ha, cough, weakness, numbness, abd pain, dysuria, hematuria, n/v/d/c

## 2021-01-27 NOTE — ED PROVIDER NOTE - PSH
Admission medication history for the August 6, 2018 admission is complete.     Interview sources:  Patient    Reliability of source: Good    Medication compliance: Good    Changes made to PTA medication list (reason)  Added: None  Deleted: None  Changed: None    Additional medication history information:   Patient reported not currently taking any prescriptions or medications over the counter.    Prior to Admission medications    Not on File       Time spent: 15 minutes    Medication history completed by:   Xochitl Bill, Pharmacy Intern    
S/P appendectomy    S/P cholecystectomy    S/P ectopic pregnancy

## 2021-01-27 NOTE — ED PROVIDER NOTE - IV ALTEPASE ADMIN HIDDEN
Pt now has appointment with Lesley Odom RN with educational services on 6/12/19 at 1245. No further action needed by Nephrology department.    show

## 2021-01-27 NOTE — ED PROVIDER NOTE - CLINICAL SUMMARY MEDICAL DECISION MAKING FREE TEXT BOX
66 yo F, hx of R TKR on 1/14 by Dr. Herminia Mcintosh, depression/anxiety, GERD, HTN here for assessment of sudden onset chest pain associated with dyspnea, palpitations. Sx began while sitting on the couch, pain began in back, then spread to entire chest, reported to be sharp. Palpitations and dyspnea followed. Sx resolved rapidly with mild palpitations only on arrival.     VS notable for  however EKG done immediately after with , likely was not at 156, BP slightly elevated, patient very anxious, mildly tachypneic, has clear lungs, regular rhythm, soft, NT, ND abdomen, healing L TKR incision, mild calf ttp which she states has been ongoing since surgery, no calf fullness, asymmetry.     EKG sinus tachy, no ischemia.    CP not typical of cardiac ischemia however given age, trop x 2 done -- both negative.   Sx were most concerning for PE and given patient is high risk by well's score, CTA done -- no PE or other intrathoracic pathology noted.     Patient observed for a total of 6 hours given need for repeat trop -- sx resolved completely, HR on dc 80.     Patient has minimal cardiac risk factors, had atypical pain and negative trop and delta trop -- discussed need for follow up with cardiology. Will return for any new or worsening symptoms, recurrence of sx.

## 2021-01-28 VITALS — HEART RATE: 83 BPM

## 2021-01-28 PROBLEM — G47.33 OBSTRUCTIVE SLEEP APNEA (ADULT) (PEDIATRIC): Chronic | Status: ACTIVE | Noted: 2021-01-04

## 2021-01-28 LAB — TROPONIN T SERPL-MCNC: <0.01 NG/ML — SIGNIFICANT CHANGE UP

## 2021-01-28 PROCEDURE — 71275 CT ANGIOGRAPHY CHEST: CPT | Mod: 26

## 2021-01-28 RX ORDER — MORPHINE SULFATE 50 MG/1
2 CAPSULE, EXTENDED RELEASE ORAL ONCE
Refills: 0 | Status: DISCONTINUED | OUTPATIENT
Start: 2021-01-28 | End: 2021-01-28

## 2021-01-28 RX ORDER — TRAMADOL HYDROCHLORIDE 50 MG/1
25 TABLET ORAL ONCE
Refills: 0 | Status: DISCONTINUED | OUTPATIENT
Start: 2021-01-28 | End: 2021-01-28

## 2021-01-28 RX ADMIN — TRAMADOL HYDROCHLORIDE 25 MILLIGRAM(S): 50 TABLET ORAL at 03:56

## 2021-01-28 RX ADMIN — MORPHINE SULFATE 2 MILLIGRAM(S): 50 CAPSULE, EXTENDED RELEASE ORAL at 02:58

## 2021-01-28 NOTE — ED ADULT NURSE NOTE - COVID-19 ORDERING FACILITY
[facility] HOSPITALIST  DISCHARGE SUMMARY     Delphinenghia Torres Patient Status:  Inpatient    1952 MRN [id number]   [facility] 5NW-A Attending No att. providers found   [facility] Day # 15 PCP Shan De La Fuente MD     Date of Admission:  [facility]  - [facility]

## 2021-01-28 NOTE — ED ADULT NURSE NOTE - NSIMPLEMENTINTERV_GEN_ALL_ED
Implemented All Fall Risk Interventions:  [place] to call system. Call bell, personal items and telephone within reach. Instruct patient to call for assistance. Room bathroom lighting operational. Non-slip footwear when patient is off stretcher. Physically safe environment: no spills, clutter or unnecessary equipment. Stretcher in lowest position, wheels locked, appropriate side rails in place. Provide visual cue, wrist band, yellow gown, etc. Monitor gait and stability. Monitor for mental status changes and reorient to person, place, and time. Review medications for side effects contributing to fall risk. Reinforce activity limits and safety measures with patient and family.

## 2021-04-28 NOTE — H&P PST ADULT - GUM GEN PE MLT EXAM PC
Medication(s) Requested: hydrocdone,adderall  Last office visit: 3/6/21  Last refill: 4/5/21    Is the patient due for refill of this medication(s): Yes on May 5  PDMP review: Criteria met. Forwarded to Physician/OSCAR for signature.        not examined

## 2021-07-27 NOTE — H&P PST ADULT - NS ABD PE RECTAL EXAM
Problem: Diabetes  Goal: Verbalizes/demonstrates understanding of Diabetes  Description: Document on Patient Education Activity  Outcome: Outcome Met, Continue evaluating goal progress toward completion     Problem: Diabetes  Goal: Glycemic balance achieved/maintained  Description: Goal is to maintain blood sugar within range with no episodes of hypoglycemia  Outcome: Outcome Not Met, Continue to Monitor    Patient blood glucose levels improving  
not examined

## 2021-12-23 NOTE — PRE-ANESTHESIA EVALUATION ADULT - MALLAMPATI CLASS
Not applicable: This is a surgical and/or non-medical patient
Class II - visualization of the soft palate, fauces, and uvula

## 2022-01-17 NOTE — PRE-ANESTHESIA EVALUATION ADULT - WEIGHT IN KG
1/19/2022    Patient: Doretha Ley   YOB: 2007   Date of Visit: 1/17/2022     Lucy Crane NP  [facility] [address]  Via Fax: [phone]    Dear Lucy Crane NP,      Thank you for referring Ms. Lilibeth Martinez to [facility] for evaluation. My notes for this consultation are attached. If you have questions, please do not hesitate to call me. I look forward to following your patient along with you.       Sincerely,    Denisa Rob PsyD 83.9

## 2022-08-22 NOTE — H&P PST ADULT - EYES
----- Message from Flor Keith LPN sent at 8/15/2022  9:22 AM EDT -----      ----- Message -----  From: SYSTEM  Sent: 8/15/2022   1:46 AM EDT  To: [facility] [place]    
8/15/22 hub to read - called pt re overdue labs. -1st attempt   
8/22/22 hub to read - trc re overdue labs. -2nd attempt & letter sent   
EOMI; PERRL; no drainage or redness

## 2022-10-21 ENCOUNTER — INPATIENT (INPATIENT)
Facility: HOSPITAL | Age: 67
LOS: 2 days | Discharge: HOME | End: 2022-10-24
Attending: INTERNAL MEDICINE | Admitting: INTERNAL MEDICINE
Payer: MEDICARE

## 2022-10-21 VITALS
TEMPERATURE: 98 F | OXYGEN SATURATION: 96 % | HEIGHT: 63 IN | HEART RATE: 135 BPM | RESPIRATION RATE: 18 BRPM | SYSTOLIC BLOOD PRESSURE: 180 MMHG | DIASTOLIC BLOOD PRESSURE: 96 MMHG

## 2022-10-21 DIAGNOSIS — Z90.49 ACQUIRED ABSENCE OF OTHER SPECIFIED PARTS OF DIGESTIVE TRACT: Chronic | ICD-10-CM

## 2022-10-21 DIAGNOSIS — Z87.59 PERSONAL HISTORY OF OTHER COMPLICATIONS OF PREGNANCY, CHILDBIRTH AND THE PUERPERIUM: Chronic | ICD-10-CM

## 2022-10-21 LAB
ALBUMIN SERPL ELPH-MCNC: 4.8 G/DL — SIGNIFICANT CHANGE UP (ref 3.5–5.2)
ALP SERPL-CCNC: 64 U/L — SIGNIFICANT CHANGE UP (ref 30–115)
ALT FLD-CCNC: 20 U/L — SIGNIFICANT CHANGE UP (ref 0–41)
ANION GAP SERPL CALC-SCNC: 11 MMOL/L — SIGNIFICANT CHANGE UP (ref 7–14)
AST SERPL-CCNC: 29 U/L — SIGNIFICANT CHANGE UP (ref 0–41)
BASE EXCESS BLDV CALC-SCNC: 4.9 MMOL/L — HIGH (ref -2–3)
BASOPHILS # BLD AUTO: 0.06 K/UL — SIGNIFICANT CHANGE UP (ref 0–0.2)
BASOPHILS NFR BLD AUTO: 1 % — SIGNIFICANT CHANGE UP (ref 0–1)
BILIRUB SERPL-MCNC: 0.4 MG/DL — SIGNIFICANT CHANGE UP (ref 0.2–1.2)
BUN SERPL-MCNC: 22 MG/DL — HIGH (ref 10–20)
CA-I SERPL-SCNC: 1.22 MMOL/L — SIGNIFICANT CHANGE UP (ref 1.15–1.33)
CALCIUM SERPL-MCNC: 9.6 MG/DL — SIGNIFICANT CHANGE UP (ref 8.4–10.5)
CHLORIDE SERPL-SCNC: 103 MMOL/L — SIGNIFICANT CHANGE UP (ref 98–110)
CO2 SERPL-SCNC: 27 MMOL/L — SIGNIFICANT CHANGE UP (ref 17–32)
CREAT SERPL-MCNC: 0.9 MG/DL — SIGNIFICANT CHANGE UP (ref 0.7–1.5)
D DIMER BLD IA.RAPID-MCNC: 212 NG/ML DDU — SIGNIFICANT CHANGE UP (ref 0–230)
EGFR: 70 ML/MIN/1.73M2 — SIGNIFICANT CHANGE UP
EOSINOPHIL # BLD AUTO: 0.11 K/UL — SIGNIFICANT CHANGE UP (ref 0–0.7)
EOSINOPHIL NFR BLD AUTO: 1.8 % — SIGNIFICANT CHANGE UP (ref 0–8)
GAS PNL BLDV: 137 MMOL/L — SIGNIFICANT CHANGE UP (ref 136–145)
GAS PNL BLDV: SIGNIFICANT CHANGE UP
GLUCOSE SERPL-MCNC: 148 MG/DL — HIGH (ref 70–99)
HCO3 BLDV-SCNC: 28 MMOL/L — SIGNIFICANT CHANGE UP (ref 22–29)
HCT VFR BLD CALC: 34.7 % — LOW (ref 37–47)
HCT VFR BLDA CALC: 44 % — SIGNIFICANT CHANGE UP (ref 39–51)
HGB BLD CALC-MCNC: 14.8 G/DL — SIGNIFICANT CHANGE UP (ref 12.6–17.4)
HGB BLD-MCNC: 11.1 G/DL — LOW (ref 12–16)
IMM GRANULOCYTES NFR BLD AUTO: 0.2 % — SIGNIFICANT CHANGE UP (ref 0.1–0.3)
LACTATE BLDV-MCNC: 2.2 MMOL/L — HIGH (ref 0.5–2)
LYMPHOCYTES # BLD AUTO: 2.56 K/UL — SIGNIFICANT CHANGE UP (ref 1.2–3.4)
LYMPHOCYTES # BLD AUTO: 42 % — SIGNIFICANT CHANGE UP (ref 20.5–51.1)
MAGNESIUM SERPL-MCNC: 2.1 MG/DL — SIGNIFICANT CHANGE UP (ref 1.8–2.4)
MCHC RBC-ENTMCNC: 25.4 PG — LOW (ref 27–31)
MCHC RBC-ENTMCNC: 32 G/DL — SIGNIFICANT CHANGE UP (ref 32–37)
MCV RBC AUTO: 79.4 FL — LOW (ref 81–99)
MONOCYTES # BLD AUTO: 0.36 K/UL — SIGNIFICANT CHANGE UP (ref 0.1–0.6)
MONOCYTES NFR BLD AUTO: 5.9 % — SIGNIFICANT CHANGE UP (ref 1.7–9.3)
NEUTROPHILS # BLD AUTO: 3 K/UL — SIGNIFICANT CHANGE UP (ref 1.4–6.5)
NEUTROPHILS NFR BLD AUTO: 49.1 % — SIGNIFICANT CHANGE UP (ref 42.2–75.2)
NRBC # BLD: 0 /100 WBCS — SIGNIFICANT CHANGE UP (ref 0–0)
NT-PROBNP SERPL-SCNC: 22 PG/ML — SIGNIFICANT CHANGE UP (ref 0–300)
PCO2 BLDV: 36 MMHG — LOW (ref 39–42)
PH BLDV: 7.5 — HIGH (ref 7.32–7.43)
PLATELET # BLD AUTO: 212 K/UL — SIGNIFICANT CHANGE UP (ref 130–400)
PO2 BLDV: 30 MMHG — SIGNIFICANT CHANGE UP
POTASSIUM BLDV-SCNC: 3.4 MMOL/L — LOW (ref 3.5–5.1)
POTASSIUM SERPL-MCNC: 3.9 MMOL/L — SIGNIFICANT CHANGE UP (ref 3.5–5)
POTASSIUM SERPL-SCNC: 3.9 MMOL/L — SIGNIFICANT CHANGE UP (ref 3.5–5)
PROT SERPL-MCNC: 7.7 G/DL — SIGNIFICANT CHANGE UP (ref 6–8)
RBC # BLD: 4.37 M/UL — SIGNIFICANT CHANGE UP (ref 4.2–5.4)
RBC # FLD: 16.4 % — HIGH (ref 11.5–14.5)
SAO2 % BLDV: 57.8 % — SIGNIFICANT CHANGE UP
SARS-COV-2 RNA SPEC QL NAA+PROBE: SIGNIFICANT CHANGE UP
SODIUM SERPL-SCNC: 141 MMOL/L — SIGNIFICANT CHANGE UP (ref 135–146)
TROPONIN T SERPL-MCNC: <0.01 NG/ML — SIGNIFICANT CHANGE UP
WBC # BLD: 6.1 K/UL — SIGNIFICANT CHANGE UP (ref 4.8–10.8)
WBC # FLD AUTO: 6.1 K/UL — SIGNIFICANT CHANGE UP (ref 4.8–10.8)

## 2022-10-21 PROCEDURE — 71045 X-RAY EXAM CHEST 1 VIEW: CPT | Mod: 26

## 2022-10-21 PROCEDURE — 99222 1ST HOSP IP/OBS MODERATE 55: CPT | Mod: GC,AI

## 2022-10-21 PROCEDURE — 93010 ELECTROCARDIOGRAM REPORT: CPT

## 2022-10-21 PROCEDURE — 99285 EMERGENCY DEPT VISIT HI MDM: CPT | Mod: GC

## 2022-10-21 RX ORDER — ASPIRIN/CALCIUM CARB/MAGNESIUM 324 MG
325 TABLET ORAL DAILY
Refills: 0 | Status: DISCONTINUED | OUTPATIENT
Start: 2022-10-21 | End: 2022-10-21

## 2022-10-21 NOTE — ED PROVIDER NOTE - PHYSICAL EXAMINATION
CONSTITUTIONAL: Well-developed; well-nourished; in no acute distress.   SKIN: Warm, dry  HEAD: Normocephalic; atraumatic  EYES: PERRL, EOMI, normal sclera and conjunctiva  ENT: No nasal discharge; airway clear.  NECK: Supple; non tender.  CARD:  Regular rate and rhythm. Normal S1, S2. Reproducible chest wall tenderness at midsternum.  RESP: No increased WOB. CTA b/l without wheezes, crackles, rhonchi  ABD: Normoactive BS. Soft, nontender, nondistended.   EXT: Normal ROM. No edema. 2+ PT, radial pulses.  NEURO: Alert, oriented, grossly unremarkable  PSYCH: Cooperative, appropriate.

## 2022-10-21 NOTE — ED PROVIDER NOTE - ATTENDING CONTRIBUTION TO CARE
67-year-old female with past medical history of questionable irregular heartbeat was told this after she was seeing a cardiologist 10 to 15 years ago but was told she needed no medications, htn, presents today for substernal chest pain described as pressure-like started approximately 40 minutes prior to arrival, constant, moderate intensity, radiating to her jaw, no alleviating or precipitating factors associated with palpitations and sob. no faily hx of pe/ dvt / slotting disorders.  Patient reports not a smoker.  No fever, chills, n/v, pleuritic cp, diaphoresis, cough, ha/lh/dizziness, numbness/tingling, neck pain/ stiffness, abd pain, diarrhea, constipation, melena/brbpr, urinary symptoms, trauma, weakness, edema, calf pain/swelling/erythema, sick contacts, recent travel or rash.     on exam: non toxic appearing pt sitting on stretcher speaking full sentences, no rash, mmm, neck supple. non-tender , radial pulses 2/4 b/l, no jvd, no pain to palpation to chest wall, no pain with movement/ not reproducible, ctabl w/ breath sounds present b/l, no wheezing or crackles,no accessory muscle use, no tachypnea, no stridor, bs present throughout all 4 quadrants, abd soft, nd, nt, no rebound tenderness or guarding, no cvat, FROM of ext, no calf pain/swelling/erythema, AAOx3. motor 5/5 and sensation intact throughout upper and lower ext. no focal deficits.

## 2022-10-21 NOTE — ED PROVIDER NOTE - OBJECTIVE STATEMENT
68 y/o F with PMH HTN presenting with sudden onset of substernal chest pain, SOB, jaw pain and headache 30 mins PTA. PT was resting at the time. Pt reports she has had similar symptoms twice in the past 3 years; was seen in ED at first episode but does not remember outcome, and did not seek medial attention for the second episode. Notes she was told by a doctor 10 years ago that she has an arrhythmia, did not have any tests done or meds started and never followed up with that doctor again. PT denies that exertion worsens pain or SOB. Denies leg swelling, abd pain, N/V, sweating, numbness/tingling, fevers. Pt's mother diagnosed with CAD in 40s. 66 y/o F with PMH HTN presenting with sudden onset of substernal chest pain, SOB, jaw pain and headache 30 mins PTA. PT was resting at the time. Took  prior to coming to ED. Pt reports she has had similar symptoms twice in the past 3 years; was seen in ED at first episode but does not remember outcome, and did not seek medial attention for the second episode. Notes she was told by a doctor 10 years ago that she has an arrhythmia, did not have any tests done or meds started and never followed up with that doctor again. PT denies that exertion worsens pain or SOB. Denies leg swelling, abd pain, N/V, sweating, numbness/tingling, fevers. Pt's mother diagnosed with CAD in 40s.

## 2022-10-21 NOTE — H&P ADULT - NSHPPHYSICALEXAM_GEN_ALL_CORE
LOS:     VITALS:   T(C): 36.6 (10-21-22 @ 18:57), Max: 36.6 (10-21-22 @ 18:57)  HR: 87 (10-21-22 @ 19:40) (87 - 135)  BP: 124/63 (10-21-22 @ 19:39) (124/63 - 180/96)  RR: 17 (10-21-22 @ 19:39) (17 - 18)  SpO2: 97% (10-21-22 @ 19:39) (96% - 97%)    GENERAL: NAD, lying in bed comfortably  HEAD:  Atraumatic, Normocephalic  EYES: EOMI, PERRLA, conjunctiva and sclera clear  ENT: Moist mucous membranes  NECK: Supple, No JVD  CHEST/LUNG: Clear to auscultation bilaterally; No rales, rhonchi, wheezing, or rubs. Unlabored respirations  HEART: Regular rate and rhythm; No murmurs, rubs, or gallops  ABDOMEN: BSx4; Soft, nontender, nondistended  EXTREMITIES:  2+ Peripheral Pulses, brisk capillary refill. No clubbing, cyanosis, or edema  NERVOUS SYSTEM:  A&Ox3, no focal deficits   SKIN: No rashes or lesions

## 2022-10-21 NOTE — ED PROVIDER NOTE - PROGRESS NOTE DETAILS
ED Attending ELMER Ayala  pt repeat hr in 80-90's more regular at this time will get repeat ekg, pt reports no current chest pain, feeling better, palpitations resolved, aware of current results will continue to monitor and reassess. ED Attending ELMER Ayala  pt repeat hr in 80-90's more regular at this time will get repeat ekg, pt reports no current chest pain, feeling better, palpitations resolved, aware of current results will continue to monitor and reassess. Pt reports took asa pta ( 300 mg). ED Attending Jamie, S  Patient family aware of all results, patient currently asymptomatic, agrees with plan for admission.  Attempts to call medical admitting resident, still no callback.  We will try again. ED Attending ELMER Ayala  Patient family aware of all results, patient currently asymptomatic, agrees with plan for admission.  Medical admitting team aware of pt and admission.

## 2022-10-21 NOTE — ED PROVIDER NOTE - CARE PLAN
Principal Discharge DX:	New onset atrial fibrillation   1 Principal Discharge DX:	New onset atrial fibrillation  Secondary Diagnosis:	Chest pain   Principal Discharge DX:	New onset atrial fibrillation  Assessment and plan of treatment:	Plan: Monitor, EKG, CXR, labs, reassess.  Secondary Diagnosis:	Chest pain

## 2022-10-21 NOTE — H&P ADULT - ASSESSMENT
67-year-old female with past medical history of HTN,  questionable irregular heartbeat was told this after she was seeing a cardiologist 10 to 15 years ago but was told she needed no medications, presents today for substernal chest pain described as pressure-like started approximately 40 minutes prior to arrival, constant, moderate intensity, radiating to her jaw, no alleviating or precipitating factors associated with palpitations and sob. At that time she was watching TV. no Family hx of pe/ dvt / slotting disorders.  No hx of tobacco use.   No fever, chills, n/v, pleuritic cp, diaphoresis, cough, ha/lh/dizziness, numbness/tingling, neck pain/ stiffness, abd pain, diarrhea, constipation, melena/brbpr, urinary symptoms, trauma, weakness, edema, calf pain/swelling/erythema, sick contacts, recent travel or rash.     In the ED: /96, , RR 18, T 97.8, 96% sat on RA. Initial EKG with T wave abnormalities in inferior leads and afib RVR. Repeat EKG w/out intervention NSR. D-dimer 212, trop 0.01, bnp 22.     Patient admitted for new onset PAF      #New Onset [place] afib Afib   -symptomatic on presentation   -CHADSVASC2 score of 3- Start therapeutic lovenox   -start lopressor 12.5 BID, titrate up as tolerated  -f/u TSH   -F/u echo  -Tele monitoring   -cardio eval     #Hx of HTN   -c/w home enalapril 10 mg       DVT ppx: Lovenox  Diet: DASH  GI ppx: Protonix  Dispo: tele  Code: Full    67-year-old female with past medical history of HTN,  questionable irregular heartbeat was told this after she was seeing a cardiologist 10 to 15 years ago but was told she needed no medications, presents today for substernal chest pain described as pressure-like started approximately 40 minutes prior to arrival, constant, moderate intensity, radiating to her jaw, no alleviating or precipitating factors associated with palpitations and sob. At that time she was watching TV. no Family hx of pe/ dvt / slotting disorders.  No hx of tobacco use.   No fever, chills, n/v, pleuritic cp, diaphoresis, cough, ha/lh/dizziness, numbness/tingling, neck pain/ stiffness, abd pain, diarrhea, constipation, melena/brbpr, urinary symptoms, trauma, weakness, edema, calf pain/swelling/erythema, sick contacts, recent travel or rash.     In the ED: /96, , RR 18, T 97.8, 96% sat on RA. Initial EKG with T wave abnormalities in inferior leads and afib RVR. Repeat EKG w/out intervention NSR. D-dimer 212, trop 0.01, bnp 22.     Patient admitted for new onset PAF      #New Onset [place] afib Afib   -symptomatic on presentation   -CHADSVASC2 score of 3- Start therapeutic lovenox   -start lopressor 12.5 BID, titrate up as tolerated  -f/u TSH   -F/u echo  -Tele monitoring   -cardio eval     #Hx of HTN   -c/w home enalapril 5 mg , and HCTZ 12.5 mg       DVT ppx: Lovenox  Diet: DASH  GI ppx: Protonix  Dispo: tele  Code: Full

## 2022-10-21 NOTE — H&P ADULT - ATTENDING COMMENTS
HPI:  67-year-old female with past medical history of HTN,  questionable irregular heartbeat was told this after she was seeing a cardiologist 10 to 15 years ago but was told she needed no medications, presents today for substernal chest pain described as pressure-like started approximately 40 minutes prior to arrival, constant, moderate intensity, radiating to her jaw, no alleviating or precipitating factors associated with palpitations and sob. At that time she was watching TV. no Family hx of pe/ dvt / slotting disorders.  No hx of tobacco use.   No fever, chills, n/v, pleuritic cp, diaphoresis, cough, ha/lh/dizziness, numbness/tingling, neck pain/ stiffness, abd pain, diarrhea, constipation, melena/brbpr, urinary symptoms, trauma, weakness, edema, calf pain/swelling/erythema, sick contacts, recent travel or rash.     In the ED: /96, , RR 18, T 97.8, 96% sat on RA. Initial EKG with T wave abnormalities in inferior leads and afib RVR. Repeat EKG w/out intervention NSR. D-dimer 212, trop 0.01, bnp 22.      (21 Oct 2022 23:19)  REVIEW OF SYSTEMS: see cc/HPI   CONSTITUTIONAL: No weakness, fevers or chills  EYES/ENT: No visual changes;  No vertigo or throat pain   NECK: No pain or stiffness  RESPIRATORY: No cough, wheezing, hemoptysis; No shortness of breath  CARDIOVASCULAR: (+) chest pain (+) palpitations  GASTROINTESTINAL: No abdominal or epigastric pain. No nausea, vomiting, or hematemesis; No diarrhea or constipation. No melena or hematochezia.  GENITOURINARY: No dysuria, frequency or hematuria  NEUROLOGICAL: No numbness or weakness  SKIN: No itching, rashes    A/p   New onset vs. Chronic P.A.Fib   -admit to tele  -agree w/ Lopressor and Lovenox for now   -serial CE and EKG   -2D echo and TSH   -Cardiology eval / EP     H/o HTN   -c/w enalapril     PATIENT SEEN by ATTENDING 10/21/22 ( note revised 10/22).

## 2022-10-21 NOTE — H&P ADULT - NSHPLABSRESULTS_GEN_ALL_CORE
LOS:     VITALS:   T(C): 36.6 (10-21-22 @ 18:57), Max: 36.6 (10-21-22 @ 18:57)  HR: 87 (10-21-22 @ 19:40) (87 - 135)  BP: 124/63 (10-21-22 @ 19:39) (124/63 - 180/96)  RR: 17 (10-21-22 @ 19:39) (17 - 18)  SpO2: 97% (10-21-22 @ 19:39) (96% - 97%)    GENERAL: NAD, lying in bed comfortably  HEAD:  Atraumatic, Normocephalic  EYES: EOMI, PERRLA, conjunctiva and sclera clear  ENT: Moist mucous membranes  NECK: Supple, No JVD  CHEST/LUNG: Clear to auscultation bilaterally; No rales, rhonchi, wheezing, or rubs. Unlabored respirations  HEART: Regular rate and rhythm; No murmurs, rubs, or gallops  ABDOMEN: BSx4; Soft, nontender, nondistended  EXTREMITIES:  2+ Peripheral Pulses, brisk capillary refill. No clubbing, cyanosis, or edema  NERVOUS SYSTEM:  A&Ox3, no focal deficits   SKIN: No rashes or lesions LOS:     VITALS:   T(C): 36.6 (10-21-22 @ 18:57), Max: 36.6 (10-21-22 @ 18:57)  HR: 87 (10-21-22 @ 19:40) (87 - 135)  BP: 124/63 (10-21-22 @ 19:39) (124/63 - 180/96)  RR: 17 (10-21-22 @ 19:39) (17 - 18)  SpO2: 97% (10-21-22 @ 19:39) (96% - 97%)    GENERAL: NAD, lying in bed comfortably  HEAD:  Atraumatic, Normocephalic  EYES: EOMI, PERRLA, conjunctiva and sclera clear  ENT: Moist mucous membranes  NECK: Supple, No JVD  CHEST/LUNG: Clear to auscultation bilaterally; No rales, rhonchi, wheezing, or rubs. Unlabored respirations  HEART: RRR  ABDOMEN: BSx4; Soft, nontender, nondistended  EXTREMITIES:  2+ Peripheral Pulses, brisk capillary refill. No clubbing, cyanosis, or edema  NERVOUS SYSTEM:  A&Ox3, no focal deficits   SKIN: No rashes or lesions

## 2022-10-21 NOTE — ED ADULT NURSE NOTE - NSICDXPASTMEDICALHX_GEN_ALL_CORE_FT
PAST MEDICAL HISTORY:  Depression     GERD (gastroesophageal reflux disease)     Hypertension     PRISCA on CPAP

## 2022-10-21 NOTE — ED PROVIDER NOTE - NS ED ROS FT
Constitutional: No fever  Eyes:  No visual changes, eye pain, or discharge.  ENMT:  No hearing changes, ear pain, sore throat, runny nose, or difficulty swallowing  Cardiac: +chest pain, SOB. No or edema. No chest pain with exertion.  Respiratory:  No cough or respiratory distress.   GI:  No nausea, vomiting, diarrhea or abdominal pain.  :  No dysuria, frequency or burning.  MS:  No myalgia, muscle weakness, joint pain or back pain.  Neuro: +headache. No weakness.  No LOC.  Skin:  No skin rash.

## 2022-10-21 NOTE — H&P ADULT - HISTORY OF PRESENT ILLNESS
67-year-old female with past medical history of questionable irregular heartbeat was told this after she was seeing a cardiologist 10 to 15 years ago but was told she needed no medications, htn, presents today for substernal chest pain described as pressure-like started approximately 40 minutes prior to arrival, constant, moderate intensity, radiating to her jaw, no alleviating or precipitating factors associated with palpitations and sob. no faily hx of pe/ dvt / slotting disorders.  Patient reports not a smoker.  No fever, chills, n/v, pleuritic cp, diaphoresis, cough, ha/lh/dizziness, numbness/tingling, neck pain/ stiffness, abd pain, diarrhea, constipation, melena/brbpr, urinary symptoms, trauma, weakness, edema, calf pain/swelling/erythema, sick contacts, recent travel or rash.     In the ED: /96, , RR 18, T 97.8, 96% sat on RA. Initial EKG with T wave abnormalities in inferior leads and afib RVR. Repeat EKG w/out intervention NSR. D-dimer 212, trop 0.01, bnp 22.      67-year-old female with past medical history of HTN,  questionable irregular heartbeat was told this after she was seeing a cardiologist 10 to 15 years ago but was told she needed no medications, presents today for substernal chest pain described as pressure-like started approximately 40 minutes prior to arrival, constant, moderate intensity, radiating to her jaw, no alleviating or precipitating factors associated with palpitations and sob. At that time she was watching TV. no Family hx of pe/ dvt / slotting disorders.  No hx of tobacco use.   No fever, chills, n/v, pleuritic cp, diaphoresis, cough, ha/lh/dizziness, numbness/tingling, neck pain/ stiffness, abd pain, diarrhea, constipation, melena/brbpr, urinary symptoms, trauma, weakness, edema, calf pain/swelling/erythema, sick contacts, recent travel or rash.     In the ED: /96, , RR 18, T 97.8, 96% sat on RA. Initial EKG with T wave abnormalities in inferior leads and afib RVR. Repeat EKG w/out intervention NSR. D-dimer 212, trop 0.01, bnp 22.

## 2022-10-22 LAB
A1C WITH ESTIMATED AVERAGE GLUCOSE RESULT: 6 % — HIGH (ref 4–5.6)
ALBUMIN SERPL ELPH-MCNC: 3.6 G/DL — SIGNIFICANT CHANGE UP (ref 3.5–5.2)
ALP SERPL-CCNC: 53 U/L — SIGNIFICANT CHANGE UP (ref 30–115)
ALT FLD-CCNC: 14 U/L — SIGNIFICANT CHANGE UP (ref 0–41)
ANION GAP SERPL CALC-SCNC: 10 MMOL/L — SIGNIFICANT CHANGE UP (ref 7–14)
AST SERPL-CCNC: 18 U/L — SIGNIFICANT CHANGE UP (ref 0–41)
BASOPHILS # BLD AUTO: 0.04 K/UL — SIGNIFICANT CHANGE UP (ref 0–0.2)
BASOPHILS # BLD AUTO: 0.04 K/UL — SIGNIFICANT CHANGE UP (ref 0–0.2)
BASOPHILS # BLD AUTO: 0.05 K/UL — SIGNIFICANT CHANGE UP (ref 0–0.2)
BASOPHILS NFR BLD AUTO: 0.7 % — SIGNIFICANT CHANGE UP (ref 0–1)
BASOPHILS NFR BLD AUTO: 0.8 % — SIGNIFICANT CHANGE UP (ref 0–1)
BASOPHILS NFR BLD AUTO: 0.9 % — SIGNIFICANT CHANGE UP (ref 0–1)
BILIRUB SERPL-MCNC: 0.3 MG/DL — SIGNIFICANT CHANGE UP (ref 0.2–1.2)
BUN SERPL-MCNC: 21 MG/DL — HIGH (ref 10–20)
CALCIUM SERPL-MCNC: 8.8 MG/DL — SIGNIFICANT CHANGE UP (ref 8.4–10.4)
CHLORIDE SERPL-SCNC: 107 MMOL/L — SIGNIFICANT CHANGE UP (ref 98–110)
CHOLEST SERPL-MCNC: 186 MG/DL — SIGNIFICANT CHANGE UP
CO2 SERPL-SCNC: 28 MMOL/L — SIGNIFICANT CHANGE UP (ref 17–32)
CREAT SERPL-MCNC: 0.7 MG/DL — SIGNIFICANT CHANGE UP (ref 0.7–1.5)
EGFR: 95 ML/MIN/1.73M2 — SIGNIFICANT CHANGE UP
EOSINOPHIL # BLD AUTO: 0.1 K/UL — SIGNIFICANT CHANGE UP (ref 0–0.7)
EOSINOPHIL # BLD AUTO: 0.13 K/UL — SIGNIFICANT CHANGE UP (ref 0–0.7)
EOSINOPHIL # BLD AUTO: 0.19 K/UL — SIGNIFICANT CHANGE UP (ref 0–0.7)
EOSINOPHIL NFR BLD AUTO: 1.8 % — SIGNIFICANT CHANGE UP (ref 0–8)
EOSINOPHIL NFR BLD AUTO: 2.3 % — SIGNIFICANT CHANGE UP (ref 0–8)
EOSINOPHIL NFR BLD AUTO: 3.7 % — SIGNIFICANT CHANGE UP (ref 0–8)
ESTIMATED AVERAGE GLUCOSE: 126 MG/DL — HIGH (ref 68–114)
GLUCOSE SERPL-MCNC: 98 MG/DL — SIGNIFICANT CHANGE UP (ref 70–99)
HCT VFR BLD CALC: 29.3 % — LOW (ref 37–47)
HCT VFR BLD CALC: 30.8 % — LOW (ref 37–47)
HCT VFR BLD CALC: 31.5 % — LOW (ref 37–47)
HCV AB S/CO SERPL IA: 5.48 COI — HIGH
HCV AB SERPL-IMP: REACTIVE
HDLC SERPL-MCNC: 62 MG/DL — SIGNIFICANT CHANGE UP
HGB BLD-MCNC: 9.3 G/DL — LOW (ref 12–16)
HGB BLD-MCNC: 9.8 G/DL — LOW (ref 12–16)
HGB BLD-MCNC: 9.9 G/DL — LOW (ref 12–16)
IMM GRANULOCYTES NFR BLD AUTO: 0.2 % — SIGNIFICANT CHANGE UP (ref 0.1–0.3)
LIPID PNL WITH DIRECT LDL SERPL: 112 MG/DL — HIGH
LYMPHOCYTES # BLD AUTO: 1.85 K/UL — SIGNIFICANT CHANGE UP (ref 1.2–3.4)
LYMPHOCYTES # BLD AUTO: 2.21 K/UL — SIGNIFICANT CHANGE UP (ref 1.2–3.4)
LYMPHOCYTES # BLD AUTO: 2.54 K/UL — SIGNIFICANT CHANGE UP (ref 1.2–3.4)
LYMPHOCYTES # BLD AUTO: 32.9 % — SIGNIFICANT CHANGE UP (ref 20.5–51.1)
LYMPHOCYTES # BLD AUTO: 38.7 % — SIGNIFICANT CHANGE UP (ref 20.5–51.1)
LYMPHOCYTES # BLD AUTO: 49 % — SIGNIFICANT CHANGE UP (ref 20.5–51.1)
MAGNESIUM SERPL-MCNC: 2 MG/DL — SIGNIFICANT CHANGE UP (ref 1.8–2.4)
MCHC RBC-ENTMCNC: 25.2 PG — LOW (ref 27–31)
MCHC RBC-ENTMCNC: 25.4 PG — LOW (ref 27–31)
MCHC RBC-ENTMCNC: 25.5 PG — LOW (ref 27–31)
MCHC RBC-ENTMCNC: 31.4 G/DL — LOW (ref 32–37)
MCHC RBC-ENTMCNC: 31.7 G/DL — LOW (ref 32–37)
MCHC RBC-ENTMCNC: 31.8 G/DL — LOW (ref 32–37)
MCV RBC AUTO: 79.8 FL — LOW (ref 81–99)
MCV RBC AUTO: 80.2 FL — LOW (ref 81–99)
MCV RBC AUTO: 80.5 FL — LOW (ref 81–99)
MONOCYTES # BLD AUTO: 0.34 K/UL — SIGNIFICANT CHANGE UP (ref 0.1–0.6)
MONOCYTES # BLD AUTO: 0.4 K/UL — SIGNIFICANT CHANGE UP (ref 0.1–0.6)
MONOCYTES # BLD AUTO: 0.41 K/UL — SIGNIFICANT CHANGE UP (ref 0.1–0.6)
MONOCYTES NFR BLD AUTO: 6.6 % — SIGNIFICANT CHANGE UP (ref 1.7–9.3)
MONOCYTES NFR BLD AUTO: 7.1 % — SIGNIFICANT CHANGE UP (ref 1.7–9.3)
MONOCYTES NFR BLD AUTO: 7.2 % — SIGNIFICANT CHANGE UP (ref 1.7–9.3)
NEUTROPHILS # BLD AUTO: 2.06 K/UL — SIGNIFICANT CHANGE UP (ref 1.4–6.5)
NEUTROPHILS # BLD AUTO: 2.91 K/UL — SIGNIFICANT CHANGE UP (ref 1.4–6.5)
NEUTROPHILS # BLD AUTO: 3.22 K/UL — SIGNIFICANT CHANGE UP (ref 1.4–6.5)
NEUTROPHILS NFR BLD AUTO: 39.7 % — LOW (ref 42.2–75.2)
NEUTROPHILS NFR BLD AUTO: 50.9 % — SIGNIFICANT CHANGE UP (ref 42.2–75.2)
NEUTROPHILS NFR BLD AUTO: 57.1 % — SIGNIFICANT CHANGE UP (ref 42.2–75.2)
NON HDL CHOLESTEROL: 124 MG/DL — SIGNIFICANT CHANGE UP
NRBC # BLD: 0 /100 WBCS — SIGNIFICANT CHANGE UP (ref 0–0)
PLATELET # BLD AUTO: 194 K/UL — SIGNIFICANT CHANGE UP (ref 130–400)
PLATELET # BLD AUTO: 198 K/UL — SIGNIFICANT CHANGE UP (ref 130–400)
PLATELET # BLD AUTO: 199 K/UL — SIGNIFICANT CHANGE UP (ref 130–400)
POTASSIUM SERPL-MCNC: 3.9 MMOL/L — SIGNIFICANT CHANGE UP (ref 3.5–5)
POTASSIUM SERPL-SCNC: 3.9 MMOL/L — SIGNIFICANT CHANGE UP (ref 3.5–5)
PROT SERPL-MCNC: 6 G/DL — SIGNIFICANT CHANGE UP (ref 6–8)
RBC # BLD: 3.64 M/UL — LOW (ref 4.2–5.4)
RBC # BLD: 3.86 M/UL — LOW (ref 4.2–5.4)
RBC # BLD: 3.93 M/UL — LOW (ref 4.2–5.4)
RBC # FLD: 16.2 % — HIGH (ref 11.5–14.5)
RBC # FLD: 16.2 % — HIGH (ref 11.5–14.5)
RBC # FLD: 16.4 % — HIGH (ref 11.5–14.5)
SODIUM SERPL-SCNC: 145 MMOL/L — SIGNIFICANT CHANGE UP (ref 135–146)
TRIGL SERPL-MCNC: 61 MG/DL — SIGNIFICANT CHANGE UP
TROPONIN T SERPL-MCNC: <0.01 NG/ML — SIGNIFICANT CHANGE UP
TSH SERPL-MCNC: 4.4 UIU/ML — HIGH (ref 0.27–4.2)
WBC # BLD: 5.18 K/UL — SIGNIFICANT CHANGE UP (ref 4.8–10.8)
WBC # BLD: 5.63 K/UL — SIGNIFICANT CHANGE UP (ref 4.8–10.8)
WBC # BLD: 5.71 K/UL — SIGNIFICANT CHANGE UP (ref 4.8–10.8)
WBC # FLD AUTO: 5.18 K/UL — SIGNIFICANT CHANGE UP (ref 4.8–10.8)
WBC # FLD AUTO: 5.63 K/UL — SIGNIFICANT CHANGE UP (ref 4.8–10.8)
WBC # FLD AUTO: 5.71 K/UL — SIGNIFICANT CHANGE UP (ref 4.8–10.8)

## 2022-10-22 PROCEDURE — 93306 TTE W/DOPPLER COMPLETE: CPT | Mod: 26

## 2022-10-22 PROCEDURE — 99222 1ST HOSP IP/OBS MODERATE 55: CPT

## 2022-10-22 PROCEDURE — 99233 SBSQ HOSP IP/OBS HIGH 50: CPT

## 2022-10-22 RX ORDER — HYDROCHLOROTHIAZIDE 25 MG
12.5 TABLET ORAL DAILY
Refills: 0 | Status: DISCONTINUED | OUTPATIENT
Start: 2022-10-22 | End: 2022-10-24

## 2022-10-22 RX ORDER — METOPROLOL TARTRATE 50 MG
12.5 TABLET ORAL
Refills: 0 | Status: DISCONTINUED | OUTPATIENT
Start: 2022-10-22 | End: 2022-10-24

## 2022-10-22 RX ORDER — ESCITALOPRAM OXALATE 10 MG/1
1 TABLET, FILM COATED ORAL
Qty: 0 | Refills: 0 | DISCHARGE

## 2022-10-22 RX ORDER — ENOXAPARIN SODIUM 100 MG/ML
80 INJECTION SUBCUTANEOUS EVERY 12 HOURS
Refills: 0 | Status: DISCONTINUED | OUTPATIENT
Start: 2022-10-22 | End: 2022-10-23

## 2022-10-22 RX ORDER — ENOXAPARIN SODIUM 100 MG/ML
80 INJECTION SUBCUTANEOUS EVERY 12 HOURS
Refills: 0 | Status: DISCONTINUED | OUTPATIENT
Start: 2022-10-22 | End: 2022-10-22

## 2022-10-22 RX ORDER — PANTOPRAZOLE SODIUM 20 MG/1
40 TABLET, DELAYED RELEASE ORAL
Refills: 0 | Status: DISCONTINUED | OUTPATIENT
Start: 2022-10-22 | End: 2022-10-24

## 2022-10-22 RX ORDER — APIXABAN 2.5 MG/1
5 TABLET, FILM COATED ORAL EVERY 12 HOURS
Refills: 0 | Status: DISCONTINUED | OUTPATIENT
Start: 2022-10-22 | End: 2022-10-22

## 2022-10-22 RX ADMIN — PANTOPRAZOLE SODIUM 40 MILLIGRAM(S): 20 TABLET, DELAYED RELEASE ORAL at 06:08

## 2022-10-22 RX ADMIN — Medication 12.5 MILLIGRAM(S): at 18:02

## 2022-10-22 RX ADMIN — Medication 12.5 MILLIGRAM(S): at 06:08

## 2022-10-22 RX ADMIN — Medication 12.5 MILLIGRAM(S): at 06:07

## 2022-10-22 RX ADMIN — Medication 5 MILLIGRAM(S): at 06:08

## 2022-10-22 NOTE — CONSULT NOTE ADULT - SUBJECTIVE AND OBJECTIVE BOX
Gastroenterology Consultation:    Patient is a 67y old  Female who presents with a chief complaint of CHEST PAIN (22 Oct 2022 06:56)        Admitted on: 10-21-22      HPI:  67-year-old female with past medical history of HTN,  questionable irregular heartbeat was told this after she was seeing a cardiologist 10 to 15 years ago but was told she needed no medications, presents today for substernal chest pain described as pressure-like started approximately 40 minutes prior to arrival, constant, moderate intensity, radiating to her jaw, no alleviating or precipitating factors associated with palpitations and sob. At that time she was watching TV. no Family hx of pe/ dvt / slotting disorders.  No hx of tobacco use.   No fever, chills, n/v, pleuritic cp, diaphoresis, cough, ha/lh/dizziness, numbness/tingling, neck pain/ stiffness, abd pain, diarrhea, constipation, melena/brbpr, urinary symptoms, trauma, weakness, edema, calf pain/swelling/erythema, sick contacts, recent travel or rash.     In the ED: /96, , RR 18, T 97.8, 96% sat on RA. Initial EKG with T wave abnormalities in inferior leads and afib RVR. Repeat EKG w/out intervention NSR. D-dimer 212, trop 0.01, bnp 22.      (21 Oct 2022 23:19)        Prior EGD: none on chart    Prior Colonoscopy: none on chart       PAST MEDICAL & SURGICAL HISTORY:  Hypertension      GERD (gastroesophageal reflux disease)      Depression      PRISCA on CPAP      S/P cholecystectomy      S/P appendectomy      S/P ectopic pregnancy            FAMILY HISTORY:  Family history of esophageal cancer (Father)        Social History:  Tobacco: -ve  Alcohol: -ve  Drugs: -ve    Home Medications:  Dexilant 60 mg oral delayed release capsule: 1 cap(s) orally once a day (04 Jan 2021 06:37)  enalapril-hydrochlorothiazide 5 mg-12.5 mg oral tablet: 1 tab(s) orally once a day (04 Jan 2021 06:37)  senna oral tablet: 2 tab(s) orally once a day (at bedtime) (05 Jan 2021 09:37)        MEDICATIONS  (STANDING):  enalapril 5 milliGRAM(s) Oral daily  hydrochlorothiazide 12.5 milliGRAM(s) Oral daily  metoprolol tartrate 12.5 milliGRAM(s) Oral two times a day  pantoprazole    Tablet 40 milliGRAM(s) Oral before breakfast    MEDICATIONS  (PRN):      Allergies  penicillin (Anaphylaxis)      Review of Systems:   Constitutional:  No Fever, No Chills  ENT/Mouth:  No Hearing Changes,  No Difficulty Swallowing  Eyes:  No Eye Pain, No Vision Changes  Cardiovascular:  No Chest Pain, No Palpitations  Respiratory:  No Cough, No Dyspnea  Gastrointestinal:  As described in HPI  Musculoskeletal:  No Joint Swelling, No Back Pain  Skin:  No Skin Lesions, No Jaundice  Neuro:  No Syncope, No Dizziness  Heme/Lymph:  No Bruising, No Bleeding.          Physical Examination:  T(C): 36.1 (10-22-22 @ 07:33), Max: 37.1 (10-22-22 @ 00:25)  HR: 59 (10-22-22 @ 07:33) (59 - 135)  BP: 126/58 (10-22-22 @ 07:33) (123/61 - 180/96)  RR: 18 (10-22-22 @ 07:33) (17 - 18)  SpO2: 95% (10-22-22 @ 07:33) (95% - 97%)  Height (cm): 160 (10-21-22 @ 18:57)        GENERAL: AAOx3, no acute distress.  HEAD:  Atraumatic, Normocephalic  EYES: conjunctiva and sclera clear  NECK: Supple, no JVD or thyromegaly  CHEST/LUNG: Clear to auscultation bilaterally; No wheeze, rhonchi, or rales  HEART: Regular rate and rhythm; normal S1, S2, No murmurs.  ABDOMEN: Soft, nontender, nondistended; Bowel sounds present  NEUROLOGY: No asterixis or tremor.   SKIN: Intact, no jaundice        Data:                        9.3    5.18  )-----------( 194      ( 22 Oct 2022 07:03 )             29.3     Hgb Trend:  9.3  10-22-22 @ 07:03  11.1  10-21-22 @ 19:19      10-22    145  |  107  |  21<H>  ----------------------------<  98  3.9   |  28  |  0.7    Ca    8.8      22 Oct 2022 07:03  Mg     2.0     10-22    TPro  6.0  /  Alb  3.6  /  TBili  0.3  /  DBili  x   /  AST  18  /  ALT  14  /  AlkPhos  53  10-22    Liver panel trend:  TBili 0.3   /   AST 18   /   ALT 14   /   AlkP 53   /   Tptn 6.0   /   Alb 3.6    /   DBili --      10-22  TBili 0.4   /   AST 29   /   ALT 20   /   AlkP 64   /   Tptn 7.7   /   Alb 4.8    /   DBili --      10-21              Radiology:

## 2022-10-22 NOTE — CONSULT NOTE ADULT - ATTENDING COMMENTS
Paroxysmal AF - currently in SR      - Medical therapy as above  - TTE  - CCTA Paroxysmal AF - currently in SR  History of recurrent diverticular bleeding      - TTE  - CCTA  - Continue Lovenox  - GI evaluation  - EP evaluation

## 2022-10-22 NOTE — PATIENT PROFILE ADULT - FALL HARM RISK - HARM RISK INTERVENTIONS
Assistance with ambulation/Assistance OOB with selected safe patient handling equipment/Communicate Risk of Fall with Harm to all staff/Discuss with provider need for PT consult/Monitor gait and stability/Provide patient with walking aids - walker, cane, crutches/Reinforce activity limits and safety measures with patient and family/Sit up slowly, dangle for a short time, stand at bedside before walking/Tailored Fall Risk Interventions/Visual Cue: Yellow wristband and red socks/Bed in lowest position, wheels locked, appropriate side rails in place/Call bell, personal items and telephone in reach/Instruct patient to call for assistance before getting out of bed or chair/Non-slip footwear when patient is out of bed/[place] to call system/Physically safe environment - no spills, clutter or unnecessary equipment/Purposeful Proactive Rounding/Room/bathroom lighting operational, light cord in reach

## 2022-10-22 NOTE — CONSULT NOTE ADULT - ASSESSMENT
67-year-old female with past medical history of HTN,  questionable irregular heartbeat was told this after she was seeing a cardiologist 10 to 15 years ago but was told she needed no medications, presents today for substernal chest pain described as pressure-like started approximately 40 minutes prior to arrival, constant, moderate intensity, radiating to her jaw, no alleviating or precipitating factors associated with palpitations and sob. found to be in Afib. GI is consulted for clearance for anticoagulation.    # Paroxysmal Afib  # H/O GI bleed?  - patient is hemodynamically stable  - labs reviewed  - imaging reviewed     recommendations:  - no contraindications from GI stand point for anticoagulation   - management of anticoagulation per primary team  - monitor H/H  - please recall GI with any signs of gi bleeding  67-year-old female with past medical history of HTN,  questionable irregular heartbeat was told this after she was seeing a cardiologist 10 to 15 years ago but was told she needed no medications, presents today for substernal chest pain described as pressure-like started approximately 40 minutes prior to arrival, constant, moderate intensity, radiating to her jaw, no alleviating or precipitating factors associated with palpitations and sob. found to be in Afib. GI is consulted for clearance for anticoagulation.    # Paroxysmal Afib  # H/O Diverticulosis per the patient  - patient is hemodynamically stable  - labs reviewed  - imaging reviewed     recommendations:  - no contraindications from GI stand point for anticoagulation   - management of anticoagulation per primary team  - monitor H/H  - please recall GI with any signs of gi bleeding or further drop of Hb 67-year-old female with past medical history of HTN,  questionable irregular heartbeat was told this after she was seeing a cardiologist 10 to 15 years ago but was told she needed no medications, presents today for substernal chest pain described as pressure-like started approximately 40 minutes prior to arrival, constant, moderate intensity, radiating to her jaw, no alleviating or precipitating factors associated with palpitations and sob. found to be in Afib. GI is consulted for clearance for anticoagulation.    # Paroxysmal Afib  # H/O Diverticulosis per the patient  - patient is hemodynamically stable  - labs reviewed  - imaging reviewed   - reports last colonoscopy 3 years ago in [place]    recommendations:  - get colonoscopy report  - no contraindications from GI stand point for anticoagulation as there is no evidence of overt GI bleeding  - management of anticoagulation per primary team  - monitor H/H  - please recall GI with any signs of gi bleeding or drop of Hb

## 2022-10-22 NOTE — CONSULT NOTE ADULT - ASSESSMENT
67 yr old female with hx of HTN presents today for substernal chest pain described as pressure-like started approximately 40 minutes prior to arrival, constant, moderate intensity, radiating to her jaw, no alleviating or precipitating factors associated with palpitations and sob. At that time she was watching TV.   In the ED: /96, , RR 18, T 97.8, 96% sat on RA.   Initial EKG with T wave abnormalities in inferior leads and Afib RVR.     Cardiology consulted for Afib with RVR     # New Onset vs Paroxsymal Afib   # Hypertensive Urgency   - asymptomatic and rate controlled at encounter  -  (on admission) --> 91 --> 76  - /96 --> 123/61  - trop 0.01 --> trend   - BNP: 22  - EKG (on admission): Afib with RVR  - Repeat EKG: NSR  - CHADSVASC2 score of 3  - d/c Lovenox and start Eliquis   - start lopressor 12.5 BID, titrate up as tolerated  - c/w home enalapril 5mg and HCTZ 12.5 mg    - check echo  - check TSH  - tele monitoring    67 yr old female with hx of HTN presents for palpitation. Pt states she has been having palpitation for past 1 yr and recently the frequency is increasing (2-3 episodes per week) associated with substernal chest pain. Last night pt suddenly developed palpitation associated with pressure-like started approximately 40 minutes prior to arrival, constant, moderate intensity, radiating to her jaw and lightheadedness. Pt also states she takes ASA sometimes during her attacks and feels better.   In the ED: /96, , RR 18, T 97.8, 96% sat on RA.   Initial EKG with T wave abnormalities in inferior leads and Afib RVR.     Cardiology consulted for Afib with RVR     # Paroxsymal Afib with RVR associated with chest pain   # Hypertensive Urgency   - asymptomatic and rate controlled at encounter  -  (on admission) --> 91 --> 76  - /96 --> 123/61  - no events on tele   - trop 0.01 --> trend   - BNP: 22  - EKG (on admission): Afib with RVR with ST depression in Ant and Inf leads   - Repeat EKG: NSR  - CHADSVASC2 score of 3  - pt states getting sob climbing up 2 flights of stairs   - d/c Lovenox and start Eliquis   - start lopressor 12.5 BID, titrate up as tolerated  - c/w home enalapril 5mg and HCTZ 12.5 mg   - check echo  - check CCTA   - check TSH  - tele monitoring    67 yr old female with hx of HTN presents for palpitation. Pt states she has been having palpitation for past 1 yr and recently the frequency is increasing (2-3 episodes per week) associated with substernal chest pain. Last night pt suddenly developed palpitation associated with pressure-like started approximately 40 minutes prior to arrival, constant, moderate intensity, radiating to her jaw and lightheadedness. Pt also states she takes ASA sometimes during her attacks and feels better.   In the ED: /96, , RR 18, T 97.8, 96% sat on RA.   Initial EKG with T wave abnormalities in inferior leads and Afib RVR.     Cardiology consulted for Afib with RVR     # Paroxsymal Afib with RVR associated with chest pain   # Hypertensive Urgency   - asymptomatic and rate controlled at encounter  -  (on admission) --> 91 --> 76  - /96 --> 123/61  - no events on tele   - trop 0.01 --> trend   - BNP: 22  - EKG (on admission): Afib with RVR with ST depression in Ant and Inf leads   - Repeat EKG: NSR  - CHADSVASC2 score of 3  - pt states getting sob climbing up 2 flights of stairs   - c/w Lovenox  - start lopressor 12.5 BID, titrate up as tolerated  - c/w home enalapril 5mg and HCTZ 12.5 mg   - check echo  - check CCTA   - check TSH  - tele monitoring    67 yr old female with hx of HTN presents for palpitation. Pt states she has been having palpitation for past 1 yr and recently the frequency is increasing (2-3 episodes per week) associated with substernal chest pain. Last night pt suddenly developed palpitations associated with pressure-like started approximately 40 minutes prior to arrival, constant, moderate intensity, radiating to her jaw and lightheadedness. Pt also states she takes ASA sometimes during her attacks and feels better.   In the ED: /96, , RR 18, T 97.8, 96% sat on RA.   Initial EKG with T wave abnormalities in inferior leads and Afib RVR.     Cardiology consulted for Afib with RVR.    # Paroxsymal Afib with RVR associated with chest pain   # Hypertensive Urgency     - asymptomatic and rate controlled at encounter  -  (on admission) --> 91 --> 76  - /96 --> 123/61  - no events on tele   - trop 0.01 --> trend   - BNP: 22  - EKG (on admission): Afib with RVR with ST depression in Ant and Inf leads   - Repeat EKG: NSR  - CHADSVASC2 score of 3  - pt states getting sob climbing up 2 flights of stairs     Recommend:  - c/w Lovenox  - start lopressor 12.5 BID, titrate up as tolerated  - c/w home enalapril 5mg and HCTZ 12.5 mg   - check echo  - check CCTA   - check TSH  - tele monitoring

## 2022-10-22 NOTE — ED ADULT NURSE REASSESSMENT NOTE - NS ED NURSE REASSESS COMMENT FT1
Assumed care from previous day shift nurse Rosa c/o chest pain , awaiting for transport for telemetry bed , AO x 4 , report was already given to 3 C nurse per previous nurse Rosa. Pt denies any chest pain this time , no SOB , denies nausea , denies abdominal pain , denies headache , denies dizziness , IVL intact .

## 2022-10-22 NOTE — CONSULT NOTE ADULT - SUBJECTIVE AND OBJECTIVE BOX
Outpt cardiologist: None    HPI:  67-year-old female with past medical history of HTN,  questionable irregular heartbeat was told this after she was seeing a cardiologist 10 to 15 years ago but was told she needed no medications, presents today for substernal chest pain described as pressure-like started approximately 40 minutes prior to arrival, constant, moderate intensity, radiating to her jaw, no alleviating or precipitating factors associated with palpitations and sob. At that time she was watching TV. no Family hx of pe/ dvt / slotting disorders.  No hx of tobacco use.   No fever, chills, n/v, pleuritic cp, diaphoresis, cough, ha/lh/dizziness, numbness/tingling, neck pain/ stiffness, abd pain, diarrhea, constipation, melena/brbpr, urinary symptoms, trauma, weakness, edema, calf pain/swelling/erythema, sick contacts, recent travel or rash.     In the ED: /96, , RR 18, T 97.8, 96% sat on RA. Initial EKG with T wave abnormalities in inferior leads and afib RVR. Repeat EKG w/out intervention NSR. D-dimer 212, trop 0.01, bnp 22.      (21 Oct 2022 23:19)      PAST MEDICAL & SURGICAL HISTORY  Hypertension    GERD (gastroesophageal reflux disease)    Depression    PRISCA on CPAP    S/P cholecystectomy    S/P appendectomy    S/P ectopic pregnancy        FAMILY HISTORY:  FAMILY HISTORY:  Family history of esophageal cancer (Father)        SOCIAL HISTORY:  Social History:      ALLERGIES:  penicillin (Anaphylaxis)      MEDICATIONS:  enalapril 5 milliGRAM(s) Oral daily  enoxaparin Injectable 80 milliGRAM(s) SubCutaneous every 12 hours  hydrochlorothiazide 12.5 milliGRAM(s) Oral daily  metoprolol tartrate 12.5 milliGRAM(s) Oral two times a day  pantoprazole    Tablet 40 milliGRAM(s) Oral before breakfast    PRN:      HOME MEDICATIONS:  Home Medications:  Dexilant 60 mg oral delayed release capsule: 1 cap(s) orally once a day (04 Jan 2021 06:37)  enalapril-hydrochlorothiazide 5 mg-12.5 mg oral tablet: 1 tab(s) orally once a day (04 Jan 2021 06:37)  senna oral tablet: 2 tab(s) orally once a day (at bedtime) (05 Jan 2021 09:37)      VITALS:   T(F): 96.4 (10-22 @ 05:42), Max: 98.8 (10-22 @ 00:25)  HR: 75 (10-22 @ 05:42) (75 - 135)  BP: 141/72 (10-22 @ 05:42) (123/61 - 180/96)  BP(mean): --  RR: 18 (10-22 @ 05:42) (17 - 18)  SpO2: 97% (10-22 @ 05:42) (96% - 97%)    I&O's Summary      REVIEW OF SYSTEMS:  CONSTITUTIONAL: No weakness, fevers or chills  HEENT: No visual changes, neck/ear pain  RESPIRATORY: No cough, sob  CARDIOVASCULAR: See HPI  GASTROINTESTINAL: No abdominal pain. No nausea, vomiting, diarrhea   GENITOURINARY: No dysuria, frequency or hematuria  NEUROLOGICAL: No new focal deficits  SKIN: No new rashes    PHYSICAL EXAM:  General: Not in distress.  Non-toxic appearing.   HEENT: EOMI  Cardio: regular, S1, S2, no murmur  Pulm: B/L BS.  No wheezing / crackles / rales  Abdomen: Soft, non-tender, non-distended. Normoactive bowel sounds  Extremities: No edema b/l le  Neuro: A&O x3. No focal deficits    LABS:                        11.1   6.10  )-----------( 212      ( 21 Oct 2022 19:19 )             34.7     10-21    141  |  103  |  22<H>  ----------------------------<  148<H>  3.9   |  27  |  0.9    Ca    9.6      21 Oct 2022 19:19  Mg     2.1     10-21    TPro  7.7  /  Alb  4.8  /  TBili  0.4  /  DBili  x   /  AST  29  /  ALT  20  /  AlkPhos  64  10-21      Troponin T, Serum: <0.01 ng/mL (10-21-22 @ 19:19)    CARDIAC MARKERS ( 21 Oct 2022 19:19 )  x     / <0.01 ng/mL / x     / x     / x          Serum Pro-Brain Natriuretic Peptide: 22 pg/mL (10-21-22 @ 19:19)      COVID-19 PCR: NotDetec (21 Oct 2022 19:19)      RADIOLOGY:  - CXR: no acute cardiopul disease      Outpt cardiologist: None    HPI:  67-year-old female with past medical history of HTN,  questionable irregular heartbeat was told this after she was seeing a cardiologist 10 to 15 years ago but was told she needed no medications, presents today for substernal chest pain described as pressure-like started approximately 40 minutes prior to arrival, constant, moderate intensity, radiating to her jaw, no alleviating or precipitating factors associated with palpitations and sob. At that time she was watching TV. no Family hx of pe/ dvt / slotting disorders.  No hx of tobacco use.   No fever, chills, n/v, pleuritic cp, diaphoresis, cough, ha/lh/dizziness, numbness/tingling, neck pain/ stiffness, abd pain, diarrhea, constipation, melena/brbpr, urinary symptoms, trauma, weakness, edema, calf pain/swelling/erythema, sick contacts, recent travel or rash.     In the ED: /96, , RR 18, T 97.8, 96% sat on RA. Initial EKG with T wave abnormalities in inferior leads and afib RVR. Repeat EKG w/out intervention NSR. D-dimer 212, trop 0.01, bnp 22.         PAST MEDICAL & SURGICAL HISTORY  Hypertension    GERD (gastroesophageal reflux disease)    Depression    PRISCA on CPAP    S/P cholecystectomy    S/P appendectomy    S/P ectopic pregnancy      FAMILY HISTORY:  Family history of esophageal cancer (Father)  No pertinent family history of premature CAD in first degree relatives    SOCIAL HISTORY: Denies EtOH, smoking or drug use      ALLERGIES:  penicillin (Anaphylaxis)      MEDICATIONS:  enalapril 5 milliGRAM(s) Oral daily  enoxaparin Injectable 80 milliGRAM(s) SubCutaneous every 12 hours  hydrochlorothiazide 12.5 milliGRAM(s) Oral daily  metoprolol tartrate 12.5 milliGRAM(s) Oral two times a day  pantoprazole    Tablet 40 milliGRAM(s) Oral before breakfast      Home Medications:  Dexilant 60 mg oral delayed release capsule: 1 cap(s) orally once a day (04 Jan 2021 06:37)  enalapril-hydrochlorothiazide 5 mg-12.5 mg oral tablet: 1 tab(s) orally once a day (04 Jan 2021 06:37)  senna oral tablet: 2 tab(s) orally once a day (at bedtime) (05 Jan 2021 09:37)      VITALS:   T(F): 96.4 (10-22 @ 05:42), Max: 98.8 (10-22 @ 00:25)  HR: 75 (10-22 @ 05:42) (75 - 135)  BP: 141/72 (10-22 @ 05:42) (123/61 - 180/96)  BP(mean): --  RR: 18 (10-22 @ 05:42) (17 - 18)  SpO2: 97% (10-22 @ 05:42) (96% - 97%)      REVIEW OF SYSTEMS:  CONSTITUTIONAL: No fever, weight loss, fatigue  NECK: No pain or stiffness  RESPIRATORY: See HPI  CARDIOVASCULAR: See HPI  GASTROINTESTINAL: No abdominal/epigastric pain, nausea, vomiting, hematemesis, diarrhea, constipation, melena or hematochezia  GENITOURINARY: No dysuria, frequency, hematuria, incontinence  NEUROLOGICAL: No headaches, memory loss, loss of strength, numbness, tremors  SKIN: No itching, burning, rashes, lesions   ENDOCRINE: No heat/cold intolerance or hair loss  MUSCULOSKELETAL: No joint pain or swelling  HEME/LYMPH: No easy bruising or bleeding gums    PHYSICAL EXAM:  General: Not in distress.  Non-toxic appearing.   HEENT: EOMI  Cardio: regular, S1, S2, no murmur  Pulm: B/L BS.  No wheezing / crackles / rales  Abdomen: Soft, non-tender, non-distended  Extremities: No edema b/l le  Neuro: A&O x3. No focal deficits      LABS:                        11.1   6.10  )-----------( 212      ( 21 Oct 2022 19:19 )             34.7     10-21    141  |  103  |  22<H>  ----------------------------<  148<H>  3.9   |  27  |  0.9    Ca    9.6      21 Oct 2022 19:19  Mg     2.1     10-21    TPro  7.7  /  Alb  4.8  /  TBili  0.4  /  DBili  x   /  AST  29  /  ALT  20  /  AlkPhos  64  10-21      Troponin T, Serum: <0.01 ng/mL (10-21-22 @ 19:19)    Serum Pro-Brain Natriuretic Peptide: 22 pg/mL (10-21-22 @ 19:19)    COVID-19 PCR: NotDetec (21 Oct 2022 19:19)      RADIOLOGY:  - CXR: no acute cardiopul disease

## 2022-10-23 PROCEDURE — 99223 1ST HOSP IP/OBS HIGH 75: CPT

## 2022-10-23 PROCEDURE — 99233 SBSQ HOSP IP/OBS HIGH 50: CPT

## 2022-10-23 RX ORDER — APIXABAN 2.5 MG/1
5 TABLET, FILM COATED ORAL EVERY 12 HOURS
Refills: 0 | Status: DISCONTINUED | OUTPATIENT
Start: 2022-10-23 | End: 2022-10-24

## 2022-10-23 RX ORDER — METOPROLOL TARTRATE 50 MG
25 TABLET ORAL ONCE
Refills: 0 | Status: DISCONTINUED | OUTPATIENT
Start: 2022-10-23 | End: 2022-10-24

## 2022-10-23 RX ADMIN — APIXABAN 5 MILLIGRAM(S): 2.5 TABLET, FILM COATED ORAL at 20:40

## 2022-10-23 RX ADMIN — Medication 12.5 MILLIGRAM(S): at 06:03

## 2022-10-23 RX ADMIN — PANTOPRAZOLE SODIUM 40 MILLIGRAM(S): 20 TABLET, DELAYED RELEASE ORAL at 06:03

## 2022-10-23 RX ADMIN — Medication 5 MILLIGRAM(S): at 06:03

## 2022-10-23 RX ADMIN — ENOXAPARIN SODIUM 80 MILLIGRAM(S): 100 INJECTION SUBCUTANEOUS at 06:02

## 2022-10-23 RX ADMIN — Medication 12.5 MILLIGRAM(S): at 18:35

## 2022-10-24 ENCOUNTER — TRANSCRIPTION ENCOUNTER (OUTPATIENT)
Age: 67
End: 2022-10-24

## 2022-10-24 VITALS
RESPIRATION RATE: 18 BRPM | HEART RATE: 59 BPM | SYSTOLIC BLOOD PRESSURE: 115 MMHG | TEMPERATURE: 97 F | DIASTOLIC BLOOD PRESSURE: 73 MMHG

## 2022-10-24 LAB
ALBUMIN SERPL ELPH-MCNC: 4.3 G/DL — SIGNIFICANT CHANGE UP (ref 3.5–5.2)
ALP SERPL-CCNC: 57 U/L — SIGNIFICANT CHANGE UP (ref 30–115)
ALT FLD-CCNC: 16 U/L — SIGNIFICANT CHANGE UP (ref 0–41)
ANION GAP SERPL CALC-SCNC: 10 MMOL/L — SIGNIFICANT CHANGE UP (ref 7–14)
AST SERPL-CCNC: 20 U/L — SIGNIFICANT CHANGE UP (ref 0–41)
BASOPHILS # BLD AUTO: 0.04 K/UL — SIGNIFICANT CHANGE UP (ref 0–0.2)
BASOPHILS NFR BLD AUTO: 0.8 % — SIGNIFICANT CHANGE UP (ref 0–1)
BILIRUB SERPL-MCNC: 0.3 MG/DL — SIGNIFICANT CHANGE UP (ref 0.2–1.2)
BUN SERPL-MCNC: 18 MG/DL — SIGNIFICANT CHANGE UP (ref 10–20)
CALCIUM SERPL-MCNC: 9.2 MG/DL — SIGNIFICANT CHANGE UP (ref 8.4–10.5)
CHLORIDE SERPL-SCNC: 103 MMOL/L — SIGNIFICANT CHANGE UP (ref 98–110)
CO2 SERPL-SCNC: 30 MMOL/L — SIGNIFICANT CHANGE UP (ref 17–32)
CREAT SERPL-MCNC: 0.7 MG/DL — SIGNIFICANT CHANGE UP (ref 0.7–1.5)
EGFR: 95 ML/MIN/1.73M2 — SIGNIFICANT CHANGE UP
EOSINOPHIL # BLD AUTO: 0.09 K/UL — SIGNIFICANT CHANGE UP (ref 0–0.7)
EOSINOPHIL NFR BLD AUTO: 1.8 % — SIGNIFICANT CHANGE UP (ref 0–8)
GLUCOSE SERPL-MCNC: 116 MG/DL — HIGH (ref 70–99)
HCT VFR BLD CALC: 34.2 % — LOW (ref 37–47)
HCV RNA FLD QL NAA+PROBE: SIGNIFICANT CHANGE UP
HCV RNA SPEC QL PROBE+SIG AMP: SIGNIFICANT CHANGE UP
HGB BLD-MCNC: 10.8 G/DL — LOW (ref 12–16)
IMM GRANULOCYTES NFR BLD AUTO: 0.2 % — SIGNIFICANT CHANGE UP (ref 0.1–0.3)
LYMPHOCYTES # BLD AUTO: 1.77 K/UL — SIGNIFICANT CHANGE UP (ref 1.2–3.4)
LYMPHOCYTES # BLD AUTO: 34.5 % — SIGNIFICANT CHANGE UP (ref 20.5–51.1)
MCHC RBC-ENTMCNC: 25.4 PG — LOW (ref 27–31)
MCHC RBC-ENTMCNC: 31.6 G/DL — LOW (ref 32–37)
MCV RBC AUTO: 80.3 FL — LOW (ref 81–99)
MONOCYTES # BLD AUTO: 0.37 K/UL — SIGNIFICANT CHANGE UP (ref 0.1–0.6)
MONOCYTES NFR BLD AUTO: 7.2 % — SIGNIFICANT CHANGE UP (ref 1.7–9.3)
NEUTROPHILS # BLD AUTO: 2.85 K/UL — SIGNIFICANT CHANGE UP (ref 1.4–6.5)
NEUTROPHILS NFR BLD AUTO: 55.5 % — SIGNIFICANT CHANGE UP (ref 42.2–75.2)
NRBC # BLD: 0 /100 WBCS — SIGNIFICANT CHANGE UP (ref 0–0)
PLATELET # BLD AUTO: 218 K/UL — SIGNIFICANT CHANGE UP (ref 130–400)
POTASSIUM SERPL-MCNC: 3.9 MMOL/L — SIGNIFICANT CHANGE UP (ref 3.5–5)
POTASSIUM SERPL-SCNC: 3.9 MMOL/L — SIGNIFICANT CHANGE UP (ref 3.5–5)
PROT SERPL-MCNC: 7 G/DL — SIGNIFICANT CHANGE UP (ref 6–8)
RBC # BLD: 4.26 M/UL — SIGNIFICANT CHANGE UP (ref 4.2–5.4)
RBC # FLD: 16.3 % — HIGH (ref 11.5–14.5)
SODIUM SERPL-SCNC: 143 MMOL/L — SIGNIFICANT CHANGE UP (ref 135–146)
WBC # BLD: 5.13 K/UL — SIGNIFICANT CHANGE UP (ref 4.8–10.8)
WBC # FLD AUTO: 5.13 K/UL — SIGNIFICANT CHANGE UP (ref 4.8–10.8)

## 2022-10-24 PROCEDURE — 99238 HOSP IP/OBS DSCHRG MGMT 30/<: CPT

## 2022-10-24 PROCEDURE — 75572 CT HRT W/3D IMAGE: CPT | Mod: 26

## 2022-10-24 RX ORDER — METOPROLOL TARTRATE 50 MG
1 TABLET ORAL
Qty: 30 | Refills: 2
Start: 2022-10-24 | End: 2023-01-21

## 2022-10-24 RX ORDER — APIXABAN 2.5 MG/1
1 TABLET, FILM COATED ORAL
Qty: 60 | Refills: 2
Start: 2022-10-24 | End: 2023-01-21

## 2022-10-24 RX ORDER — APIXABAN 2.5 MG/1
1 TABLET, FILM COATED ORAL
Qty: 60 | Refills: 0
Start: 2022-10-24 | End: 2022-11-22

## 2022-10-24 RX ADMIN — APIXABAN 5 MILLIGRAM(S): 2.5 TABLET, FILM COATED ORAL at 12:26

## 2022-10-24 RX ADMIN — Medication 12.5 MILLIGRAM(S): at 17:49

## 2022-10-24 RX ADMIN — Medication 12.5 MILLIGRAM(S): at 05:22

## 2022-10-24 RX ADMIN — Medication 5 MILLIGRAM(S): at 05:22

## 2022-10-24 RX ADMIN — PANTOPRAZOLE SODIUM 40 MILLIGRAM(S): 20 TABLET, DELAYED RELEASE ORAL at 06:06

## 2022-10-24 NOTE — PROGRESS NOTE ADULT - ASSESSMENT
66 yo F PMHx HTN, possible underlying arrhtamia (saw cardio 10 years ago but was told she doesn't need medications) presented for evaluation of substernal chest pain. Described as pressure-like started approximately 40 minutes prior to arrival, constant, moderate intensity, radiating to her jaw, no alleviating or precipitating factors associated with palpitations and sob. At that time she was watching TV. Pt found to have new onset afib    New Onset paroxysmal Afib   -symptomatic on presentation   -CHADSVASC2 score of 3  -TSH 4.4  -Echo unremarkable  - ccta done, pending read  -Tele monitoring   - rate controlled, c/w metoprolol  - pt tolerating lovenox, change to Eliquis  - CCTA      Diverticulosis   - stable  - GI eval appreciated    HTN   -c/w enalapril, HCTZ      #Progress Note Handoff  Pending (specify):CCTA, monitor Eliquis, anticipated for dc tomorrow  Family discussion: spoke with pt regarding CCTA, Eliquis. Pt nervous to go home as she lives a lone.   Disposition: home      
67-year-old female with past medical history of HTN,  questionable irregular heartbeat was told this after she was seeing a cardiologist 10 to 15 years ago but was told she needed no medications, presents for substernal chest pain described as pressure-like started approximately 40 minutes prior to arrival, constant, moderate intensity, radiating to her jaw, no alleviating or precipitating factors associated with palpitations and sob. At that time she was watching TV. no Family hx of pe/ dvt / slotting disorders.  No hx of tobacco use.   No fever, chills, n/v, pleuritic cp, diaphoresis, cough, ha/lh/dizziness, numbness/tingling, neck pain/ stiffness, abd pain, diarrhea, constipation, melena/brbpr, urinary symptoms, trauma, weakness, edema, calf pain/swelling/erythema, sick contacts, recent travel or rash.     In the ED: /96, , RR 18, T 97.8, 96% sat on RA. Initial EKG with T wave abnormalities in inferior leads and afib RVR. Repeat EKG w/out intervention NSR. D-dimer 212, trop 0.01, bnp 22.     Patient admitted for new onset PAF    #New Onset paroxysmal Afib   -symptomatic on presentation   -CHADSVASC2 score of 3  -TSH 4.40 elevated   - TTE 60-65%   -Tele monitoring   -cardio eval appreciated  - continue lopressor 12.5 mg BID  - started on lovenox, pt had hgb drop, recheck cbc, if stable, restart lovenox    - CCTA pending   - eliquis 5 mg BID on dc  - Discussed benefits and risks of starting anticoagulation to prevent strokes from Afib/Aflutter including risk of excessively bleeding gums or nose bleeds, hematuria,  hemorrhagic stroke, GI bleed,  excessive bleeding after trauma or cuts and even death. Advised seek medical intervention immediately.     #Diverticulosis   - pt states that she has 10 bouts of diverticular bleeding   - GI consult to clear for anticoagulation  - follow up cbc, HB 9.8 stable     #Hx of HTN   -c/w home enalapril 5 mg , and HCTZ 12.5 mg     #Progress Note Handoff  Pending (specify):  follow up GI, CCTA,  Family discussion: house staff updated pt family  Disposition: home    
68 yo F PMHx HTN, possible underlying arrythmia presented for evaluation of substernal chest pain. Described as pressure-like started approximately 40 minutes prior to arrival, constant, moderate intensity, radiating to her jaw, no alleviating or precipitating factors associated with palpitations and sob. Pt found to have new onset afib    # New Onset paroxysmal Afib   -symptomatic on presentation   -CHADSVASC2 score of 3  -TSH 4.4, FU fT3 and fT4   -Echo unremarkable  - ccta done, pending read  - Tele monitoring, currently NSR (59 - 74)  - rate controlled, c/w metoprolol  - cw eliquis 5mg PO BID  - FU CCTA read   - EP rec continuing with eliquis, metoprolol, and OP FU       # Diverticulosis   - stable  -  GI recs     # HTN   - BP controlled  (105/62 - 121/69)  -c/w enalapril, HCTZ
67-year-old female with past medical history of HTN,  questionable irregular heartbeat was told this after she was seeing a cardiologist 10 to 15 years ago but was told she needed no medications, presents today for substernal chest pain described as pressure-like started approximately 40 minutes prior to arrival, constant, moderate intensity, radiating to her jaw, no alleviating or precipitating factors associated with palpitations and sob. At that time she was watching TV. no Family hx of pe/ dvt / slotting disorders.  No hx of tobacco use.   No fever, chills, n/v, pleuritic cp, diaphoresis, cough, ha/lh/dizziness, numbness/tingling, neck pain/ stiffness, abd pain, diarrhea, constipation, melena/brbpr, urinary symptoms, trauma, weakness, edema, calf pain/swelling/erythema, sick contacts, recent travel or rash.     In the ED: /96, , RR 18, T 97.8, 96% sat on RA. Initial EKG with T wave abnormalities in inferior leads and afib RVR. Repeat EKG w/out intervention NSR. D-dimer 212, trop 0.01, bnp 22.     Patient admitted for new onset PAF      #New Onset paroxysmal Afib   -symptomatic on presentation   -CHADSVASC2 score of 3  -f/u TSH   -F/u echo  -Tele monitoring   -cardio eval appreciated  - continue lopressor 12.5 mg BID  - started on lovenox, pt had hgb drop, recheck cbc, if stable, restart lovenox    - CCTA  - eliquis 5 mg BID on dc  - Discussed benefits and risks of starting anticoagulation to prevent strokes from Afib/Aflutter including risk of excessively bleeding gums or nose bleeds, hematuria,  hemorrhagic stroke, GI bleed,  excessive bleeding after trauma or cuts and even death. Advised seek medical intervention immediately.           #Diverticulosis   - pt states that she has 10 bouts of diverticular bleeding   - GI consult to clear for anticoagulantion  - follow up cbc     #Hx of HTN   -c/w home enalapril 5 mg , and HCTZ 12.5 mg       #Progress Note Handoff  Pending (specify):  follow up GI, CCTA, echo  Family discussion: house staff updated pt family  Disposition: home  Anticipated date: 10/23-10/24

## 2022-10-24 NOTE — DISCHARGE NOTE PROVIDER - NSDCFUADDAPPT_GEN_ALL_CORE_FT
Please follow up with your primary care physician within two weeks after discharge to update them on your recent hospitalization and to review any changes in your medications.  Please follow up with Dr. Rodriguez the heart doctor in 1 week  Please followup with Dr. Porter the gastroenterology doctor for your diverticulosis in 2 weeks  Please follow up with your primary care physician within two weeks after discharge to update them on your recent hospitalization and to review any changes in your medications.  Please follow up with Dr. Rodriguez the heart doctor in 1 week  Please followup with  gastroenterology doctor for your diverticulosis in 2 weeks

## 2022-10-24 NOTE — DISCHARGE NOTE PROVIDER - NSDCCPCAREPLAN_GEN_ALL_CORE_FT
PRINCIPAL DISCHARGE DIAGNOSIS  Diagnosis: New onset atrial fibrillation  Assessment and Plan of Treatment: you came to the hospital for chest pain, palpitation and shortness of breath. It turned out that you have an abnormal heart rhythm called atrial fibrillation. With this condition, your heart’s two upper chambers quiver rather than squeeze the blood out in a normal pattern. This leads to an irregular and sometimes rapid heartbeat. Some people will develop associated symptoms such as a flip-flopping heartbeat, chest pain, lightheadedness, or shortness of breath. Other people may have no symptoms at all. Atrial fibrillation is serious because it affects the heart’s ability to fill with blood as it should. Blood clots may form. This increases the risk for stroke. This complication can be prevented if you use blood thinning medications. Please take your Eliquis 5 mg orally twice a day as prescribed. Please taky your metoprolol 25 mg orally once a day.   Limit your intake of coffee, tea, cola, and other beverages with caffeine. Talk with your doctor about whether you should eliminate caffeine. Avoid over-the-counter medicines that have caffeine in them. Never take stimulants such as amphetamines or cocaine. These drugs can speed up your heart rate and trigger atrial fibrillation.  Call your healthcare provider right away if you have any of the following:  Weakness  Dizziness  Fainting  Fatigue  Shortness of breath  Chest pain with increased activity  A change in the usual regularity of your heartbeat, or an unusually fast heartbeat         PRINCIPAL DISCHARGE DIAGNOSIS  Diagnosis: New onset atrial fibrillation  Assessment and Plan of Treatment: you came to the hospital for chest pain, palpitation and shortness of breath. It turned out that you have an abnormal heart rhythm called atrial fibrillation. With this condition, your heart’s two upper chambers quiver rather than squeeze the blood out in a normal pattern. This leads to an irregular and sometimes rapid heartbeat. Some people will develop associated symptoms such as a flip-flopping heartbeat, chest pain, lightheadedness, or shortness of breath. Other people may have no symptoms at all. Atrial fibrillation is serious because it affects the heart’s ability to fill with blood as it should. Blood clots may form. This increases the risk for stroke. This complication can be prevented if you use blood thinning medications. Please take your Eliquis 5 mg orally twice a day as prescribed. Please taky your metoprolol 25 mg orally once a day.   Limit your intake of coffee, tea, cola, and other beverages with caffeine. Talk with your doctor about whether you should eliminate caffeine. Avoid over-the-counter medicines that have caffeine in them. Never take stimulants such as amphetamines or cocaine. These drugs can speed up your heart rate and trigger atrial fibrillation.  Call your healthcare provider right away if you have any of the following:  Weakness  Dizziness  Fainting  Fatigue  Shortness of breath  Chest pain with increased activity  A change in the usual regularity of your heartbeat, or an unusually fast heartbeat  Follow up with electrophysiology for a Watchman procedure  Follow up with GI doctor to ensure you are not bleeding

## 2022-10-24 NOTE — DISCHARGE NOTE NURSING/CASE MANAGEMENT/SOCIAL WORK - NSDCFUADDAPPT_GEN_ALL_CORE_FT
Please follow up with your primary care physician within two weeks after discharge to update them on your recent hospitalization and to review any changes in your medications.  Please follow up with Dr. Rodriguez the heart doctor in 1 week  Please followup with  gastroenterology doctor for your diverticulosis in 2 weeks

## 2022-10-24 NOTE — DISCHARGE NOTE PROVIDER - CARE PROVIDER_API CALL
Grabiel Rodriguez)  Cardiac Electrophysiology; Cardiology; Internal Medicine  [address] [place] [address]  Phone: [phone]  Fax: [phone]  Follow Up Time: 1 week    Gurpreet Porter)  Gastroenterology; Internal Medicine  [address] [place] [address]  Phone: [phone]  Fax: [phone]  Follow Up Time: 2 weeks    Viktor Ledbetter)  Internal Medicine  [address] [facility] - Room [address] [place] [address]  Phone: [phone]  Fax: [phone]  Follow Up Time: 2 weeks   Grabiel Rodriguez)  Cardiac Electrophysiology; Cardiology; Internal Medicine  [address] [place] [address]  Phone: [phone]  Fax: [phone]  Follow Up Time: 1 week    Viktor Ledbetter ()  Internal Medicine  [address] [facility] - Room [address] [place] [address]  Phone: [phone]  Fax: [phone]  Follow Up Time: 2 weeks    Angela Colbert)  Gastroenterology  [address] [place] [address]  Phone: [phone]  Fax: [phone]  Follow Up Time:

## 2022-10-24 NOTE — CONSULT NOTE ADULT - ATTENDING COMMENTS
Eliquis   GI f-up: Patient prefers female GI- Dr. Colbert.  Will benefit from Amulet device considering recurrent bleeding, severe anemia  No plan for DCCV/Ablation considering GI bleeding and need for OAC after these therapies  BB  TSH  OP f-up  D/w Daughter who lives in [place]

## 2022-10-24 NOTE — DISCHARGE NOTE PROVIDER - PROVIDER TOKENS
PROVIDER:[TOKEN:[[id number]],FOLLOWUP:[1 week]],PROVIDER:[TOKEN:[[id number]],FOLLOWUP:[2 weeks]],PROVIDER:[TOKEN:[[id number]],FOLLOWUP:[2 weeks]] PROVIDER:[TOKEN:[[id number]],FOLLOWUP:[1 week]],PROVIDER:[TOKEN:[[id number]],FOLLOWUP:[2 weeks]],PROVIDER:[TOKEN:[[id number]]]

## 2022-10-24 NOTE — DISCHARGE NOTE NURSING/CASE MANAGEMENT/SOCIAL WORK - PATIENT PORTAL LINK FT
You can access the FollowMyHealth Patient Portal offered by [facility] by registering at the following website: [email or web address]. By joining [employer]’s FollowMyHealth portal, you will also be able to view your health information using other applications (apps) compatible with our system.

## 2022-10-24 NOTE — CONSULT NOTE ADULT - ASSESSMENT
Paroxysmal Afib; currently in NSR  [id number]: 3  Echo noted: No significant valvular disease    - continue with low-dose BB  - Eliquis 5mg q12hr for anticoagulation  - Outpt follow up for possible ablation  - TSH slightly elevated; get free T4 Paroxysmal Afib; currently in NSR  [id number]: 3  Echo noted: No significant valvular disease    - continue with low-dose BB  - Pt reports a history of multiple GI Bleeds in the past while on Aspirin. Will discuss MARYJANE closure device options with her  - Outpt follow up for possible ablation  - TSH slightly elevated; get free T4 Paroxysmal Afib; currently in NSR  [id number]: 3  Echo noted: EF wnl o significant valvular disease     Pt reports a history of multiple GI Bleeds in the past while on Aspirin. She reports multiple episodes per month, the last was a few days before admission. She never had a bleed necessitating hospital admission, or blood transfusion    - Increase BB to 25mg q12hr.  - Start eliquis 5mg q12hr. Pt advised to call Dr. Rodriguez and her Gastroenterologist with recurrence/significant bleed  - Outpt follow up with Dr. rodriguez in 2 weeks to evaluate for MARYJANE closure device and possible ablation  - TSH slightly elevated; get free T4

## 2022-10-24 NOTE — DISCHARGE NOTE PROVIDER - NSDCMRMEDTOKEN_GEN_ALL_CORE_FT
apixaban 5 mg oral tablet: 1 tab(s) orally every 12 hours  Dexilant 60 mg oral delayed release capsule: 1 cap(s) orally once a day  enalapril-hydrochlorothiazide 5 mg-12.5 mg oral tablet: 1 tab(s) orally once a day  metoprolol succinate 25 mg oral tablet, extended release: 1 tab(s) orally once a day   senna oral tablet: 2 tab(s) orally once a day (at bedtime)

## 2022-10-24 NOTE — DISCHARGE NOTE NURSING/CASE MANAGEMENT/SOCIAL WORK - NSDCPEFALRISK_GEN_ALL_CORE
For information on Fall & Injury Prevention, visit: [email or web address] OR  [email or web address] OR  https://www.cdc.gov/steadi/patient.html

## 2022-10-24 NOTE — PROGRESS NOTE ADULT - SUBJECTIVE AND OBJECTIVE BOX
Pt seen and examined at bedside. No CP or SOB. Pt states that she had diverticulosis and that she has about 10 bleeds this last month.  Pt states that her last colonoscopy was 2 yrs ago.           PAST MEDICAL & SURGICAL HISTORY:  Hypertension    GERD (gastroesophageal reflux disease)    Depression    PRISCA on CPAP    S/P cholecystectomy    S/P appendectomy    S/P ectopic pregnancy        VITAL SIGNS (Last 24 hrs):  T(C): 36.1 (10-22-22 @ 07:33), Max: 37.1 (10-22-22 @ 00:25)  HR: 59 (10-22-22 @ 07:33) (59 - 135)  BP: 126/58 (10-22-22 @ 07:33) (123/61 - 180/96)  RR: 18 (10-22-22 @ 07:33) (17 - 18)  SpO2: 95% (10-22-22 @ 07:33) (95% - 97%)  Wt(kg): --  Daily Height in cm: 160.02 (21 Oct 2022 18:57)    Daily     I&O's Summary      PHYSICAL EXAM:  GENERAL: NAD, well-developed  HEAD:  Atraumatic, Normocephalic  EYES: EOMI, PERRLA, conjunctiva and sclera clear  NECK: Supple, No JVD  CHEST/LUNG: Clear to auscultation bilaterally; No wheeze  HEART: Regular rate and rhythm; No murmurs, rubs, or gallops  ABDOMEN: Soft, Nontender, Nondistended; Bowel sounds present  EXTREMITIES:  2+ Peripheral Pulses, No clubbing, cyanosis, or edema  PSYCH: AAOx3  NEUROLOGY: non-focal  SKIN: No rashes or lesions    Labs Reviewed  Spoke to patient in regards to abnormal labs.    CBC Full  -  ( 22 Oct 2022 07:03 )  WBC Count : 5.18 K/uL  Hemoglobin : 9.3 g/dL  Hematocrit : 29.3 %  Platelet Count - Automated : 194 K/uL  Mean Cell Volume : 80.5 fL  Mean Cell Hemoglobin : 25.5 pg  Mean Cell Hemoglobin Concentration : 31.7 g/dL  Auto Neutrophil # : 2.06 K/uL  Auto Lymphocyte # : 2.54 K/uL  Auto Monocyte # : 0.34 K/uL  Auto Eosinophil # : 0.19 K/uL  Auto Basophil # : 0.04 K/uL  Auto Neutrophil % : 39.7 %  Auto Lymphocyte % : 49.0 %  Auto Monocyte % : 6.6 %  Auto Eosinophil % : 3.7 %  Auto Basophil % : 0.8 %    BMP:    10-22 @ 07:03    Blood Urea Nitrogen - 21  Calcium - 8.8  Carbond Dioxide - 28  Chloride - 107  Creatinine - 0.7  Glucose - 98  Potassium - 3.9  Sodium - 145      Hemoglobin A1c -     Urine Culture:        COVID Labs  CRP:      D-Dimer:  212 ng/mL DDU (10-21-22 @ 20:00)            Imaging reviewed independently and reviewed read  < from: Xray Chest 1 View-PORTABLE IMMEDIATE (10.21.22 @ 20:30) >  Impression:    No radiographic evidence of acutecardiopulmonary disease.    < end of copied text >      < from: 12 Lead ECG (10.21.22 @ 20:34) >    Diagnosis Line Normal sinus rhythm  Normal ECG    < end of copied text >      MEDICATIONS  (STANDING):  enalapril 5 milliGRAM(s) Oral daily  hydrochlorothiazide 12.5 milliGRAM(s) Oral daily  metoprolol tartrate 12.5 milliGRAM(s) Oral two times a day  pantoprazole    Tablet 40 milliGRAM(s) Oral before breakfast    MEDICATIONS  (PRN):      
SUBJECTIVE / OVERNIGHT EVENTS  Patient slept well overnight. No acute complaints this AM. Patient does not report fevers, chills, CP, SOB, or n/v/d    MEDICATIONS  apixaban 5 milliGRAM(s) Oral every 12 hours  enalapril 5 milliGRAM(s) Oral daily  hydrochlorothiazide 12.5 milliGRAM(s) Oral daily  metoprolol tartrate 12.5 milliGRAM(s) Oral two times a day  pantoprazole    Tablet 40 milliGRAM(s) Oral before breakfast    metoprolol tartrate 25 milliGRAM(s) Oral once PRN If HR>65 Before CCTA    VITALS /  EXAM    T(C): 36 (10-24-22 @ 13:35), Max: 36.3 (10-23-22 @ 14:26)  HR: 59 (10-24-22 @ 13:35) (59 - 74)  BP: 115/73 (10-24-22 @ 13:35) (105/62 - 121/69)  RR: 18 (10-24-22 @ 13:35) (18 - 18)  SpO2: 95% (10-23-22 @ 20:22) (95% - 95%)    GENERAL: NAD, well-developed  CHEST/LUNG: Clear to auscultation bilaterally; No wheezes, rales or rhonchi  HEART: Regular rate and rhythm; No murmurs, rubs, or gallops  ABDOMEN: Soft, Nontender, Nondistended; Bowel sounds present, no masses.  EXTREMITIES:  2+ Peripheral Pulses, No clubbing, cyanosis, or edema    I's & O's     10-23-22 @ 07:01  -  10-24-22 @ 07:00  --------------------------------------------------------  IN:    Oral Fluid: 1150 mL  Total IN: 1150 mL    OUT:    Voided (mL): 500 mL  Total OUT: 500 mL    Total NET: 650 mL        LABS             9.8    5.71  )-----------( 199      ( 10-22-22 @ 20:44 )             30.8             Troponin T, Serum: <0.01 ng/mL (10-22-22 @ 07:03)  Troponin T, Serum: <0.01 ng/mL (10-21-22 @ 19:19)          Serum Pro-Brain Natriuretic Peptide: 22 pg/mL (10-21-22 @ 19:19)      Blood Gas Venous - Lactate: 2.20 mmol/L (10-21-22 @ 20:08)      MICRO / IMAGING / CARDIOLOGY  Telemetry: Reviewed   EKG: Reviewed    CULTURES    IMAGING  PACS Image:  (10-24-22 @ 11:15)    CARDIOLOGY  
SUBJECTIVE / OVERNIGHT EVENTS  Patient slept well overnight. No acute complaints this AM. Patient does not report fevers, chills, CP, SOB, or n/v/d    MEDICATIONS  enalapril 5 milliGRAM(s) Oral daily  enoxaparin Injectable 80 milliGRAM(s) SubCutaneous every 12 hours  hydrochlorothiazide 12.5 milliGRAM(s) Oral daily  metoprolol tartrate 12.5 milliGRAM(s) Oral two times a day  pantoprazole    Tablet 40 milliGRAM(s) Oral before breakfast      VITALS /  EXAM    T(C): 35.9 (10-22-22 @ 21:41), Max: 37.1 (10-22-22 @ 00:25)  HR: 69 (10-22-22 @ 21:41) (59 - 76)  BP: 128/57 (10-22-22 @ 21:41) (123/61 - 147/72)  RR: 18 (10-22-22 @ 18:03) (18 - 18)  SpO2: 96% (10-22-22 @ 21:41) (95% - 97%)    GENERAL: NAD, well-developed  CHEST/LUNG: Clear to auscultation bilaterally; No wheezes, rales or rhonchi  HEART: Regular rate and rhythm; No murmurs, rubs, or gallops  ABDOMEN: Soft, Nontender, Nondistended; Bowel sounds present, no masses.  EXTREMITIES:  2+ Peripheral Pulses, No clubbing, cyanosis, or edema    I's & O's     LABS             9.8    5.71  )-----------( 199      ( 10-22-22 @ 20:44 )             30.8     145  |  107  |  21  -------------------------<  98   10-22-22 @ 07:03  3.9  |  28  |  0.7    Ca      8.8     10-22-22 @ 07:03  Mg     2.0     10-22-22 @ 07:03    TPro  6.0  /  Alb  3.6  /  TBili  0.3  /  DBili  x   /  AST  18  /  ALT  14  /  AlkPhos  53  /  GGT  x     10-22-22 @ 07:03      Troponin T, Serum: <0.01 ng/mL (10-22-22 @ 07:03)  Troponin T, Serum: <0.01 ng/mL (10-21-22 @ 19:19)          Serum Pro-Brain Natriuretic Peptide: 22 pg/mL (10-21-22 @ 19:19)      Blood Gas Venous - Lactate: 2.20 mmol/L (10-21-22 @ 20:08)      MICRO / IMAGING / CARDIOLOGY  Telemetry: Reviewed   EKG: Reviewed    CULTURES    IMAGING  PACS Image:  (10-21-22 @ 20:30)    CARDIOLOGY  
op findings discussed  ot very pleased and grateful w results  vss  alert and awake  left breast wound and flaps 100% viable, clean and dry. CONRAD w scant serosang discharge, pulled by me. suture removal in about 7-10 days.  left hand and forearm wounds w minimal remaining fibrinous tissue, w slow but excellent granulation tissue and minimal ROM of fingers present.  plan: for VAC placement on the left hand and forearm soon, ordered.  pt understands probable permanancy of total dysfunction of left hand and insensate and possible need for future debridements and/or major am[utation, phys therapy, etc.  discussed w Dr Lou and Stu.
CHIEF COMPLAINT:    Patient is a 67y old  Female who presents with a chief complaint of CHEST PAIN     INTERVAL HPI/OVERNIGHT EVENTS:    Patient seen and examined at bedside. No acute overnight events occurred.    ROS: Denies palpitations. All other systems are negative.    Medications:  Standing  enalapril 5 milliGRAM(s) Oral daily  enoxaparin Injectable 80 milliGRAM(s) SubCutaneous every 12 hours  hydrochlorothiazide 12.5 milliGRAM(s) Oral daily  metoprolol tartrate 12.5 milliGRAM(s) Oral two times a day  pantoprazole    Tablet 40 milliGRAM(s) Oral before breakfast    PRN Meds  metoprolol tartrate 25 milliGRAM(s) Oral once PRN        Vital Signs:    T(F): 97.1 (10-23-22 @ 04:41), Max: 97.1 (10-23-22 @ 04:41)  HR: 63 (10-23-22 @ 04:41) (63 - 75)  BP: 137/68 (10-23-22 @ 04:41) (128/57 - 147/72)  RR: 18 (10-23-22 @ 04:41) (18 - 18)  SpO2: 96% (10-22-22 @ 21:41) (96% - 96%)  I&O's Summary    23 Oct 2022 07:01  -  23 Oct 2022 13:11  --------------------------------------------------------  IN: 330 mL / OUT: 0 mL / NET: 330 mL      Daily Height in cm: 160.02 (22 Oct 2022 21:41)    Daily Weight in k.4 (23 Oct 2022 04:41)  CAPILLARY BLOOD GLUCOSE          PHYSICAL EXAM:  GENERAL:  NAD  SKIN: No rashes or lesions  HEENT: Atraumatic. Normocephalic. Anicteric  NECK:  No JVD.   PULMONARY: Clear to ausculation bilaterally. No wheezing. No rales  CVS: Normal S1, S2. Regular rate and rhythm. No murmurs.  ABDOMEN/GI: Soft, Nontender, Nondistended; Bowel sounds are present  EXTREMITIES:  No edema B/L LE.  NEUROLOGIC:  No motor deficit.  PSYCH: Alert & oriented x 3, normal affect      LABS:                        9.8    5.71  )-----------( 199      ( 22 Oct 2022 20:44 )             30.8     10-22    145  |  107  |  21<H>  ----------------------------<  98  3.9   |  28  |  0.7    Ca    8.8      22 Oct 2022 07:03  Mg     2.0     10-22    TPro  6.0  /  Alb  3.6  /  TBili  0.3  /  DBili  x   /  AST  18  /  ALT  14  /  AlkPhos  53  10-22      Serum Pro-Brain Natriuretic Peptide: 22 pg/mL (10-21-22 @ 19:19)    Trop <0.01, CKMB --, CK --, 10-22-22 @ 07:03  Trop <0.01, CKMB --, CK --, 10-21-22 @ 19:19        RADIOLOGY & ADDITIONAL TESTS:  Imaging or report Personally Reviewed:  [ ] YES  [ ] NO -->no new images    Telemetry reviewed independently - NSR, no acute events  EKG reviewed independently -->no new EKGs    Consultant(s) Notes Reviewed:  [ ] YES  [ ] NO  Care Discussed with Consultants/Other Providers [ ] YES  [ ] NO    Case discussed with resident  Care discussed with pt

## 2022-10-24 NOTE — CONSULT NOTE ADULT - SUBJECTIVE AND OBJECTIVE BOX
Outpt cardiologist:    HPI:  67-year-old female with past medical history of HTN,  questionable irregular heartbeat was told this after she was seeing a cardiologist 10 to 15 years ago but was told she needed no medications, presents today for substernal chest pain described as pressure-like started approximately 40 minutes prior to arrival, constant, moderate intensity, radiating to her jaw, no alleviating or precipitating factors associated with palpitations and sob. At that time she was watching TV. no Family hx of pe/ dvt / slotting disorders.  No hx of tobacco use.   No fever, chills, n/v, pleuritic cp, diaphoresis, cough, ha/lh/dizziness, numbness/tingling, neck pain/ stiffness, abd pain, diarrhea, constipation, melena/brbpr, urinary symptoms, trauma, weakness, edema, calf pain/swelling/erythema, sick contacts, recent travel or rash.     In the ED: /96, , RR 18, T 97.8, 96% sat on RA. Initial EKG with T wave abnormalities in inferior leads and afib RVR. Repeat EKG w/out intervention NSR. D-dimer 212, trop 0.01, bnp 22.      (21 Oct 2022 23:19)            PAST MEDICAL & SURGICAL HISTORY  Hypertension    GERD (gastroesophageal reflux disease)    Depression    PRISCA on CPAP    S/P cholecystectomy    S/P appendectomy    S/P ectopic pregnancy        FAMILY HISTORY:  FAMILY HISTORY:  Family history of esophageal cancer (Father)        SOCIAL HISTORY:  Social History:      ALLERGIES:  penicillin (Anaphylaxis)      MEDICATIONS:  apixaban 5 milliGRAM(s) Oral every 12 hours  enalapril 5 milliGRAM(s) Oral daily  hydrochlorothiazide 12.5 milliGRAM(s) Oral daily  metoprolol tartrate 12.5 milliGRAM(s) Oral two times a day  pantoprazole    Tablet 40 milliGRAM(s) Oral before breakfast    PRN:  metoprolol tartrate 25 milliGRAM(s) Oral once PRN      HOME MEDICATIONS:  Home Medications:  Dexilant 60 mg oral delayed release capsule: 1 cap(s) orally once a day (04 Jan 2021 06:37)  enalapril-hydrochlorothiazide 5 mg-12.5 mg oral tablet: 1 tab(s) orally once a day (04 Jan 2021 06:37)  senna oral tablet: 2 tab(s) orally once a day (at bedtime) (05 Jan 2021 09:37)      VITALS:   T(F): 96.1 (10-24 @ 05:19), Max: 98.8 (10-22 @ 00:25)  HR: 63 (10-24 @ 05:19) (59 - 135)  BP: 111/55 (10-24 @ 05:19) (105/62 - 180/96)  BP(mean): 82 (10-22 @ 07:33) (82 - 82)  RR: 18 (10-23 @ 20:15) (17 - 18)  SpO2: 95% (10-23 @ 20:22) (95% - 97%)    I&O's Summary    23 Oct 2022 07:01  -  24 Oct 2022 07:00  --------------------------------------------------------  IN: 1150 mL / OUT: 500 mL / NET: 650 mL        REVIEW OF SYSTEMS:  CONSTITUTIONAL: No weakness, fevers or chills  HEENT: No visual changes, neck/ear pain  RESPIRATORY: No cough, sob  CARDIOVASCULAR: See HPI  GASTROINTESTINAL: No abdominal pain. No nausea, vomiting, diarrhea   GENITOURINARY: No dysuria, frequency or hematuria  NEUROLOGICAL: No new focal deficits  SKIN: No new rashes    PHYSICAL EXAM:  General: Not in distress.  Non-toxic appearing.   HEENT: EOMI  Cardio: regular, S1, S2, no murmur  Pulm: B/L BS.  No wheezing / crackles / rales  Abdomen: Soft, non-tender, non-distended. Normoactive bowel sounds  Extremities: No edema b/l le  Neuro: A&O x3. No focal deficits    LABS:                        9.8    5.71  )-----------( 199      ( 22 Oct 2022 20:44 )             30.8                     Troponin trend:      10-22 Chol 186 LDL -- HDL 62 Trig 61  COVID-19 PCR: NotDetec (21 Oct 2022 19:19)      RADIOLOGY:  -CXR:  -TTE:  < from: TTE Echo Complete w/o Contrast w/ Doppler (10.22.22 @ 10:52) >  Summary:   1. Left ventricular ejection fraction, by visual estimation, is 60 to   65%.   2. Normal left ventricular size and wall thicknesses, with normal   systolic and diastolic function.    < end of copied text >      -OTHER:  < from: CT Angio Chest PE Protocol w/ IV Cont (01.28.21 @ 01:48) >  IMPRESSION:    1.  No evidence of central or segmental pulmonary emboli.    2.  No CT evidence of acute intrathoracic pathology.    < end of copied text >    ECG: NSR at 88bpm      TELEMETRY EVENTS: currently off tele

## 2022-10-24 NOTE — DISCHARGE NOTE PROVIDER - NSDCPNSUBOBJ_GEN_ALL_CORE
Pt seen and examiend at bedside. 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 Case d/w cardiology. C/w betablocker, no further work up. Pt aware of bridging.

## 2022-10-24 NOTE — DISCHARGE NOTE PROVIDER - CARE PROVIDERS DIRECT ADDRESSES
,DirectAddress_Unknown,DirectAddress_Unknown,DirectAddress_Unknown ,DirectAddress_Unknown,DirectAddress_Unknown,[email or web address]

## 2022-10-24 NOTE — DISCHARGE NOTE PROVIDER - HOSPITAL COURSE
HPI:  67-year-old female with past medical history of HTN,  questionable irregular heartbeat was told this after she was seeing a cardiologist 10 to 15 years ago but was told she needed no medications, presents for substernal chest pain described as pressure-like started approximately 40 minutes prior to arrival, constant, moderate intensity, radiating to her jaw, no alleviating or precipitating factors associated with palpitations and sob. At that time she was watching TV. no Family hx of pe/ dvt / slotting disorders.  No hx of tobacco use.   No fever, chills, n/v, pleuritic cp, diaphoresis, cough, ha/lh/dizziness, numbness/tingling, neck pain/ stiffness, abd pain, diarrhea, constipation, melena/brbpr, urinary symptoms, trauma, weakness, edema, calf pain/swelling/erythema, sick contacts, recent travel or rash.   In the ED: /96, , RR 18, T 97.8, 96% sat on RA. Initial EKG with T wave abnormalities in inferior leads and afib RVR. Repeat EKG w/out intervention NSR. D-dimer 212, trop 0.01, bnp 22.     # New Onset paroxysmal Afib   -symptomatic on presentation   -CHADSVASC2 score of 3  -TSH 4.4, FU fT3 and fT4   -Echo unremarkable  - ccta done  - Tele monitoring, currently NSR (59 - 74)  - rate controlled, given metoprolol  - given eliquis 5mg PO BID  - CCTA done  - EP rec continuing with eliquis, metoprolol, and OP FU       # Diverticulosis   - stable  - FU OP GI    # HTN   - BP controlled  (105/62 - 121/69)  - given enalapril, HCTZ

## 2022-10-25 LAB
T3FREE SERPL-MCNC: 2.62 PG/ML — SIGNIFICANT CHANGE UP (ref 2–4.4)
T4 FREE SERPL-MCNC: 1.1 NG/DL — SIGNIFICANT CHANGE UP (ref 0.9–1.8)

## 2022-11-02 DIAGNOSIS — G47.33 OBSTRUCTIVE SLEEP APNEA (ADULT) (PEDIATRIC): ICD-10-CM

## 2022-11-02 DIAGNOSIS — I48.0 PAROXYSMAL ATRIAL FIBRILLATION: ICD-10-CM

## 2022-11-02 DIAGNOSIS — Z88.0 ALLERGY STATUS TO PENICILLIN: ICD-10-CM

## 2022-11-02 DIAGNOSIS — Z20.822 CONTACT WITH AND (SUSPECTED) EXPOSURE TO COVID-19: ICD-10-CM

## 2022-11-02 DIAGNOSIS — I10 ESSENTIAL (PRIMARY) HYPERTENSION: ICD-10-CM

## 2022-11-02 DIAGNOSIS — Z90.49 ACQUIRED ABSENCE OF OTHER SPECIFIED PARTS OF DIGESTIVE TRACT: ICD-10-CM

## 2022-11-02 DIAGNOSIS — K57.90 DIVERTICULOSIS OF INTESTINE, PART UNSPECIFIED, WITHOUT PERFORATION OR ABSCESS WITHOUT BLEEDING: ICD-10-CM

## 2022-11-02 DIAGNOSIS — Z79.82 LONG TERM (CURRENT) USE OF ASPIRIN: ICD-10-CM

## 2022-11-02 DIAGNOSIS — K21.9 GASTRO-ESOPHAGEAL REFLUX DISEASE WITHOUT ESOPHAGITIS: ICD-10-CM

## 2022-11-29 ENCOUNTER — OUTPATIENT (OUTPATIENT)
Dept: OUTPATIENT SERVICES | Facility: HOSPITAL | Age: 67
LOS: 1 days | Discharge: HOME | End: 2022-11-29

## 2022-11-29 VITALS
WEIGHT: 184.97 LBS | SYSTOLIC BLOOD PRESSURE: 156 MMHG | HEART RATE: 74 BPM | HEIGHT: 63 IN | RESPIRATION RATE: 14 BRPM | DIASTOLIC BLOOD PRESSURE: 85 MMHG | OXYGEN SATURATION: 97 % | TEMPERATURE: 98 F

## 2022-11-29 DIAGNOSIS — I48.0 PAROXYSMAL ATRIAL FIBRILLATION: ICD-10-CM

## 2022-11-29 DIAGNOSIS — Z01.818 ENCOUNTER FOR OTHER PREPROCEDURAL EXAMINATION: ICD-10-CM

## 2022-11-29 DIAGNOSIS — Z90.49 ACQUIRED ABSENCE OF OTHER SPECIFIED PARTS OF DIGESTIVE TRACT: Chronic | ICD-10-CM

## 2022-11-29 DIAGNOSIS — Z87.59 PERSONAL HISTORY OF OTHER COMPLICATIONS OF PREGNANCY, CHILDBIRTH AND THE PUERPERIUM: Chronic | ICD-10-CM

## 2022-11-29 LAB
ALBUMIN SERPL ELPH-MCNC: 4.3 G/DL — SIGNIFICANT CHANGE UP (ref 3.5–5.2)
ALP SERPL-CCNC: 63 U/L — SIGNIFICANT CHANGE UP (ref 30–115)
ALT FLD-CCNC: 13 U/L — SIGNIFICANT CHANGE UP (ref 0–41)
ANION GAP SERPL CALC-SCNC: 12 MMOL/L — SIGNIFICANT CHANGE UP (ref 7–14)
APPEARANCE UR: CLEAR — SIGNIFICANT CHANGE UP
APTT BLD: 40.5 SEC — HIGH (ref 27–39.2)
AST SERPL-CCNC: 18 U/L — SIGNIFICANT CHANGE UP (ref 0–41)
BACTERIA # UR AUTO: NEGATIVE — SIGNIFICANT CHANGE UP
BASOPHILS # BLD AUTO: 0.03 K/UL — SIGNIFICANT CHANGE UP (ref 0–0.2)
BASOPHILS NFR BLD AUTO: 0.6 % — SIGNIFICANT CHANGE UP (ref 0–1)
BILIRUB SERPL-MCNC: 0.3 MG/DL — SIGNIFICANT CHANGE UP (ref 0.2–1.2)
BILIRUB UR-MCNC: NEGATIVE — SIGNIFICANT CHANGE UP
BUN SERPL-MCNC: 23 MG/DL — HIGH (ref 10–20)
CALCIUM SERPL-MCNC: 9.4 MG/DL — SIGNIFICANT CHANGE UP (ref 8.4–10.5)
CHLORIDE SERPL-SCNC: 104 MMOL/L — SIGNIFICANT CHANGE UP (ref 98–110)
CO2 SERPL-SCNC: 26 MMOL/L — SIGNIFICANT CHANGE UP (ref 17–32)
COLOR SPEC: YELLOW — SIGNIFICANT CHANGE UP
CREAT SERPL-MCNC: 0.7 MG/DL — SIGNIFICANT CHANGE UP (ref 0.7–1.5)
DIFF PNL FLD: NEGATIVE — SIGNIFICANT CHANGE UP
EGFR: 95 ML/MIN/1.73M2 — SIGNIFICANT CHANGE UP
EOSINOPHIL # BLD AUTO: 0.08 K/UL — SIGNIFICANT CHANGE UP (ref 0–0.7)
EOSINOPHIL NFR BLD AUTO: 1.5 % — SIGNIFICANT CHANGE UP (ref 0–8)
EPI CELLS # UR: 4 /HPF — SIGNIFICANT CHANGE UP (ref 0–5)
GLUCOSE SERPL-MCNC: 96 MG/DL — SIGNIFICANT CHANGE UP (ref 70–99)
GLUCOSE UR QL: NEGATIVE — SIGNIFICANT CHANGE UP
HCT VFR BLD CALC: 30.9 % — LOW (ref 37–47)
HGB BLD-MCNC: 9.7 G/DL — LOW (ref 12–16)
HYALINE CASTS # UR AUTO: 0 /LPF — SIGNIFICANT CHANGE UP (ref 0–7)
IMM GRANULOCYTES NFR BLD AUTO: 0.2 % — SIGNIFICANT CHANGE UP (ref 0.1–0.3)
INR BLD: 1.2 RATIO — SIGNIFICANT CHANGE UP (ref 0.65–1.3)
KETONES UR-MCNC: NEGATIVE — SIGNIFICANT CHANGE UP
LEUKOCYTE ESTERASE UR-ACNC: ABNORMAL
LYMPHOCYTES # BLD AUTO: 1.75 K/UL — SIGNIFICANT CHANGE UP (ref 1.2–3.4)
LYMPHOCYTES # BLD AUTO: 32.5 % — SIGNIFICANT CHANGE UP (ref 20.5–51.1)
MCHC RBC-ENTMCNC: 24.9 PG — LOW (ref 27–31)
MCHC RBC-ENTMCNC: 31.4 G/DL — LOW (ref 32–37)
MCV RBC AUTO: 79.4 FL — LOW (ref 81–99)
MONOCYTES # BLD AUTO: 0.37 K/UL — SIGNIFICANT CHANGE UP (ref 0.1–0.6)
MONOCYTES NFR BLD AUTO: 6.9 % — SIGNIFICANT CHANGE UP (ref 1.7–9.3)
MRSA PCR RESULT.: NEGATIVE — SIGNIFICANT CHANGE UP
NEUTROPHILS # BLD AUTO: 3.15 K/UL — SIGNIFICANT CHANGE UP (ref 1.4–6.5)
NEUTROPHILS NFR BLD AUTO: 58.3 % — SIGNIFICANT CHANGE UP (ref 42.2–75.2)
NITRITE UR-MCNC: NEGATIVE — SIGNIFICANT CHANGE UP
NRBC # BLD: 0 /100 WBCS — SIGNIFICANT CHANGE UP (ref 0–0)
PH UR: 6 — SIGNIFICANT CHANGE UP (ref 5–8)
PLATELET # BLD AUTO: 213 K/UL — SIGNIFICANT CHANGE UP (ref 130–400)
POTASSIUM SERPL-MCNC: 4.7 MMOL/L — SIGNIFICANT CHANGE UP (ref 3.5–5)
POTASSIUM SERPL-SCNC: 4.7 MMOL/L — SIGNIFICANT CHANGE UP (ref 3.5–5)
PROT SERPL-MCNC: 7.3 G/DL — SIGNIFICANT CHANGE UP (ref 6–8)
PROT UR-MCNC: SIGNIFICANT CHANGE UP
PROTHROM AB SERPL-ACNC: 13.7 SEC — HIGH (ref 9.95–12.87)
RBC # BLD: 3.89 M/UL — LOW (ref 4.2–5.4)
RBC # FLD: 16.7 % — HIGH (ref 11.5–14.5)
RBC CASTS # UR COMP ASSIST: 3 /HPF — SIGNIFICANT CHANGE UP (ref 0–4)
SODIUM SERPL-SCNC: 142 MMOL/L — SIGNIFICANT CHANGE UP (ref 135–146)
SP GR SPEC: 1.02 — SIGNIFICANT CHANGE UP (ref 1.01–1.03)
UROBILINOGEN FLD QL: SIGNIFICANT CHANGE UP
WBC # BLD: 5.39 K/UL — SIGNIFICANT CHANGE UP (ref 4.8–10.8)
WBC # FLD AUTO: 5.39 K/UL — SIGNIFICANT CHANGE UP (ref 4.8–10.8)
WBC UR QL: 3 /HPF — SIGNIFICANT CHANGE UP (ref 0–5)

## 2022-11-29 NOTE — H&P PST ADULT - HISTORY OF PRESENT ILLNESS
66 yo female presents for PAST in preparation for Amuoet/JULIA  On 10/21/2022 Pt was in ED for chest pain and pulpations   P reports  Sudden onset of substernal chest pain, SOB, jaw pain and headache 30 mins PTA. PT was resting at the time. Took  prior to coming to ED. Pt reports she has had similar symptoms twice in the past 3 years; was seen in ED at first episode but does not remember outcome, and did not seek medial attention for the second episode.  Now pt is scheduled for Ampul and JULIA on 12/20/22  Currently denies any chest pain, difficulty breathing, SOB, palpitations, dysuria, URI, or any other infections in the last 2 weeks/1 month. Denies any recent travel, contact, or exposure to any persons with known or suspected COVID-19. Pt also denies COVID testing within the last 2 weeks. Pt advised to self quarantine until day of procedure. Exercise tolerance of " NO LIMIT how far I can walk"  without dyspnea. PRISCA reviewed with patient.    Anesthesia Alert  NO--Difficult Airway, CLASS IV  NO--History of neck surgery or radiation  NO--Limited ROM of neck  NO--History of Malignant hyperthermia  NO--Personal or family history of Pseudocholinesterase deficiency.  YES--Prior Anesthesia Complication, PONV  NO--Latex Allergy  NO--Loose teeth  NO--History of Rheumatoid Arthritis  YES--PRISCA, C PAP  NO--Bleeding risk, CELEBREX  NO--Other_____   written and verbal instructions with teach back on chlorhexidine shampoo provided,  pt verbalized understanding with returned demonstration  Patient verbalized understanding of instructions and was given the opportunity to ask questions and have them answered.   66 yo female presents for PAST in preparation for Amuoet/JULIA  On 10/21/2022 Pt was in ED for chest pain and pulpations (arrhythmia)   Patient  reports  sudden onset of substernal chest pain, SOB, jaw pain and headache 30 mins PTA. PT was resting at the time. Took  prior to coming to ED. Pt reports she has had similar symptoms twice in the past 3 years; was seen in ED at first episode but does not remember outcome, and did not seek medial attention for the second episode.  Now pt is scheduled for Ampul and JULIA on 12/20/22. Pt also states that she did have occasional palpitation post menopausal period, but recently they are "more frequent".    Currently denies any chest pain, difficulty breathing, SOB, palpitations, dysuria, URI, or any other infections in the last 2 weeks/1 month. Denies any recent travel, contact, or exposure to any persons with known or suspected COVID-19. Pt also denies COVID testing within the last 2 weeks. Pt advised to self quarantine until day of procedure. Exercise tolerance of " NO LIMIT how far I can walk"  without dyspnea. PRISCA reviewed with patient.    Anesthesia Alert  NO--Difficult Airway, CLASS IV  NO--History of neck surgery or radiation  NO--Limited ROM of neck  NO--History of Malignant hyperthermia  NO--Personal or family history of Pseudocholinesterase deficiency.  YES--Prior Anesthesia Complication, PONV  NO--Latex Allergy  NO--Loose teeth  NO--History of Rheumatoid Arthritis  YES--PRISCA, C PAP  NO--Bleeding risk, CELEBREX, ELIQUIS   NO--Other_____   written and verbal instructions with teach back on chlorhexidine shampoo provided,  pt verbalized understanding with returned demonstration  Patient verbalized understanding of instructions and was given the opportunity to ask questions and have them answered.

## 2022-11-29 NOTE — H&P PST ADULT - NSICDXPASTMEDICALHX_GEN_ALL_CORE_FT
PAST MEDICAL HISTORY:  Depression     GERD (gastroesophageal reflux disease)     H/O chest pain 10/21/22    Hypertension     Obese     PRISCA on CPAP     Palpitation 10/21/22

## 2022-11-29 NOTE — H&P PST ADULT - REASON FOR ADMISSION
Case Type: OP Block TimeSuite: [facility] LabProceduralist: Grabiel Rodriguez  Confirmed Surgery DateTime: 12-   Procedure: JULIA/AMULET  ERP?: UnavailableLaterality: N/ALength of Procedure: 180 Minutes  Anesthesia Type: General

## 2023-01-04 ENCOUNTER — APPOINTMENT (OUTPATIENT)
Dept: OTOLARYNGOLOGY | Facility: CLINIC | Age: 68
End: 2023-01-04
Payer: MEDICARE

## 2023-01-04 VITALS — BODY MASS INDEX: 33.66 KG/M2 | WEIGHT: 190 LBS | HEIGHT: 63 IN

## 2023-01-04 DIAGNOSIS — H66.92 OTITIS MEDIA, UNSPECIFIED, LEFT EAR: ICD-10-CM

## 2023-01-04 DIAGNOSIS — H92.02 OTALGIA, LEFT EAR: ICD-10-CM

## 2023-01-04 DIAGNOSIS — M26.609 UNSPECIFIED TEMPOROMANDIBULAR JOINT DISORDER: ICD-10-CM

## 2023-01-04 PROCEDURE — 99204 OFFICE O/P NEW MOD 45 MIN: CPT | Mod: 25

## 2023-01-04 PROCEDURE — 69210 REMOVE IMPACTED EAR WAX UNI: CPT

## 2023-01-04 NOTE — HISTORY OF PRESENT ILLNESS
[de-identified] : Patient presents today c/o left ear and neck  pain.  She has been suffering with this for 1 1/2 years.  She denies any problems eating or swallowing .  She had a MRI of the TMJ done 12/03/2022.

## 2023-01-04 NOTE — DATA REVIEWED
[de-identified] : Test was reviewed by me.\par MRI 12/2/22- displacement of tmj on the right \par consistent with osteoarthrosis

## 2023-01-04 NOTE — PHYSICAL EXAM
[de-identified] : cracking and tnderness  [de-identified] : cerumen bilateral, externa on the left  [Normal] : mucosa is normal [Midline] : trachea located in midline position

## 2023-01-10 ENCOUNTER — OUTPATIENT (OUTPATIENT)
Dept: OUTPATIENT SERVICES | Facility: HOSPITAL | Age: 68
LOS: 1 days | Discharge: HOME | End: 2023-01-10

## 2023-01-10 VITALS
WEIGHT: 184.09 LBS | HEIGHT: 63 IN | DIASTOLIC BLOOD PRESSURE: 70 MMHG | OXYGEN SATURATION: 97 % | SYSTOLIC BLOOD PRESSURE: 118 MMHG | TEMPERATURE: 98 F | HEART RATE: 70 BPM | RESPIRATION RATE: 16 BRPM

## 2023-01-10 DIAGNOSIS — I48.0 PAROXYSMAL ATRIAL FIBRILLATION: ICD-10-CM

## 2023-01-10 DIAGNOSIS — Z90.49 ACQUIRED ABSENCE OF OTHER SPECIFIED PARTS OF DIGESTIVE TRACT: Chronic | ICD-10-CM

## 2023-01-10 DIAGNOSIS — Z01.818 ENCOUNTER FOR OTHER PREPROCEDURAL EXAMINATION: ICD-10-CM

## 2023-01-10 DIAGNOSIS — Z87.59 PERSONAL HISTORY OF OTHER COMPLICATIONS OF PREGNANCY, CHILDBIRTH AND THE PUERPERIUM: Chronic | ICD-10-CM

## 2023-01-10 PROBLEM — R00.2 PALPITATIONS: Chronic | Status: ACTIVE | Noted: 2022-11-29

## 2023-01-10 PROBLEM — Z87.898 PERSONAL HISTORY OF OTHER SPECIFIED CONDITIONS: Chronic | Status: ACTIVE | Noted: 2022-11-29

## 2023-01-10 LAB
ALBUMIN SERPL ELPH-MCNC: 4.5 G/DL — SIGNIFICANT CHANGE UP (ref 3.5–5.2)
ALP SERPL-CCNC: 60 U/L — SIGNIFICANT CHANGE UP (ref 30–115)
ALT FLD-CCNC: 18 U/L — SIGNIFICANT CHANGE UP (ref 0–41)
ANION GAP SERPL CALC-SCNC: 10 MMOL/L — SIGNIFICANT CHANGE UP (ref 7–14)
APPEARANCE UR: CLEAR — SIGNIFICANT CHANGE UP
APTT BLD: 37.5 SEC — SIGNIFICANT CHANGE UP (ref 27–39.2)
AST SERPL-CCNC: 21 U/L — SIGNIFICANT CHANGE UP (ref 0–41)
BACTERIA # UR AUTO: NEGATIVE — SIGNIFICANT CHANGE UP
BASOPHILS # BLD AUTO: 0.05 K/UL — SIGNIFICANT CHANGE UP (ref 0–0.2)
BASOPHILS NFR BLD AUTO: 1.2 % — HIGH (ref 0–1)
BILIRUB SERPL-MCNC: 0.4 MG/DL — SIGNIFICANT CHANGE UP (ref 0.2–1.2)
BILIRUB UR-MCNC: NEGATIVE — SIGNIFICANT CHANGE UP
BUN SERPL-MCNC: 21 MG/DL — HIGH (ref 10–20)
CALCIUM SERPL-MCNC: 9.7 MG/DL — SIGNIFICANT CHANGE UP (ref 8.4–10.5)
CHLORIDE SERPL-SCNC: 104 MMOL/L — SIGNIFICANT CHANGE UP (ref 98–110)
CO2 SERPL-SCNC: 28 MMOL/L — SIGNIFICANT CHANGE UP (ref 17–32)
COLOR SPEC: COLORLESS — SIGNIFICANT CHANGE UP
CREAT SERPL-MCNC: 0.8 MG/DL — SIGNIFICANT CHANGE UP (ref 0.7–1.5)
DIFF PNL FLD: NEGATIVE — SIGNIFICANT CHANGE UP
EGFR: 81 ML/MIN/1.73M2 — SIGNIFICANT CHANGE UP
EOSINOPHIL # BLD AUTO: 0.1 K/UL — SIGNIFICANT CHANGE UP (ref 0–0.7)
EOSINOPHIL NFR BLD AUTO: 2.4 % — SIGNIFICANT CHANGE UP (ref 0–8)
EPI CELLS # UR: 1 /HPF — SIGNIFICANT CHANGE UP (ref 0–5)
GLUCOSE SERPL-MCNC: 97 MG/DL — SIGNIFICANT CHANGE UP (ref 70–99)
GLUCOSE UR QL: NEGATIVE — SIGNIFICANT CHANGE UP
HCT VFR BLD CALC: 32 % — LOW (ref 37–47)
HGB BLD-MCNC: 9.9 G/DL — LOW (ref 12–16)
HYALINE CASTS # UR AUTO: 1 /LPF — SIGNIFICANT CHANGE UP (ref 0–7)
IMM GRANULOCYTES NFR BLD AUTO: 0.2 % — SIGNIFICANT CHANGE UP (ref 0.1–0.3)
INR BLD: 0.91 RATIO — SIGNIFICANT CHANGE UP (ref 0.65–1.3)
KETONES UR-MCNC: NEGATIVE — SIGNIFICANT CHANGE UP
LEUKOCYTE ESTERASE UR-ACNC: ABNORMAL
LYMPHOCYTES # BLD AUTO: 1.65 K/UL — SIGNIFICANT CHANGE UP (ref 1.2–3.4)
LYMPHOCYTES # BLD AUTO: 39.7 % — SIGNIFICANT CHANGE UP (ref 20.5–51.1)
MCHC RBC-ENTMCNC: 23.7 PG — LOW (ref 27–31)
MCHC RBC-ENTMCNC: 30.9 G/DL — LOW (ref 32–37)
MCV RBC AUTO: 76.7 FL — LOW (ref 81–99)
MONOCYTES # BLD AUTO: 0.27 K/UL — SIGNIFICANT CHANGE UP (ref 0.1–0.6)
MONOCYTES NFR BLD AUTO: 6.5 % — SIGNIFICANT CHANGE UP (ref 1.7–9.3)
MRSA PCR RESULT.: NEGATIVE — SIGNIFICANT CHANGE UP
NEUTROPHILS # BLD AUTO: 2.08 K/UL — SIGNIFICANT CHANGE UP (ref 1.4–6.5)
NEUTROPHILS NFR BLD AUTO: 50 % — SIGNIFICANT CHANGE UP (ref 42.2–75.2)
NITRITE UR-MCNC: NEGATIVE — SIGNIFICANT CHANGE UP
NRBC # BLD: 0 /100 WBCS — SIGNIFICANT CHANGE UP (ref 0–0)
PH UR: 6 — SIGNIFICANT CHANGE UP (ref 5–8)
PLATELET # BLD AUTO: 183 K/UL — SIGNIFICANT CHANGE UP (ref 130–400)
POTASSIUM SERPL-MCNC: 4.1 MMOL/L — SIGNIFICANT CHANGE UP (ref 3.5–5)
POTASSIUM SERPL-SCNC: 4.1 MMOL/L — SIGNIFICANT CHANGE UP (ref 3.5–5)
PROT SERPL-MCNC: 7.5 G/DL — SIGNIFICANT CHANGE UP (ref 6–8)
PROT UR-MCNC: NEGATIVE — SIGNIFICANT CHANGE UP
PROTHROM AB SERPL-ACNC: 10.3 SEC — SIGNIFICANT CHANGE UP (ref 9.95–12.87)
RBC # BLD: 4.17 M/UL — LOW (ref 4.2–5.4)
RBC # FLD: 15.9 % — HIGH (ref 11.5–14.5)
RBC CASTS # UR COMP ASSIST: 2 /HPF — SIGNIFICANT CHANGE UP (ref 0–4)
SODIUM SERPL-SCNC: 142 MMOL/L — SIGNIFICANT CHANGE UP (ref 135–146)
SP GR SPEC: 1.01 — SIGNIFICANT CHANGE UP (ref 1.01–1.03)
UROBILINOGEN FLD QL: SIGNIFICANT CHANGE UP
WBC # BLD: 4.16 K/UL — LOW (ref 4.8–10.8)
WBC # FLD AUTO: 4.16 K/UL — LOW (ref 4.8–10.8)
WBC UR QL: 2 /HPF — SIGNIFICANT CHANGE UP (ref 0–5)

## 2023-01-10 NOTE — H&P PST ADULT - NSICDXPASTMEDICALHX_GEN_ALL_CORE_FT
PAST MEDICAL HISTORY:  Afib     Anemia     Depression     Diverticulitis     GERD (gastroesophageal reflux disease)     H/O cardiac arrhythmia     H/O chest pain 10/21/22    History of lower GI bleeding     Hypertension     Obese     PRISCA on CPAP     Palpitation 10/21/22

## 2023-01-10 NOTE — H&P PST ADULT - HISTORY OF PRESENT ILLNESS
67YR old female with h/o Afib on Eliquis - is scheduled for AMULET DEVICE  and JULIA on 1/24/23 Same procedure cancelled 3 times -d/t CP and physician unavailability. States " I' m bleeding through my anus from the diverticulitis, losing a lot of blood, I want to get off the Eliquis , I am taking it only once a day now"  Currently denies any chest pain, difficulty breathing, SOB, palpitations, dysuria, URI, or any other infections in the last 2 weeks/1 month. Denies any recent travel, contact, or exposure to any persons with known or suspected COVID-19. Did not receive COVID vaccine. Pt also denies COVID testing within the last 2 weeks. Pt advised to self quarantine until day of procedure. Exercise tolerance- 2-3 flat blocks LTD by fatigue.  Anesthesia Alert  YES--Difficult Airway CLASS 4  NO--History of neck surgery or radiation  NO--Limited ROM of neck  NO--History of Malignant hyperthermia  NO--Personal or family history of Pseudocholinesterase deficiency  NO--Prior Anesthesia Complication  NO--Latex Allergy  NO--Loose teeth  NO--History of Rheumatoid Arthritis  YES--PRISCA  YES Bleeding risk  NO--Other_____

## 2023-01-10 NOTE — H&P PST ADULT - HEIGHT IN INCHES
I heard patient's voice on her voicemail and left her very detailed message regarding her sacral MRI and labs.  I am ordering a pelvic ultrasound because of the mild abnormality seen on her MRI.  I said I would have somebody contact her when the lumbar spine MRI is back.   3

## 2023-01-11 LAB
CULTURE RESULTS: SIGNIFICANT CHANGE UP
SPECIMEN SOURCE: SIGNIFICANT CHANGE UP

## 2023-01-24 ENCOUNTER — INPATIENT (INPATIENT)
Facility: HOSPITAL | Age: 68
LOS: 0 days | Discharge: HOME | End: 2023-01-25
Attending: STUDENT IN AN ORGANIZED HEALTH CARE EDUCATION/TRAINING PROGRAM | Admitting: STUDENT IN AN ORGANIZED HEALTH CARE EDUCATION/TRAINING PROGRAM
Payer: MEDICARE

## 2023-01-24 ENCOUNTER — APPOINTMENT (OUTPATIENT)
Dept: ORTHOPEDIC SURGERY | Facility: CLINIC | Age: 68
End: 2023-01-24

## 2023-01-24 ENCOUNTER — TRANSCRIPTION ENCOUNTER (OUTPATIENT)
Age: 68
End: 2023-01-24

## 2023-01-24 VITALS — HEIGHT: 63 IN | WEIGHT: 190.04 LBS
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
## 2023-01-24 DIAGNOSIS — Z90.49 ACQUIRED ABSENCE OF OTHER SPECIFIED PARTS OF DIGESTIVE TRACT: Chronic | ICD-10-CM
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
## 2023-01-24 DIAGNOSIS — Z87.59 PERSONAL HISTORY OF OTHER COMPLICATIONS OF PREGNANCY, CHILDBIRTH AND THE PUERPERIUM: Chronic | ICD-10-CM

## 2023-01-24 DIAGNOSIS — E66.9 OBESITY, UNSPECIFIED: ICD-10-CM

## 2023-01-24 DIAGNOSIS — T45.515A ADVERSE EFFECT OF ANTICOAGULANTS, INITIAL ENCOUNTER: ICD-10-CM

## 2023-01-24 DIAGNOSIS — G47.33 OBSTRUCTIVE SLEEP APNEA (ADULT) (PEDIATRIC): ICD-10-CM

## 2023-01-24 DIAGNOSIS — Z88.0 ALLERGY STATUS TO PENICILLIN: ICD-10-CM

## 2023-01-24 DIAGNOSIS — Z28.310 UNVACCINATED FOR COVID-19: ICD-10-CM

## 2023-01-24 DIAGNOSIS — D68.32 HEMORRHAGIC DISORDER DUE TO EXTRINSIC CIRCULATING ANTICOAGULANTS: ICD-10-CM

## 2023-01-24 DIAGNOSIS — I10 ESSENTIAL (PRIMARY) HYPERTENSION: ICD-10-CM

## 2023-01-24 DIAGNOSIS — D64.9 ANEMIA, UNSPECIFIED: ICD-10-CM

## 2023-01-24 DIAGNOSIS — Z79.01 LONG TERM (CURRENT) USE OF ANTICOAGULANTS: ICD-10-CM

## 2023-01-24 DIAGNOSIS — I97.618 POSTPROCEDURAL HEMORRHAGE OF A CIRCULATORY SYSTEM ORGAN OR STRUCTURE FOLLOWING OTHER CIRCULATORY SYSTEM PROCEDURE: ICD-10-CM

## 2023-01-24 DIAGNOSIS — I48.0 PAROXYSMAL ATRIAL FIBRILLATION: ICD-10-CM

## 2023-01-24 DIAGNOSIS — K21.9 GASTRO-ESOPHAGEAL REFLUX DISEASE WITHOUT ESOPHAGITIS: ICD-10-CM

## 2023-01-24 PROCEDURE — 33340 PERQ CLSR TCAT L ATR APNDGE: CPT | Mod: Q0

## 2023-01-24 PROCEDURE — 76937 US GUIDE VASCULAR ACCESS: CPT | Mod: 26

## 2023-01-24 RX ORDER — CLOPIDOGREL BISULFATE 75 MG/1
75 TABLET, FILM COATED ORAL DAILY
Refills: 0 | Status: DISCONTINUED | OUTPATIENT
Start: 2023-01-24 | End: 2023-01-25

## 2023-01-24 RX ORDER — ASPIRIN/CALCIUM CARB/MAGNESIUM 324 MG
81 TABLET ORAL DAILY
Refills: 0 | Status: DISCONTINUED | OUTPATIENT
Start: 2023-01-24 | End: 2023-01-25

## 2023-01-24 RX ORDER — CLOPIDOGREL BISULFATE 75 MG/1
1 TABLET, FILM COATED ORAL
Qty: 30 | Refills: 0
Start: 2023-01-24 | End: 2023-02-22

## 2023-01-24 RX ORDER — PANTOPRAZOLE SODIUM 20 MG/1
40 TABLET, DELAYED RELEASE ORAL
Refills: 0 | Status: DISCONTINUED | OUTPATIENT
Start: 2023-01-24 | End: 2023-01-25

## 2023-01-24 RX ORDER — ASPIRIN/CALCIUM CARB/MAGNESIUM 324 MG
1 TABLET ORAL
Qty: 30 | Refills: 0
Start: 2023-01-24 | End: 2023-02-22

## 2023-01-24 RX ORDER — VANCOMYCIN HCL 1 G
1000 VIAL (EA) INTRAVENOUS ONCE
Refills: 0 | Status: COMPLETED | OUTPATIENT
Start: 2023-01-24 | End: 2023-01-24

## 2023-01-24 RX ORDER — LANOLIN ALCOHOL/MO/W.PET/CERES
3 CREAM (GRAM) TOPICAL AT BEDTIME
Refills: 0 | Status: DISCONTINUED | OUTPATIENT
Start: 2023-01-24 | End: 2023-01-25

## 2023-01-24 RX ORDER — VANCOMYCIN HCL 1 G
1000 VIAL (EA) INTRAVENOUS EVERY 12 HOURS
Refills: 0 | Status: COMPLETED | OUTPATIENT
Start: 2023-01-24 | End: 2023-01-25

## 2023-01-24 RX ORDER — METOPROLOL TARTRATE 50 MG
25 TABLET ORAL DAILY
Refills: 0 | Status: DISCONTINUED | OUTPATIENT
Start: 2023-01-24 | End: 2023-01-25

## 2023-01-24 RX ORDER — ACETAMINOPHEN 500 MG
650 TABLET ORAL EVERY 6 HOURS
Refills: 0 | Status: DISCONTINUED | OUTPATIENT
Start: 2023-01-24 | End: 2023-01-25

## 2023-01-24 RX ADMIN — Medication 250 MILLIGRAM(S): at 08:48

## 2023-01-24 RX ADMIN — Medication 81 MILLIGRAM(S): at 13:40

## 2023-01-24 RX ADMIN — Medication 250 MILLIGRAM(S): at 19:45

## 2023-01-24 RX ADMIN — CLOPIDOGREL BISULFATE 75 MILLIGRAM(S): 75 TABLET, FILM COATED ORAL at 13:41

## 2023-01-24 NOTE — DISCHARGE NOTE PROVIDER - CARE PROVIDER_API CALL
Grabiel Rodriguez)  Cardiac Electrophysiology; Cardiology  [address] [place] [address]  Phone: [phone]  Fax: [phone]  Follow Up Time: 1 month

## 2023-01-24 NOTE — DISCHARGE NOTE PROVIDER - NSDCMRMEDTOKEN_GEN_ALL_CORE_FT
aspirin 81 mg oral tablet, chewable: 1 tab(s) orally once a day  clopidogrel 75 mg oral tablet: 1 tab(s) orally once a day  Dexilant 60 mg oral delayed release capsule: 1 cap(s) orally once a day, As Needed  enalapril-hydrochlorothiazide 5 mg-12.5 mg oral tablet: 1 tab(s) orally once a day  metoprolol succinate 25 mg oral tablet, extended release: 1 tab(s) orally once a day

## 2023-01-24 NOTE — DISCHARGE NOTE PROVIDER - HOSPITAL COURSE
Patient is a 66yo female  with PMHx of paroxsymal Afib on Eliquis ( dx 10/22), anemia, Lower G.I bleed, diverticulitis now presented for scheduled Left atrial appendage occlusion ( LAAO) with amulet due to multiple episodes of lower G.I bleeds.On 1/24TH, patient underwent successful Left Atrial Appendage Closure (Amulet) by Dr. Rodriguez. POD 0, patient had oozing from right femoral vein access site secondary to anticoagulants, which stopped with epi/lido injection.   Patient was monitored overnight. On POD 1 patient remains HD stable with no complaints. Examination of left and  right femoral vein access site C/D/I. No hematoma , no bleeding. Patient will be discharged home on ASA and plavix and discontinue oral anticoagulants ( eliquis) . Patient is to followup with EP as outpatient. Patient is a 68yo female  with PMHx of paroxsymal Afib on Eliquis ( dx 10/22), anemia, Lower G.I bleed, diverticulitis now presented for scheduled Left atrial appendage occlusion ( LAAO) with amulet due to multiple episodes of lower G.I bleeds. On 1/24, patient underwent successful Left Atrial Appendage Closure (Amulet) by Dr. Rodriguez. POD 0, patient had oozing from right femoral vein access site secondary to anticoagulants, which stopped with epi/lido injection.   Patient was monitored overnight. On POD 1 patient remains HD stable with no complaints. Examination of left and  right femoral vein access site C/D/I. No hematoma , no bleeding. Patient will be discharged home on ASA and plavix and discontinue oral anticoagulants (eliquis) . Patient is to followup with EP as outpatient. Patient is a 66yo female with history of paroxsymal AF on Eliquis (dx 10/22), anemia 2/2 diverticulitis with LGIB, now presenting for scheduled left atrial appendage occlusion ( LAAO) with Amulet due to multiple episodes of lower G.I bleeds. On 1/24, patient underwent successful Amulet placement with Dr. Rodriguez. POD 0, patient had oozing from right femoral vein access site secondary to anticoagulants, which stopped with epi/lido injection. Patient was monitored overnight. On POD 1 patient remains HD stable with no complaints. Examination of left and right femoral vein access site C/D/I. No hematoma , no bleeding. Patient will be discharged home on ASA and plavix and discontinue oral anticoagulants (eliquis) . Patient is to followup with EP as outpatient.

## 2023-01-24 NOTE — PATIENT PROFILE ADULT - FALL HARM RISK - HARM RISK INTERVENTIONS

## 2023-01-24 NOTE — DISCHARGE NOTE PROVIDER - NSDCCPCAREPLAN_GEN_ALL_CORE_FT
PRINCIPAL DISCHARGE DIAGNOSIS  Diagnosis: Paroxysmal atrial fibrillation  Assessment and Plan of Treatment: status post left atrial appendage occlusion

## 2023-01-24 NOTE — DISCHARGE NOTE PROVIDER - NSDCCPTREATMENT_GEN_ALL_CORE_FT
PRINCIPAL PROCEDURE  Procedure: Closure of left atrial appendage  Findings and Treatment: - Please start taking aspirin 81 mg daily and palvix 75mg daily  - You STOP taking Eliquis   - You may shower today.  - Do not drive or operate heavy machinery for 3 days.  - Do not submerge in water (example: baths, swimming) for 1 week.  - No squatting, exertional activities, or lifting anything > 10 lbs for 1 week.  - Any sudden swelling, redness, fever, discharge, or severe pain, call the Electrophysiology Office at [phone].

## 2023-01-24 NOTE — DISCHARGE NOTE PROVIDER - ATTENDING DISCHARGE PHYSICAL EXAMINATION:
I saw and evaluated the patient the day of discharge.  She is s/p elective LAAO with Amulet device by Dr. Rodriguez.  Stable for discharge with close outpatient follow up with EP.

## 2023-01-24 NOTE — CHART NOTE - NSCHARTNOTEFT_GEN_A_CORE
Patients right groin oozing post procedure secondary to anticoagulation. Site was injected with 5cc of epi w/ lido. Sutures kept in place. No hematoma and no bruising. Will continue to monitor.

## 2023-01-25 ENCOUNTER — TRANSCRIPTION ENCOUNTER (OUTPATIENT)
Age: 68
End: 2023-01-25

## 2023-01-25 VITALS
DIASTOLIC BLOOD PRESSURE: 57 MMHG | SYSTOLIC BLOOD PRESSURE: 107 MMHG | HEART RATE: 75 BPM | OXYGEN SATURATION: 95 % | TEMPERATURE: 99 F

## 2023-01-25 LAB
ANION GAP SERPL CALC-SCNC: 8 MMOL/L — SIGNIFICANT CHANGE UP (ref 7–14)
BUN SERPL-MCNC: 20 MG/DL — SIGNIFICANT CHANGE UP (ref 10–20)
CALCIUM SERPL-MCNC: 8.8 MG/DL — SIGNIFICANT CHANGE UP (ref 8.4–10.5)
CHLORIDE SERPL-SCNC: 107 MMOL/L — SIGNIFICANT CHANGE UP (ref 98–110)
CO2 SERPL-SCNC: 29 MMOL/L — SIGNIFICANT CHANGE UP (ref 17–32)
CREAT SERPL-MCNC: 0.9 MG/DL — SIGNIFICANT CHANGE UP (ref 0.7–1.5)
EGFR: 70 ML/MIN/1.73M2 — SIGNIFICANT CHANGE UP
GLUCOSE SERPL-MCNC: 96 MG/DL — SIGNIFICANT CHANGE UP (ref 70–99)
MAGNESIUM SERPL-MCNC: 2 MG/DL — SIGNIFICANT CHANGE UP (ref 1.8–2.4)
POTASSIUM SERPL-MCNC: 4.6 MMOL/L — SIGNIFICANT CHANGE UP (ref 3.5–5)
POTASSIUM SERPL-SCNC: 4.6 MMOL/L — SIGNIFICANT CHANGE UP (ref 3.5–5)
SODIUM SERPL-SCNC: 144 MMOL/L — SIGNIFICANT CHANGE UP (ref 135–146)

## 2023-01-25 PROCEDURE — 99233 SBSQ HOSP IP/OBS HIGH 50: CPT

## 2023-01-25 PROCEDURE — 99238 HOSP IP/OBS DSCHRG MGMT 30/<: CPT

## 2023-01-25 RX ADMIN — PANTOPRAZOLE SODIUM 40 MILLIGRAM(S): 20 TABLET, DELAYED RELEASE ORAL at 05:31

## 2023-01-25 RX ADMIN — Medication 3 MILLIGRAM(S): at 00:28

## 2023-01-25 RX ADMIN — Medication 25 MILLIGRAM(S): at 05:31

## 2023-01-25 RX ADMIN — Medication 250 MILLIGRAM(S): at 07:59

## 2023-01-25 RX ADMIN — Medication 81 MILLIGRAM(S): at 11:06

## 2023-01-25 RX ADMIN — CLOPIDOGREL BISULFATE 75 MILLIGRAM(S): 75 TABLET, FILM COATED ORAL at 11:06

## 2023-01-25 NOTE — PROGRESS NOTE ADULT - SUBJECTIVE AND OBJECTIVE BOX
INTERVAL HPI/OVERNIGHT EVENTS:    Patient s/p     Amulet MARYJANE occlusion device           No events over night      MEDICATIONS  (STANDING):  aspirin  chewable 81 milliGRAM(s) Oral daily  clopidogrel Tablet 75 milliGRAM(s) Oral daily  metoprolol succinate ER 25 milliGRAM(s) Oral daily  pantoprazole    Tablet 40 milliGRAM(s) Oral before breakfast    MEDICATIONS  (PRN):  acetaminophen     Tablet .. 650 milliGRAM(s) Oral every 6 hours PRN Moderate Pain (4 - 6)  melatonin 3 milliGRAM(s) Oral at bedtime PRN Insomnia      Allergies    penicillin (Anaphylaxis)    Intolerances          Vital Signs Last 24 Hrs  T(C): 37.3 (25 Jan 2023 07:34), Max: 37.3 (25 Jan 2023 07:34)  T(F): 99.2 (25 Jan 2023 07:34), Max: 99.2 (25 Jan 2023 07:34)  HR: 75 (25 Jan 2023 07:34) (60 - 83)  BP: 107/57 (25 Jan 2023 07:34) (99/57 - 114/59)  BP(mean): 76 (25 Jan 2023 07:34) (75 - 81)  RR: 18 (25 Jan 2023 05:30) (18 - 18)  SpO2: 95% (25 Jan 2023 07:34) (94% - 99%)    Parameters below as of 25 Jan 2023 05:30  Patient On (Oxygen Delivery Method): room air        GENERAL: In no apparent distress, well nourished, and hydrated.  HEART: IRRegular rate and rhythm; No murmurs, rubs, or gallops.  PULMONARY: Clear to auscultation and perfusion.  No rales, wheezing, or rhonchi bilaterally.  ABDOMEN: Soft, Nontender, Nondistended; Bowel sounds present  EXTREMITIES:  2+ Peripheral Pulses, No clubbing, cyanosis, or edema  groins No hematoma, no bleeding; ***stiches removed  NEUROLOGICAL: Grossly nonfocal    LABS:    01-25    144  |  107  |  20  ----------------------------<  96  4.6   |  29  |  0.9    Ca    8.8      25 Jan 2023 05:54  Mg     2.0     01-25            EKG:      A/P  Patient S/P     Amulet LAAO  Patient is doing well  - Stop Eliquis  - start Aspirin 81mg daily and Plavix 75mg daily  - No heavy lifting or exertional activities for 5-7days  -  no driving for 1 week  - Can take a shower, no bathtub for 5days, do not submerge yourself in water  - FU in EP office with Dr Rodriguez in 1 month

## 2023-01-25 NOTE — DISCHARGE NOTE NURSING/CASE MANAGEMENT/SOCIAL WORK - PATIENT PORTAL LINK FT
You can access the FollowMyHealth Patient Portal offered by [facility] by registering at the following website: [email or web address]. By joining [employer]’s FollowMyHealth portal, you will also be able to view your health information using other applications (apps) compatible with our system.

## 2023-01-26 PROBLEM — Z86.79 PERSONAL HISTORY OF OTHER DISEASES OF THE CIRCULATORY SYSTEM: Chronic | Status: ACTIVE | Noted: 2023-01-10

## 2023-01-26 PROBLEM — I48.91 UNSPECIFIED ATRIAL FIBRILLATION: Chronic | Status: ACTIVE | Noted: 2023-01-10

## 2023-01-26 PROBLEM — D64.9 ANEMIA, UNSPECIFIED: Chronic | Status: ACTIVE | Noted: 2023-01-10

## 2023-01-26 PROBLEM — Z87.19 PERSONAL HISTORY OF OTHER DISEASES OF THE DIGESTIVE SYSTEM: Chronic | Status: ACTIVE | Noted: 2023-01-10

## 2023-01-26 PROBLEM — K57.92 DIVERTICULITIS OF INTESTINE, PART UNSPECIFIED, WITHOUT PERFORATION OR ABSCESS WITHOUT BLEEDING: Chronic | Status: ACTIVE | Noted: 2023-01-10

## 2023-02-06 ENCOUNTER — EMERGENCY (EMERGENCY)
Facility: HOSPITAL | Age: 68
LOS: 0 days | Discharge: LEFT BEFORE DISCHARGE | End: 2023-02-06
Attending: EMERGENCY MEDICINE
Payer: MEDICARE

## 2023-02-06 VITALS
DIASTOLIC BLOOD PRESSURE: 72 MMHG | RESPIRATION RATE: 20 BRPM | TEMPERATURE: 98 F | HEART RATE: 112 BPM | HEIGHT: 63 IN | OXYGEN SATURATION: 97 % | SYSTOLIC BLOOD PRESSURE: 142 MMHG

## 2023-02-06 DIAGNOSIS — Z90.49 ACQUIRED ABSENCE OF OTHER SPECIFIED PARTS OF DIGESTIVE TRACT: Chronic | ICD-10-CM

## 2023-02-06 DIAGNOSIS — Z87.59 PERSONAL HISTORY OF OTHER COMPLICATIONS OF PREGNANCY, CHILDBIRTH AND THE PUERPERIUM: Chronic | ICD-10-CM

## 2023-02-06 PROCEDURE — L9991: CPT

## 2023-02-06 PROCEDURE — 99283 EMERGENCY DEPT VISIT LOW MDM: CPT

## 2023-02-06 NOTE — ED PROVIDER NOTE - PHYSICAL EXAMINATION
_  Vital signs reviewed; ABCs intact  GENERAL: Well nourished, comfortable  SKIN: Warm, dry  HEAD & NECK: NCAT, supple neck  EYES: EOMI, PER B/L  ENT: MMM  CARD: RRR, S1, S2; no murmurs, no rubs, no gallops  RESP: Normal respiratory effort, CTAB, no rales, no wheezing  ABD: Soft, ND, NT, no rebound, no guarding, +BS; no CVAT  EXT: Pulses palpable distally  NEUROMSK: Grossly intact  PSYCH: AAOx3, cooperative, appropriate

## 2023-02-06 NOTE — ED PROVIDER NOTE - PROGRESS NOTE DETAILS
Resident AO: Patient eloped prior to any workup. Called her via telephone, and advised to return to the Emergency Department. Notified that she had a completely normal CCTA in October 2022. Nevertheless, advised that she can and should return to the ED at any time. All questions answered, patient expressed understanding, but would prefer outpatient followup. Resident AO: Patient eloped prior to any workup, and prior to being seen by the Attending Physician. Called her via telephone, and advised to return to the Emergency Department. Notified that she had a completely normal CCTA in October 2022. Nevertheless, advised that she can and should return to the ED at any time. All questions answered, patient expressed understanding, but would prefer outpatient followup.

## 2023-02-06 NOTE — ED PROVIDER NOTE - OBJECTIVE STATEMENT
Patient is a 67-year-old female with a history of paroxysmal A-fib on Eliquis, anemia, diverticulitis with lower GI bleed, recent amulet placement for left atrial appendage (Jan 2023; Dr. Rodriguez) Patient is a 67-year-old female with a history of paroxysmal A-fib on Eliquis, anemia, diverticulitis with lower GI bleed, recent amulet placement for left atrial appendage (Jan 2023; Dr. Rodriguez). Patient presents for chest pain for 1 hour prior to arrival, located sternally radiating to jaw with both shoulders as well.  Yesterday she had some shortness of breath at rest which has resolved since then.  Patient had a normal stress test 15 years ago and a normal CCTA in October 2022. No other complaints - no fever/chills, rhinorrhea, sore throat, cough, abd pain, NVD, dysuria, hematuria, new joint pain, FND, rash, trauma, melena, hematochezia.

## 2023-02-09 ENCOUNTER — APPOINTMENT (OUTPATIENT)
Dept: ELECTROPHYSIOLOGY | Facility: CLINIC | Age: 68
End: 2023-02-09
Payer: MEDICARE

## 2023-02-09 VITALS
WEIGHT: 185 LBS | TEMPERATURE: 98 F | HEART RATE: 81 BPM | RESPIRATION RATE: 16 BRPM | SYSTOLIC BLOOD PRESSURE: 133 MMHG | DIASTOLIC BLOOD PRESSURE: 82 MMHG | BODY MASS INDEX: 32.78 KG/M2 | HEIGHT: 63 IN

## 2023-02-09 PROCEDURE — 93000 ELECTROCARDIOGRAM COMPLETE: CPT

## 2023-02-09 PROCEDURE — 99214 OFFICE O/P EST MOD 30 MIN: CPT

## 2023-02-09 RX ORDER — APIXABAN 5 MG/1
5 TABLET, FILM COATED ORAL
Qty: 60 | Refills: 3 | Status: DISCONTINUED | COMMUNITY
Start: 2022-12-07 | End: 2023-02-09

## 2023-02-09 RX ORDER — CLOPIDOGREL BISULFATE 75 MG/1
75 TABLET, FILM COATED ORAL DAILY
Qty: 90 | Refills: 1 | Status: ACTIVE | COMMUNITY
Start: 2023-02-09 | End: 1900-01-01

## 2023-02-09 RX ORDER — ENALAPRIL MALEATE 10 MG/1
10 TABLET ORAL DAILY
Refills: 0 | Status: ACTIVE | COMMUNITY

## 2023-02-09 RX ORDER — NEOMYCIN SULFATE, POLYMYXIN B SULFATE AND HYDROCORTISONE 3.5; 10000; 1 MG/ML; [IU]/ML; MG/ML
3.5-10000-1 SOLUTION AURICULAR (OTIC) 4 TIMES DAILY
Qty: 2 | Refills: 3 | Status: COMPLETED | COMMUNITY
Start: 2023-01-04 | End: 2023-02-09

## 2023-02-09 RX ORDER — ASPIRIN 81 MG/1
81 TABLET, DELAYED RELEASE ORAL
Qty: 90 | Refills: 3 | Status: ACTIVE | COMMUNITY
Start: 2023-02-09 | End: 1900-01-01

## 2023-02-09 RX ORDER — ASPIRIN 81 MG
6.5 TABLET, DELAYED RELEASE (ENTERIC COATED) ORAL
Qty: 1 | Refills: 0 | Status: COMPLETED | COMMUNITY
Start: 2023-01-04 | End: 2023-02-09

## 2023-02-12 DIAGNOSIS — Z90.49 ACQUIRED ABSENCE OF OTHER SPECIFIED PARTS OF DIGESTIVE TRACT: ICD-10-CM

## 2023-02-12 DIAGNOSIS — Z79.82 LONG TERM (CURRENT) USE OF ASPIRIN: ICD-10-CM

## 2023-02-12 DIAGNOSIS — Z86.79 PERSONAL HISTORY OF OTHER DISEASES OF THE CIRCULATORY SYSTEM: ICD-10-CM

## 2023-02-12 DIAGNOSIS — Z87.19 PERSONAL HISTORY OF OTHER DISEASES OF THE DIGESTIVE SYSTEM: ICD-10-CM

## 2023-02-12 DIAGNOSIS — Z53.29 PROCEDURE AND TREATMENT NOT CARRIED OUT BECAUSE OF PATIENT'S DECISION FOR OTHER REASONS: ICD-10-CM

## 2023-02-12 DIAGNOSIS — Z99.89 DEPENDENCE ON OTHER ENABLING MACHINES AND DEVICES: ICD-10-CM

## 2023-02-12 DIAGNOSIS — Z88.0 ALLERGY STATUS TO PENICILLIN: ICD-10-CM

## 2023-02-12 DIAGNOSIS — F32.A DEPRESSION, UNSPECIFIED: ICD-10-CM

## 2023-02-12 DIAGNOSIS — Z79.01 LONG TERM (CURRENT) USE OF ANTICOAGULANTS: ICD-10-CM

## 2023-02-12 DIAGNOSIS — R07.9 CHEST PAIN, UNSPECIFIED: ICD-10-CM

## 2023-02-12 DIAGNOSIS — G47.33 OBSTRUCTIVE SLEEP APNEA (ADULT) (PEDIATRIC): ICD-10-CM

## 2023-02-12 DIAGNOSIS — Z79.02 LONG TERM (CURRENT) USE OF ANTITHROMBOTICS/ANTIPLATELETS: ICD-10-CM

## 2023-02-12 DIAGNOSIS — K21.9 GASTRO-ESOPHAGEAL REFLUX DISEASE WITHOUT ESOPHAGITIS: ICD-10-CM

## 2023-02-12 DIAGNOSIS — I48.0 PAROXYSMAL ATRIAL FIBRILLATION: ICD-10-CM

## 2023-02-12 DIAGNOSIS — Z86.2 PERSONAL HISTORY OF DISEASES OF THE BLOOD AND BLOOD-FORMING ORGANS AND CERTAIN DISORDERS INVOLVING THE IMMUNE MECHANISM: ICD-10-CM

## 2023-02-12 DIAGNOSIS — I10 ESSENTIAL (PRIMARY) HYPERTENSION: ICD-10-CM

## 2023-02-15 NOTE — ADDENDUM
[FreeTextEntry1] : Prema VICTORIA assisted in documentation on 02/15/2023 acting as a scribe for Dr. Grabiel Rodriguez.\par

## 2023-02-15 NOTE — ASSESSMENT
[FreeTextEntry1] : ## Paroxysmal AF s/p amulet \par ## GI bleeds\par \par - Patient continued to take Eliquis as she was concerned that she needed to take that. \par - We clarified  that she can stop Eliquis after the amulet device. We will stop Eliquis. \par - We will start her on Aspirin and Plavix. \par - JULIA to be schedule in the next few days with Dr. Sotelo. \par - Continue Metoprolol for now for the management of AF after amulet has healed completely. For now, we will try to manage medically. \par - Discussed importance of risk factor management.\par - Sleep study/Management of PRISCA\par - Diet/exercise/weight loss\par - Management of GERD if present\par - Return in 2 months.

## 2023-02-15 NOTE — HISTORY OF PRESENT ILLNESS
[FreeTextEntry1] : Ms. Masters is a 67 year-old female with hx of HTN, paroxysmal AF, multiple GI bleeds in the past, PRISCA on CPAP, paroxysmal AF s/p amulet device, is here for follow-up. \par \par + Palpitations. \par \par 02/09/2023: Feels better. \par \par Denies chest pain, shortness of breath, palpitation, dizziness or LOC except noted above.\par \par \par EKG (02/09/2023): SR

## 2023-02-16 RX ORDER — METOPROLOL SUCCINATE 25 MG/1
25 TABLET, EXTENDED RELEASE ORAL DAILY
Qty: 90 | Refills: 3 | Status: ACTIVE | COMMUNITY
Start: 2022-12-07 | End: 1900-01-01

## 2023-03-02 ENCOUNTER — OUTPATIENT (OUTPATIENT)
Dept: OUTPATIENT SERVICES | Facility: HOSPITAL | Age: 68
LOS: 1 days | End: 2023-03-02
Payer: MEDICARE

## 2023-03-02 VITALS
RESPIRATION RATE: 18 BRPM | WEIGHT: 188.94 LBS | SYSTOLIC BLOOD PRESSURE: 131 MMHG | TEMPERATURE: 98 F | DIASTOLIC BLOOD PRESSURE: 60 MMHG | HEART RATE: 74 BPM | OXYGEN SATURATION: 100 % | HEIGHT: 61 IN

## 2023-03-02 DIAGNOSIS — Z90.49 ACQUIRED ABSENCE OF OTHER SPECIFIED PARTS OF DIGESTIVE TRACT: Chronic | ICD-10-CM

## 2023-03-02 DIAGNOSIS — Z01.818 ENCOUNTER FOR OTHER PREPROCEDURAL EXAMINATION: ICD-10-CM

## 2023-03-02 DIAGNOSIS — Z87.59 PERSONAL HISTORY OF OTHER COMPLICATIONS OF PREGNANCY, CHILDBIRTH AND THE PUERPERIUM: Chronic | ICD-10-CM

## 2023-03-02 LAB
ALBUMIN SERPL ELPH-MCNC: 4.3 G/DL — SIGNIFICANT CHANGE UP (ref 3.5–5.2)
ALP SERPL-CCNC: 60 U/L — SIGNIFICANT CHANGE UP (ref 30–115)
ALT FLD-CCNC: 15 U/L — SIGNIFICANT CHANGE UP (ref 0–41)
ANION GAP SERPL CALC-SCNC: 14 MMOL/L — SIGNIFICANT CHANGE UP (ref 7–14)
APTT BLD: 36.4 SEC — SIGNIFICANT CHANGE UP (ref 27–39.2)
AST SERPL-CCNC: 22 U/L — SIGNIFICANT CHANGE UP (ref 0–41)
BASOPHILS # BLD AUTO: 0.05 K/UL — SIGNIFICANT CHANGE UP (ref 0–0.2)
BASOPHILS NFR BLD AUTO: 1.1 % — HIGH (ref 0–1)
BILIRUB SERPL-MCNC: 0.7 MG/DL — SIGNIFICANT CHANGE UP (ref 0.2–1.2)
BUN SERPL-MCNC: 12 MG/DL — SIGNIFICANT CHANGE UP (ref 10–20)
CALCIUM SERPL-MCNC: 9.6 MG/DL — SIGNIFICANT CHANGE UP (ref 8.4–10.5)
CHLORIDE SERPL-SCNC: 100 MMOL/L — SIGNIFICANT CHANGE UP (ref 98–110)
CO2 SERPL-SCNC: 28 MMOL/L — SIGNIFICANT CHANGE UP (ref 17–32)
CREAT SERPL-MCNC: 0.7 MG/DL — SIGNIFICANT CHANGE UP (ref 0.7–1.5)
EGFR: 95 ML/MIN/1.73M2 — SIGNIFICANT CHANGE UP
EOSINOPHIL # BLD AUTO: 0.06 K/UL — SIGNIFICANT CHANGE UP (ref 0–0.7)
EOSINOPHIL NFR BLD AUTO: 1.3 % — SIGNIFICANT CHANGE UP (ref 0–8)
GLUCOSE SERPL-MCNC: 94 MG/DL — SIGNIFICANT CHANGE UP (ref 70–99)
HCT VFR BLD CALC: 28.3 % — LOW (ref 37–47)
HGB BLD-MCNC: 8.5 G/DL — LOW (ref 12–16)
IMM GRANULOCYTES NFR BLD AUTO: 0.2 % — SIGNIFICANT CHANGE UP (ref 0.1–0.3)
INR BLD: 0.99 RATIO — SIGNIFICANT CHANGE UP (ref 0.65–1.3)
LYMPHOCYTES # BLD AUTO: 1.09 K/UL — LOW (ref 1.2–3.4)
LYMPHOCYTES # BLD AUTO: 23.7 % — SIGNIFICANT CHANGE UP (ref 20.5–51.1)
MCHC RBC-ENTMCNC: 23 PG — LOW (ref 27–31)
MCHC RBC-ENTMCNC: 30 G/DL — LOW (ref 32–37)
MCV RBC AUTO: 76.5 FL — LOW (ref 81–99)
MONOCYTES # BLD AUTO: 0.29 K/UL — SIGNIFICANT CHANGE UP (ref 0.1–0.6)
MONOCYTES NFR BLD AUTO: 6.3 % — SIGNIFICANT CHANGE UP (ref 1.7–9.3)
NEUTROPHILS # BLD AUTO: 3.1 K/UL — SIGNIFICANT CHANGE UP (ref 1.4–6.5)
NEUTROPHILS NFR BLD AUTO: 67.4 % — SIGNIFICANT CHANGE UP (ref 42.2–75.2)
NRBC # BLD: 0 /100 WBCS — SIGNIFICANT CHANGE UP (ref 0–0)
PLATELET # BLD AUTO: 184 K/UL — SIGNIFICANT CHANGE UP (ref 130–400)
POTASSIUM SERPL-MCNC: 3.8 MMOL/L — SIGNIFICANT CHANGE UP (ref 3.5–5)
POTASSIUM SERPL-SCNC: 3.8 MMOL/L — SIGNIFICANT CHANGE UP (ref 3.5–5)
PROT SERPL-MCNC: 7.2 G/DL — SIGNIFICANT CHANGE UP (ref 6–8)
PROTHROM AB SERPL-ACNC: 11.3 SEC — SIGNIFICANT CHANGE UP (ref 9.95–12.87)
RBC # BLD: 3.7 M/UL — LOW (ref 4.2–5.4)
RBC # FLD: 16.4 % — HIGH (ref 11.5–14.5)
SODIUM SERPL-SCNC: 142 MMOL/L — SIGNIFICANT CHANGE UP (ref 135–146)
WBC # BLD: 4.6 K/UL — LOW (ref 4.8–10.8)
WBC # FLD AUTO: 4.6 K/UL — LOW (ref 4.8–10.8)

## 2023-03-02 PROCEDURE — 36415 COLL VENOUS BLD VENIPUNCTURE: CPT

## 2023-03-02 PROCEDURE — 85025 COMPLETE CBC W/AUTO DIFF WBC: CPT

## 2023-03-02 PROCEDURE — 80053 COMPREHEN METABOLIC PANEL: CPT

## 2023-03-02 PROCEDURE — 85610 PROTHROMBIN TIME: CPT

## 2023-03-02 PROCEDURE — 99214 OFFICE O/P EST MOD 30 MIN: CPT | Mod: 25

## 2023-03-02 PROCEDURE — 93010 ELECTROCARDIOGRAM REPORT: CPT

## 2023-03-02 PROCEDURE — 85730 THROMBOPLASTIN TIME PARTIAL: CPT

## 2023-03-02 PROCEDURE — 93005 ELECTROCARDIOGRAM TRACING: CPT

## 2023-03-02 RX ORDER — DEXLANSOPRAZOLE 30 MG/1
1 CAPSULE, DELAYED RELEASE ORAL
Qty: 0 | Refills: 0 | DISCHARGE

## 2023-03-02 NOTE — H&P PST ADULT - NS HP PST LATEX ALLERGY
Optimize BG readings.   See above.   On gabapentin    Educated patient to check feet daily for any foreign objects and/or wounds. Discussed with patient the importance of wearing appropriate footwear at all times, not to walk barefoot ever, and to check shoes before putting them on feet. Instructed patient to keep feet dry by regularly changing shoes and socks and drying feet after baths and exercises. Also, instructed patient to report any new lesions, discolorations, or swelling to a healthcare professional.       No

## 2023-03-02 NOTE — H&P PST ADULT - HISTORY OF PRESENT ILLNESS
Patient is a 67 year old female presenting to PAST in preparation for  JULIA POST AMULET on 3/14 under  sedation anesthesia by   Reports h/o previous amulet in jan 2023 , scheduled for follow up  PATIENT CURRENTLY DENIES CHEST PAIN  SHORTNESS OF BREATH  PALPITATIONS,  DYSURIA, OR UPPER RESPIRATORY INFECTION IN PAST 2 WEEKS  EXERCISE  TOLERANCE  1-2 FLIGHT OF STAIRS  WITHOUT SHORTNESS OF BREATH    Anesthesia Alert  NO--Difficult Airway  NO--History of neck surgery or radiation  NO--Limited ROM of neck  NO--History of Malignant hyperthermia  NO--Personal or family history of Pseudocholinesterase deficiency  NO--Prior Anesthesia Complication  NO--Latex Allergy  NO--Loose teeth  NO--History of Rheumatoid Arthritis  yes--PRISCA ( does not use c-pap)  yes-- BLEEDING RISK( takes plavix)  NO--Other_____    As per patient, this is their complete medical and surgical history, including medications both prescribed or over the counter.  Patient verbalized understanding of instructions and was given the opportunity to ask questions and have them answered.  Patient denies any signs or symptoms of COVID 19 and denies contact with known positive individuals.  They have an appointment for  COVID testing pre-procedure and acknowledge its time and place.  They were instructed to quarantine pre-procedure, practice exposure control measures, continue to self-monitor and report any concerns to their proceduralist.

## 2023-03-03 DIAGNOSIS — Z01.818 ENCOUNTER FOR OTHER PREPROCEDURAL EXAMINATION: ICD-10-CM

## 2023-03-11 ENCOUNTER — LABORATORY RESULT (OUTPATIENT)
Age: 68
End: 2023-03-11

## 2023-08-11 ENCOUNTER — APPOINTMENT (OUTPATIENT)
Dept: ORTHOPEDIC SURGERY | Facility: CLINIC | Age: 68
End: 2023-08-11
Payer: MEDICARE

## 2023-08-11 DIAGNOSIS — M17.12 UNILATERAL PRIMARY OSTEOARTHRITIS, LEFT KNEE: ICD-10-CM

## 2023-08-11 PROCEDURE — 73560 X-RAY EXAM OF KNEE 1 OR 2: CPT | Mod: 50

## 2023-08-11 PROCEDURE — 20610 DRAIN/INJ JOINT/BURSA W/O US: CPT | Mod: LT

## 2023-08-11 PROCEDURE — 99203 OFFICE O/P NEW LOW 30 MIN: CPT | Mod: 25

## 2023-08-11 NOTE — DATA REVIEWED
[FreeTextEntry1] : X-ray bilateral knees for comparison: No acute fractures consultation constipation.  Right knee surgical hardware intact good position.  Left knee advanced osteoarthritis with bone-on-bone medial joint narrowing.

## 2023-08-11 NOTE — HISTORY OF PRESENT ILLNESS
[de-identified] : Patient is a 60-year-old female here for evaluation of left knee.  Patient states she has been having left knee pain for a long period of time but has been significantly worsening over the past 2 weeks with no injury or trauma.  Patient has started to use a cane for ambulation.  Pain worsens with ambulation, going up and down stairs, standing long peers of time.  Denies numbness/tingling.

## 2023-08-11 NOTE — PHYSICAL EXAM
[Left] : left knee [5___] : hamstring 5[unfilled]/5 [Negative] : negative Josy's [] : light touch is intact throughout [TWNoteComboBox7] : flexion 110 degrees [de-identified] : extension 0 degrees

## 2023-08-11 NOTE — DISCUSSION/SUMMARY
[de-identified] : Discussed with patient detail that she has advanced osteoarthritis of left knee.  Discussed treatment option detail including rest, ice/heat, Tylenol, range of motion exercise, physical therapy, cortisone injection, total knee replaced surgery.  Patient agreed to cortisone injection left knee.  Dexamethasone and Lidocaine    Anesthesia: ethyl chloride sprayed topically. Dexamethasone 10mg/1mL 2 cc.    Xylocaine 1%: 2 cc.        Medication was injected in the LT knee after verbal consent using sterile preparation and technique. The risks, benefits, and alternatives to cortisone injection were explained in full to the patient. Risks outlined include but are not limited to infection, sepsis, bleeding, scarring, skin discoloration, temporary increase in pain, syncopal episode, failure to resolve symptoms, allergic reaction, symptom recurrence, and elevation of blood sugar in diabetics. Patient understood the risks. All questions were answered. After discussion of options, patient requested an injection. Oral informed consent was obtained and sterile prep was done of the injection site. Sterile technique was utilized for the procedure including the preparation of the solutions used for the injection. Patient tolerated the procedure well. Advised to ice the injection site this evening.   Follow-up in 3 to 4 months for reevaluation, possible surgical consult, patient understands agrees with plan.

## 2023-09-05 ENCOUNTER — APPOINTMENT (OUTPATIENT)
Dept: ORTHOPEDIC SURGERY | Facility: CLINIC | Age: 68
End: 2023-09-05
Payer: MEDICARE

## 2023-09-05 PROCEDURE — 99214 OFFICE O/P EST MOD 30 MIN: CPT

## 2023-09-05 NOTE — HISTORY OF PRESENT ILLNESS
[de-identified] : Left knee pain secondary to OA had injection on prior visit without improvement prior right tka doing well now plans to move forward wth a left tka. Mechanical pain made worse with activity, not completely improved with rest, interfering with ADLs. At this point nonoperative management of the symptoms is ineffective and the patient has interest in moving forward with a joint replacement.  Past medical history is on the chart for review and as mentioned above.  ROS is negative for fever, chills, CP, SOB, unexplained weight loss or gain.    LEFT KNEE EXAM Knee is painful with PROM and limited in flexion and extension by guarding. Crepitation with ROM +JLT, TTP over the femoral condyles +patella grind test There is significant guarding throughout the exam limiting ROM testing. AROM demonstrated is 2-100 NVID No gross instability noted but exam limited by pain/guarding.    Imaging: X-rays AP and Lateral weight bearing views of the knees shows advanced degenerative changes including loss of the joint space with bone to bone apposition, osteophytes, subchondral sclerosis and cystic changes of the left knee.   Assessment: Severe left knee arthritis.   Plan is for a left  total knee replacement. The surgery, preoperative workup, surgical procedure, hospital course, post operative course, rehab, and expected outcomes were discussed. A description of the surgery in layman's terms, using models equivalent to the implants used during surgery, was a part of this conversation. The risks and benefits of the surgery were discussed.  Benefits  were described as improvement in pain and function.  Risks including bleeding, infection, blood clots, DVT, PE, stroke, heart attack, fracture, loss of motion, arthrofibrosis, implant loosening, instability, nerve palsy, neurovascular injury, persistent pain, RSD, and in rare cases death. Should the implant not function as expected or wear out then revision surgery may be required in the future.   We will proceed with scheduling the procedure.

## 2023-09-21 ASSESSMENT — KOOS JR
BENDING TO THE FLOOR TO PICK UP OBJECT: EXTREME
GOING UP OR DOWN STAIRS: SEVERE
STRAIGHTENING KNEE FULLY: EXTREME
IMPORTED FORM: YES
STANDING UPRIGHT: EXTREME
IMPORTED KOOS JR SCORE: 25.0
RISING FROM SITTING: EXTREME
TWISING OR PIVOTING ON KNEE: SEVERE
HOW SEVERE IS YOUR KNEE STIFFNESS AFTER FIRST WAKING IN MORNING: SEVERE
KOOS JR RAW SCORE: 25

## 2023-10-26 PROBLEM — Z00.00 ENCOUNTER FOR PREVENTIVE HEALTH EXAMINATION: Status: ACTIVE | Noted: 2023-10-26

## 2023-11-03 RX ORDER — CELECOXIB 200 MG/1
200 CAPSULE ORAL DAILY
Refills: 0 | Status: ACTIVE | COMMUNITY

## 2023-11-03 RX ORDER — ENALAPRIL MALEATE AND HYDROCHLOROTHIAZIDE 5; 12.5 MG/1; MG/1
5-12.5 TABLET ORAL DAILY
Refills: 0 | Status: ACTIVE | COMMUNITY

## 2023-11-03 RX ORDER — METOPROLOL SUCCINATE 25 MG/1
25 TABLET, EXTENDED RELEASE ORAL DAILY
Refills: 0 | Status: ACTIVE | COMMUNITY

## 2023-11-03 RX ORDER — ESCITALOPRAM OXALATE 20 MG/1
20 TABLET, FILM COATED ORAL DAILY
Refills: 0 | Status: ACTIVE | COMMUNITY

## 2023-11-03 RX ORDER — ASPIRIN 81 MG/1
81 TABLET, COATED ORAL DAILY
Refills: 0 | Status: ACTIVE | COMMUNITY

## 2023-11-03 RX ORDER — LORAZEPAM 0.5 MG/1
0.5 TABLET ORAL
Refills: 0 | Status: ACTIVE | COMMUNITY

## 2023-11-06 ENCOUNTER — APPOINTMENT (OUTPATIENT)
Dept: CARDIOLOGY | Facility: CLINIC | Age: 68
End: 2023-11-06

## 2023-11-06 ENCOUNTER — APPOINTMENT (OUTPATIENT)
Dept: CARDIOLOGY | Facility: CLINIC | Age: 68
End: 2023-11-06
Payer: MEDICARE

## 2023-11-06 VITALS
SYSTOLIC BLOOD PRESSURE: 120 MMHG | HEART RATE: 70 BPM | HEIGHT: 63 IN | DIASTOLIC BLOOD PRESSURE: 72 MMHG | BODY MASS INDEX: 32.78 KG/M2 | WEIGHT: 185 LBS

## 2023-11-06 DIAGNOSIS — Z95.818 PRESENCE OF OTHER CARDIAC IMPLANTS AND GRAFTS: ICD-10-CM

## 2023-11-06 DIAGNOSIS — I10 ESSENTIAL (PRIMARY) HYPERTENSION: ICD-10-CM

## 2023-11-06 DIAGNOSIS — E78.5 HYPERLIPIDEMIA, UNSPECIFIED: ICD-10-CM

## 2023-11-06 DIAGNOSIS — I48.91 UNSPECIFIED ATRIAL FIBRILLATION: ICD-10-CM

## 2023-11-06 PROCEDURE — 99204 OFFICE O/P NEW MOD 45 MIN: CPT | Mod: 25

## 2023-11-06 PROCEDURE — 93000 ELECTROCARDIOGRAM COMPLETE: CPT

## 2023-12-13 ENCOUNTER — APPOINTMENT (OUTPATIENT)
Dept: ORTHOPEDIC SURGERY | Facility: HOSPITAL | Age: 68
End: 2023-12-13

## 2023-12-30 ENCOUNTER — EMERGENCY (EMERGENCY)
Facility: HOSPITAL | Age: 68
LOS: 0 days | Discharge: HOME | End: 2023-12-30
Attending: EMERGENCY MEDICINE
Payer: MEDICARE

## 2023-12-30 VITALS
HEART RATE: 87 BPM | RESPIRATION RATE: 18 BRPM | WEIGHT: 190.04 LBS | SYSTOLIC BLOOD PRESSURE: 152 MMHG | DIASTOLIC BLOOD PRESSURE: 80 MMHG | HEIGHT: 62 IN | TEMPERATURE: 98 F | OXYGEN SATURATION: 99 %

## 2023-12-30 VITALS
OXYGEN SATURATION: 95 % | TEMPERATURE: 98 F | HEART RATE: 89 BPM | RESPIRATION RATE: 18 BRPM | SYSTOLIC BLOOD PRESSURE: 198 MMHG | DIASTOLIC BLOOD PRESSURE: 96 MMHG

## 2023-12-30 DIAGNOSIS — Z79.82 LONG TERM (CURRENT) USE OF ASPIRIN: ICD-10-CM

## 2023-12-30 DIAGNOSIS — Z79.01 LONG TERM (CURRENT) USE OF ANTICOAGULANTS: ICD-10-CM

## 2023-12-30 DIAGNOSIS — I48.0 PAROXYSMAL ATRIAL FIBRILLATION: ICD-10-CM

## 2023-12-30 DIAGNOSIS — R05.1 ACUTE COUGH: ICD-10-CM

## 2023-12-30 DIAGNOSIS — R50.9 FEVER, UNSPECIFIED: ICD-10-CM

## 2023-12-30 DIAGNOSIS — Z87.59 PERSONAL HISTORY OF OTHER COMPLICATIONS OF PREGNANCY, CHILDBIRTH AND THE PUERPERIUM: Chronic | ICD-10-CM

## 2023-12-30 DIAGNOSIS — R07.89 OTHER CHEST PAIN: ICD-10-CM

## 2023-12-30 DIAGNOSIS — Z90.49 ACQUIRED ABSENCE OF OTHER SPECIFIED PARTS OF DIGESTIVE TRACT: Chronic | ICD-10-CM

## 2023-12-30 DIAGNOSIS — Z90.49 ACQUIRED ABSENCE OF OTHER SPECIFIED PARTS OF DIGESTIVE TRACT: ICD-10-CM

## 2023-12-30 DIAGNOSIS — Z88.0 ALLERGY STATUS TO PENICILLIN: ICD-10-CM

## 2023-12-30 DIAGNOSIS — G47.33 OBSTRUCTIVE SLEEP APNEA (ADULT) (PEDIATRIC): ICD-10-CM

## 2023-12-30 DIAGNOSIS — I10 ESSENTIAL (PRIMARY) HYPERTENSION: ICD-10-CM

## 2023-12-30 LAB
ALBUMIN SERPL ELPH-MCNC: 4 G/DL — SIGNIFICANT CHANGE UP (ref 3.5–5.2)
ALBUMIN SERPL ELPH-MCNC: 4 G/DL — SIGNIFICANT CHANGE UP (ref 3.5–5.2)
ALP SERPL-CCNC: 52 U/L — SIGNIFICANT CHANGE UP (ref 30–115)
ALP SERPL-CCNC: 52 U/L — SIGNIFICANT CHANGE UP (ref 30–115)
ALT FLD-CCNC: 17 U/L — SIGNIFICANT CHANGE UP (ref 0–41)
ALT FLD-CCNC: 17 U/L — SIGNIFICANT CHANGE UP (ref 0–41)
ANION GAP SERPL CALC-SCNC: 9 MMOL/L — SIGNIFICANT CHANGE UP (ref 7–14)
ANION GAP SERPL CALC-SCNC: 9 MMOL/L — SIGNIFICANT CHANGE UP (ref 7–14)
AST SERPL-CCNC: 33 U/L — SIGNIFICANT CHANGE UP (ref 0–41)
AST SERPL-CCNC: 33 U/L — SIGNIFICANT CHANGE UP (ref 0–41)
BILIRUB SERPL-MCNC: 0.3 MG/DL — SIGNIFICANT CHANGE UP (ref 0.2–1.2)
BILIRUB SERPL-MCNC: 0.3 MG/DL — SIGNIFICANT CHANGE UP (ref 0.2–1.2)
BUN SERPL-MCNC: 18 MG/DL — SIGNIFICANT CHANGE UP (ref 10–20)
BUN SERPL-MCNC: 18 MG/DL — SIGNIFICANT CHANGE UP (ref 10–20)
CALCIUM SERPL-MCNC: 9.2 MG/DL — SIGNIFICANT CHANGE UP (ref 8.4–10.5)
CALCIUM SERPL-MCNC: 9.2 MG/DL — SIGNIFICANT CHANGE UP (ref 8.4–10.5)
CHLORIDE SERPL-SCNC: 108 MMOL/L — SIGNIFICANT CHANGE UP (ref 98–110)
CHLORIDE SERPL-SCNC: 108 MMOL/L — SIGNIFICANT CHANGE UP (ref 98–110)
CO2 SERPL-SCNC: 26 MMOL/L — SIGNIFICANT CHANGE UP (ref 17–32)
CO2 SERPL-SCNC: 26 MMOL/L — SIGNIFICANT CHANGE UP (ref 17–32)
CREAT SERPL-MCNC: 0.7 MG/DL — SIGNIFICANT CHANGE UP (ref 0.7–1.5)
CREAT SERPL-MCNC: 0.7 MG/DL — SIGNIFICANT CHANGE UP (ref 0.7–1.5)
EGFR: 94 ML/MIN/1.73M2 — SIGNIFICANT CHANGE UP
EGFR: 94 ML/MIN/1.73M2 — SIGNIFICANT CHANGE UP
GLUCOSE SERPL-MCNC: 109 MG/DL — HIGH (ref 70–99)
GLUCOSE SERPL-MCNC: 109 MG/DL — HIGH (ref 70–99)
HCT VFR BLD CALC: 32.5 % — LOW (ref 37–47)
HCT VFR BLD CALC: 32.5 % — LOW (ref 37–47)
HGB BLD-MCNC: 10.2 G/DL — LOW (ref 12–16)
HGB BLD-MCNC: 10.2 G/DL — LOW (ref 12–16)
LIDOCAIN IGE QN: 54 U/L — SIGNIFICANT CHANGE UP (ref 7–60)
LIDOCAIN IGE QN: 54 U/L — SIGNIFICANT CHANGE UP (ref 7–60)
MAGNESIUM SERPL-MCNC: 2.2 MG/DL — SIGNIFICANT CHANGE UP (ref 1.8–2.4)
MAGNESIUM SERPL-MCNC: 2.2 MG/DL — SIGNIFICANT CHANGE UP (ref 1.8–2.4)
MCHC RBC-ENTMCNC: 25.7 PG — LOW (ref 27–31)
MCHC RBC-ENTMCNC: 25.7 PG — LOW (ref 27–31)
MCHC RBC-ENTMCNC: 31.4 G/DL — LOW (ref 32–37)
MCHC RBC-ENTMCNC: 31.4 G/DL — LOW (ref 32–37)
MCV RBC AUTO: 81.9 FL — SIGNIFICANT CHANGE UP (ref 81–99)
MCV RBC AUTO: 81.9 FL — SIGNIFICANT CHANGE UP (ref 81–99)
NRBC # BLD: 0 /100 WBCS — SIGNIFICANT CHANGE UP (ref 0–0)
NRBC # BLD: 0 /100 WBCS — SIGNIFICANT CHANGE UP (ref 0–0)
PLATELET # BLD AUTO: 140 K/UL — SIGNIFICANT CHANGE UP (ref 130–400)
PLATELET # BLD AUTO: 140 K/UL — SIGNIFICANT CHANGE UP (ref 130–400)
PMV BLD: 12.1 FL — HIGH (ref 7.4–10.4)
PMV BLD: 12.1 FL — HIGH (ref 7.4–10.4)
POTASSIUM SERPL-MCNC: 4.2 MMOL/L — SIGNIFICANT CHANGE UP (ref 3.5–5)
POTASSIUM SERPL-MCNC: 4.2 MMOL/L — SIGNIFICANT CHANGE UP (ref 3.5–5)
POTASSIUM SERPL-SCNC: 4.2 MMOL/L — SIGNIFICANT CHANGE UP (ref 3.5–5)
POTASSIUM SERPL-SCNC: 4.2 MMOL/L — SIGNIFICANT CHANGE UP (ref 3.5–5)
PROT SERPL-MCNC: 6.9 G/DL — SIGNIFICANT CHANGE UP (ref 6–8)
PROT SERPL-MCNC: 6.9 G/DL — SIGNIFICANT CHANGE UP (ref 6–8)
RBC # BLD: 3.97 M/UL — LOW (ref 4.2–5.4)
RBC # BLD: 3.97 M/UL — LOW (ref 4.2–5.4)
RBC # FLD: 18.3 % — HIGH (ref 11.5–14.5)
RBC # FLD: 18.3 % — HIGH (ref 11.5–14.5)
SODIUM SERPL-SCNC: 143 MMOL/L — SIGNIFICANT CHANGE UP (ref 135–146)
SODIUM SERPL-SCNC: 143 MMOL/L — SIGNIFICANT CHANGE UP (ref 135–146)
TROPONIN T, HIGH SENSITIVITY RESULT: 13 NG/L — SIGNIFICANT CHANGE UP (ref 6–13)
TROPONIN T, HIGH SENSITIVITY RESULT: 13 NG/L — SIGNIFICANT CHANGE UP (ref 6–13)
TROPONIN T, HIGH SENSITIVITY RESULT: 8 NG/L — SIGNIFICANT CHANGE UP (ref 6–13)
TROPONIN T, HIGH SENSITIVITY RESULT: 8 NG/L — SIGNIFICANT CHANGE UP (ref 6–13)
WBC # BLD: 5.66 K/UL — SIGNIFICANT CHANGE UP (ref 4.8–10.8)
WBC # BLD: 5.66 K/UL — SIGNIFICANT CHANGE UP (ref 4.8–10.8)
WBC # FLD AUTO: 5.66 K/UL — SIGNIFICANT CHANGE UP (ref 4.8–10.8)
WBC # FLD AUTO: 5.66 K/UL — SIGNIFICANT CHANGE UP (ref 4.8–10.8)

## 2023-12-30 PROCEDURE — 96374 THER/PROPH/DIAG INJ IV PUSH: CPT

## 2023-12-30 PROCEDURE — 71045 X-RAY EXAM CHEST 1 VIEW: CPT | Mod: 26

## 2023-12-30 PROCEDURE — 83735 ASSAY OF MAGNESIUM: CPT

## 2023-12-30 PROCEDURE — 83690 ASSAY OF LIPASE: CPT

## 2023-12-30 PROCEDURE — 71045 X-RAY EXAM CHEST 1 VIEW: CPT

## 2023-12-30 PROCEDURE — 36415 COLL VENOUS BLD VENIPUNCTURE: CPT

## 2023-12-30 PROCEDURE — 80053 COMPREHEN METABOLIC PANEL: CPT

## 2023-12-30 PROCEDURE — 84484 ASSAY OF TROPONIN QUANT: CPT

## 2023-12-30 PROCEDURE — 85027 COMPLETE CBC AUTOMATED: CPT

## 2023-12-30 PROCEDURE — 93005 ELECTROCARDIOGRAM TRACING: CPT

## 2023-12-30 PROCEDURE — 99285 EMERGENCY DEPT VISIT HI MDM: CPT

## 2023-12-30 PROCEDURE — 93010 ELECTROCARDIOGRAM REPORT: CPT | Mod: 76

## 2023-12-30 PROCEDURE — 99285 EMERGENCY DEPT VISIT HI MDM: CPT | Mod: 25

## 2023-12-30 RX ORDER — SODIUM CHLORIDE 9 MG/ML
1000 INJECTION, SOLUTION INTRAVENOUS ONCE
Refills: 0 | Status: COMPLETED | OUTPATIENT
Start: 2023-12-30 | End: 2023-12-30

## 2023-12-30 RX ORDER — ACETAMINOPHEN 500 MG
650 TABLET ORAL ONCE
Refills: 0 | Status: COMPLETED | OUTPATIENT
Start: 2023-12-30 | End: 2023-12-30

## 2023-12-30 RX ORDER — KETOROLAC TROMETHAMINE 30 MG/ML
15 SYRINGE (ML) INJECTION ONCE
Refills: 0 | Status: DISCONTINUED | OUTPATIENT
Start: 2023-12-30 | End: 2023-12-30

## 2023-12-30 RX ADMIN — Medication 15 MILLIGRAM(S): at 07:49

## 2023-12-30 RX ADMIN — SODIUM CHLORIDE 1000 MILLILITER(S): 9 INJECTION, SOLUTION INTRAVENOUS at 07:50

## 2023-12-30 RX ADMIN — Medication 650 MILLIGRAM(S): at 07:49

## 2023-12-30 NOTE — ED ADULT TRIAGE NOTE - GLASGOW COMA SCALE: BEST VERBAL RESPONSE, MLM
No douching/No tampons/Nothing per vagina/No tub baths/No intercourse/No heavy lifting (V5) oriented

## 2023-12-30 NOTE — ED PROVIDER NOTE - DIFFERENTIAL DIAGNOSIS
Differential Diagnosis stemi, nstemi, arrythmia, pna, ptx, metabolic vs infectious etiology, viral illness

## 2023-12-30 NOTE — ED PROVIDER NOTE - PATIENT PORTAL LINK FT
You can access the FollowMyHealth Patient Portal offered by [facility] by registering at the following website: [email or web address]. By joining [employer]’s FollowMyHealth portal, you will also be able to view your health information using other applications (apps) compatible with our system. You can access the FollowMyHealth Patient Portal offered by [facility] by registering at the following website: [email or web address]. By joining [employer]’s FollowMyHealth portal, you will also be able to view your health information using other applications (apps) compatible with our system.

## 2023-12-30 NOTE — ED ADULT NURSE NOTE - SUICIDE SCREENING QUESTION 3
Ms. Monica Richardson is a 85 y.o. female with history of CVA who presents to the ER for evaluation of syncope. History is obtained from family who is at bedside.  They mention that this morning, she wokeup and had breakfast like normal.  She went outside to walk, and then ended up falling.  She does not recall how she fell, but reports not feeling dizzy or having palpitations prior.  She did have a small glass of wine later in the morning.  She endorses pains in her right leg, but thinks its arthritis.  She doesn't use any DME for ambulation.  She does not think she hit her head, but she is on Eliquis.  She denies any chest pain, shortness of breath, fever or chills.  She denies any dysuria, increased frequency, fever or chills.  Of note, she was just hospitalized last month for a UTI and culture was negative.     Upon arrival to the ER, vitals were temp 97.9F, HR 54 and /70.  Labs were stable.  UA was dirty.  Head CT was negative.  She was given Ceftriaxone and was admitted to Hospital Medicine for further management.  
No

## 2023-12-30 NOTE — ED PROVIDER NOTE - OBJECTIVE STATEMENT
68-year-old female with PMH of HTN, PRISCA, A-fib on Eliquis s/p amulet, diverticulitis with GI bleeding, anemia, cholecystectomy, appendectomy, presents ED for evaluation of substernal chest pain since 3 AM.  Patient reports over the last few days she has had subjective fever/chills and myalgias, dry cough.  Woke this morning at 3 AM with substernal chest pain she describes a squeezing sensation that is intermittent, radiates to her left arm, without modifying or relieving factors.  Denies SOB, palpitations, abdominal pain, N/V/D, urinary symptoms, lower extremity swelling or pain.

## 2023-12-30 NOTE — ED ADULT NURSE NOTE - OBJECTIVE STATEMENT
pt c/o midsternal chest pain along with epigastric pain that started at 3AM. Pt A+Ox4 and ambulatory at baseline.

## 2023-12-30 NOTE — ED PROVIDER NOTE - PROGRESS NOTE DETAILS
ED Attending ELMER Ayala  Patient feeling better after meds, refused flu/COVID swab, bedside ultrasound, only allowed blood work EKG chest x-ray, understands results, had a CCTA in 2022 with a CAD-RADS 0. Patient and daughter understand symptomatic treatment, signs and symptoms to return for, will follow-up with cardiology and patient's electrophysiologist Dr. Rodriguez.  Copy of all results provided

## 2023-12-30 NOTE — ED PROVIDER NOTE - CLINICAL SUMMARY MEDICAL DECISION MAKING FREE TEXT BOX
68-year-old female with past medical history of paroxysmal atrial fibrillation, on baby aspirin, anemia secondary to diverticulitis with lower GI bleed, left atrial appendage occlusion with amulet with Dr. Jennifer easton, presents with substernal chest wall pain that started at 3 AM, and in nature, intermittent, started to feel body aches but more to her left arm, mild to moderate intensity, associated with dry cough, chills, upper respiratory symptoms.  Daughter was diagnosed with flu a few weeks ago but states she was not around her mother.  Denies smoking.  No fever,  n/v, sob, pleuritic cp, palpitations, diaphoresis, sore throat, ear, pain, ha/lh/dizziness, numbness/tingling, neck pain/ stiffness, abd pain, diarrhea, constipation, melena/brbpr, urinary symptoms, trauma, weakness, edema, calf pain/swelling/erythema, sick contacts, recent travel or rash.   on exam:  non toxic appearing pt sitting on stretcher speaking full sentences, no rash, mmm, neck supple. non-tender , radial pulses 2/4 b/l, no jvd,  pain to palpation to  mid chest wall, (+) reproducible, ctabl w/ breath sounds present b/l, no wheezing or crackles no tachypnea, no stridor, bs present throughout all 4 quadrants, abd soft, nd, nt, no rebound tenderness or guarding, no cvat, FROM of ext, no calf pain/swelling/erythema, AAOx3. motor 5/5 and sensation intact throughout upper and lower ext. no focal deficits.    Plan: Monitor, EKG, CXR, labs, pain control, reassess.    Labs and EKG were ordered and reviewed.  Imaging was ordered and reviewed by me.  Appropriate medications for patient's presenting complaints were ordered and effects were reassessed.  Patient's records (prior hospital, ED visit, ) were reviewed.  Additional history was obtained from daughter. Shared decision making utilized and HEART score discussed.  Patient electing for outpatient management and will follow up immediately with cardiology.  Risk of MACE discussed and patient understands this risk. Strict return precautions discussed and patient aware they can return at any time for re-evaluation and possible admission. EKG and results discussed.

## 2023-12-30 NOTE — ED PROVIDER NOTE - EKG ADDITIONAL INFORMATION FREE TEXT
Normal sinus rhythm at 90 bpm, MN interval 148, QRS 80, QTc 437, no ST elevations or depressions. Normal sinus rhythm at 90 bpm, SD interval 148, QRS 80, QTc 437, no ST elevations or depressions.

## 2023-12-30 NOTE — ED PROVIDER NOTE - NSFOLLOWUPINSTRUCTIONS_ED_ALL_ED_FT
Our Emergency Department Referral Coordinators will be reaching out to you in the next 24-48 hours from 9:00am to 5:00pm with a follow up appointment. Please expect a phone call from the hospital in that time frame. If you do not receive a call or if you have any questions or concerns, you can reach them at   [phone].    PLEASE FOLLOW UP WITH DR. BEVERLY IN 1- 3 DAYS.     Chest Pain    Chest pain can be caused by many different conditions which may or may not be dangerous. Causes include heartburn, lung infections, heart attack, blood clot in lungs, skin infections, strain or damage to muscle, cartilage, or bones, etc. In addition to a history and physical examination, an electrocardiogram (ECG) or other lab tests may have been performed to determine the cause of your chest pain. Follow up with your primary care provider or with a cardiologist as instructed.     SEEK IMMEDIATE MEDICAL CARE IF YOU HAVE ANY OF THE FOLLOWING SYMPTOMS: worsening chest pain, coughing up blood, unexplained back/neck/jaw pain, severe abdominal pain, dizziness or lightheadedness, fainting, shortness of breath, sweaty or clammy skin, vomiting, or racing heart beat. These symptoms may represent a serious problem that is an emergency. Do not wait to see if the symptoms will go away. Get medical help right away. Call 911 and do not drive yourself to the hospital.    Viral Illness, Adult  Viruses are tiny germs that can get into a person's body and cause illness. There are many different types of viruses, and they cause many types of illness. Viral illnesses can range from mild to severe. They can affect various parts of the body.    Common illnesses that are caused by a virus include colds and the flu. Viral illnesses also include serious conditions such as HIV/AIDS (human immunodeficiency virus/acquired immunodeficiency syndrome). A few viruses have been linked to certain cancers.    What are the causes?  Many types of viruses can cause illness. Viruses invade cells in your body, multiply, and cause the infected cells to malfunction or die. When the cell dies, it releases more of the virus. When this happens, you develop symptoms of the illness, and the virus continues to spread to other cells. If the virus takes over the function of the cell, it can cause the cell to divide and grow out of control, as is the case when a virus causes cancer.    Different viruses get into the body in different ways. You can get a virus by:    Swallowing food or water that is contaminated with the virus.  Breathing in droplets that have been coughed or sneezed into the air by an infected person.  Touching a surface that has been contaminated with the virus and then touching your eyes, nose, or mouth.  Being bitten by an insect or animal that carries the virus.  Having sexual contact with a person who is infected with the virus.  Being exposed to blood or fluids that contain the virus, either through an open cut or during a transfusion.    If a virus enters your body, your body's defense system (immune system) will try to fight the virus. You may be at higher risk for a viral illness if your immune system is weak.    What are the signs or symptoms?  Symptoms vary depending on the type of virus and the location of the cells that it invades. Common symptoms of the main types of viral illnesses include:    Cold and flu viruses     Fever.  Headache.  Sore throat.  Muscle aches.  Nasal congestion.  Cough.  Digestive system (gastrointestinal) viruses     Fever.  Abdominal pain.  Nausea.  Diarrhea.  Liver viruses (hepatitis)     Loss of appetite.  Tiredness.  Yellowing of the skin (jaundice).  Brain and spinal cord viruses     Fever.  Headache.  Stiff neck.  Nausea and vomiting.  Confusion or sleepiness.  Skin viruses     Warts.  Itching.  Rash.  Sexually transmitted viruses     Discharge.  Swelling.  Redness.  Rash.  How is this treated?  Viruses can be difficult to treat because they live within cells. Antibiotic medicines do not treat viruses because these drugs do not get inside cells. Treatment for a viral illness may include:    Resting and drinking plenty of fluids.  Medicines to relieve symptoms. These can include over-the-counter medicine for pain and fever, medicines for cough or congestion, and medicines to relieve diarrhea.  Antiviral medicines. These drugs are available only for certain types of viruses. They may help reduce flu symptoms if taken early. There are also many antiviral medicines for hepatitis and HIV/AIDS.    Some viral illnesses can be prevented with vaccinations. A common example is the flu shot.    Follow these instructions at home:  Medicines     Image   Take over-the-counter and prescription medicines only as told by your health care provider.  If you were prescribed an antiviral medicine, take it as told by your health care provider. Do not stop taking the medicine even if you start to feel better.  Be aware of when antibiotics are needed and when they are not needed. Antibiotics do not treat viruses. If your health care provider thinks that you may have a bacterial infection as well as a viral infection, you may get an antibiotic.    Do not ask for an antibiotic prescription if you have been diagnosed with a viral illness. That will not make your illness go away faster.  Frequently taking antibiotics when they are not needed can lead to antibiotic resistance. When this develops, the medicine no longer works against the bacteria that it normally fights.    General instructions     Drink enough fluids to keep your urine clear or pale yellow.  Rest as much as possible.  Return to your normal activities as told by your health care provider. Ask your health care provider what activities are safe for you.  Keep all follow-up visits as told by your health care provider. This is important.  How is this prevented?  ImageTake these actions to reduce your risk of viral infection:    Eat a healthy diet and get enough rest.  Wash your hands often with soap and water. This is especially important when you are in public places. If soap and water are not available, use hand .  Avoid close contact with friends and family who have a viral illness.  If you travel to areas where viral gastrointestinal infection is common, avoid drinking water or eating raw food.  Keep your immunizations up to date. Get a flu shot every year as told by your health care provider.  Do not share toothbrushes, nail clippers, razors, or needles with other people.  Always practice safe sex.    Contact a health care provider if:  You have symptoms of a viral illness that do not go away.  Your symptoms come back after going away.  Your symptoms get worse.  Get help right away if:  You have trouble breathing.  You have a severe headache or a stiff neck.  You have severe vomiting or abdominal pain.  This information is not intended to replace advice given to you by your health care provider. Make sure you discuss any questions you have with your health care provider. Our Emergency Department Referral Coordinators will be reaching out to you in the next 24-48 hours from 9:00am to 5:00pm with a follow up appointment. Please expect a phone call from the hospital in that time frame. If you do not receive a call or if you have any questions or concerns, you can reach them at   [phone].    PLEASE FOLLOW UP WITH DR. BEVERLY IN 1- 3 DAYS.     Chest Pain    Chest pain can be caused by many different conditions which may or may not be dangerous. Causes include heartburn, lung infections, heart attack, blood clot in lungs, skin infections, strain or damage to muscle, cartilage, or bones, etc. In addition to a history and physical examination, an electrocardiogram (ECG) or other lab tests may have been performed to determine the cause of your chest pain. Follow up with your primary care provider or with a cardiologist as instructed.     SEEK IMMEDIATE MEDICAL CARE IF YOU HAVE ANY OF THE FOLLOWING SYMPTOMS: worsening chest pain, coughing up blood, unexplained back/neck/jaw pain, severe abdominal pain, dizziness or lightheadedness, fainting, shortness of breath, sweaty or clammy skin, vomiting, or racing heart beat. These symptoms may represent a serious problem that is an emergency. Do not wait to see if the symptoms will go away. Get medical help right away. Call 911 and do not drive yourself to the hospital.    Viral Illness, Adult  Viruses are tiny germs that can get into a person's body and cause illness. There are many different types of viruses, and they cause many types of illness. Viral illnesses can range from mild to severe. They can affect various parts of the body.    Common illnesses that are caused by a virus include colds and the flu. Viral illnesses also include serious conditions such as HIV/AIDS (human immunodeficiency virus/acquired immunodeficiency syndrome). A few viruses have been linked to certain cancers.    What are the causes?  Many types of viruses can cause illness. Viruses invade cells in your body, multiply, and cause the infected cells to malfunction or die. When the cell dies, it releases more of the virus. When this happens, you develop symptoms of the illness, and the virus continues to spread to other cells. If the virus takes over the function of the cell, it can cause the cell to divide and grow out of control, as is the case when a virus causes cancer.    Different viruses get into the body in different ways. You can get a virus by:    Swallowing food or water that is contaminated with the virus.  Breathing in droplets that have been coughed or sneezed into the air by an infected person.  Touching a surface that has been contaminated with the virus and then touching your eyes, nose, or mouth.  Being bitten by an insect or animal that carries the virus.  Having sexual contact with a person who is infected with the virus.  Being exposed to blood or fluids that contain the virus, either through an open cut or during a transfusion.    If a virus enters your body, your body's defense system (immune system) will try to fight the virus. You may be at higher risk for a viral illness if your immune system is weak.    What are the signs or symptoms?  Symptoms vary depending on the type of virus and the location of the cells that it invades. Common symptoms of the main types of viral illnesses include:    Cold and flu viruses     Fever.  Headache.  Sore throat.  Muscle aches.  Nasal congestion.  Cough.  Digestive system (gastrointestinal) viruses     Fever.  Abdominal pain.  Nausea.  Diarrhea.  Liver viruses (hepatitis)     Loss of appetite.  Tiredness.  Yellowing of the skin (jaundice).  Brain and spinal cord viruses     Fever.  Headache.  Stiff neck.  Nausea and vomiting.  Confusion or sleepiness.  Skin viruses     Warts.  Itching.  Rash.  Sexually transmitted viruses     Discharge.  Swelling.  Redness.  Rash.  How is this treated?  Viruses can be difficult to treat because they live within cells. Antibiotic medicines do not treat viruses because these drugs do not get inside cells. Treatment for a viral illness may include:    Resting and drinking plenty of fluids.  Medicines to relieve symptoms. These can include over-the-counter medicine for pain and fever, medicines for cough or congestion, and medicines to relieve diarrhea.  Antiviral medicines. These drugs are available only for certain types of viruses. They may help reduce flu symptoms if taken early. There are also many antiviral medicines for hepatitis and HIV/AIDS.    Some viral illnesses can be prevented with vaccinations. A common example is the flu shot.    Follow these instructions at home:  Medicines     Image   Take over-the-counter and prescription medicines only as told by your health care provider.  If you were prescribed an antiviral medicine, take it as told by your health care provider. Do not stop taking the medicine even if you start to feel better.  Be aware of when antibiotics are needed and when they are not needed. Antibiotics do not treat viruses. If your health care provider thinks that you may have a bacterial infection as well as a viral infection, you may get an antibiotic.    Do not ask for an antibiotic prescription if you have been diagnosed with a viral illness. That will not make your illness go away faster.  Frequently taking antibiotics when they are not needed can lead to antibiotic resistance. When this develops, the medicine no longer works against the bacteria that it normally fights.    General instructions     Drink enough fluids to keep your urine clear or pale yellow.  Rest as much as possible.  Return to your normal activities as told by your health care provider. Ask your health care provider what activities are safe for you.  Keep all follow-up visits as told by your health care provider. This is important.  How is this prevented?  ImageTake these actions to reduce your risk of viral infection:    Eat a healthy diet and get enough rest.  Wash your hands often with soap and water. This is especially important when you are in public places. If soap and water are not available, use hand .  Avoid close contact with friends and family who have a viral illness.  If you travel to areas where viral gastrointestinal infection is common, avoid drinking water or eating raw food.  Keep your immunizations up to date. Get a flu shot every year as told by your health care provider.  Do not share toothbrushes, nail clippers, razors, or needles with other people.  Always practice safe sex.    Contact a health care provider if:  You have symptoms of a viral illness that do not go away.  Your symptoms come back after going away.  Your symptoms get worse.  Get help right away if:  You have trouble breathing.  You have a severe headache or a stiff neck.  You have severe vomiting or abdominal pain.  This information is not intended to replace advice given to you by your health care provider. Make sure you discuss any questions you have with your health care provider.

## 2023-12-30 NOTE — ED ADULT NURSE NOTE - NSFALLUNIVINTERV_ED_ALL_ED
Bed/Stretcher in lowest position, wheels locked, appropriate side rails in place/Call bell, personal items and telephone in reach/Instruct patient to call for assistance before getting out of bed/chair/stretcher/Non-slip footwear applied when patient is off stretcher/[place] to call system/Physically safe environment - no spills, clutter or unnecessary equipment/Purposeful proactive rounding/Room/bathroom lighting operational, light cord in reach Bed/Stretcher in lowest position, wheels locked, appropriate side rails in place/Call bell, personal items and telephone in reach/Instruct patient to call for assistance before getting out of bed/chair/stretcher/Non-slip footwear applied when patient is off stretcher/[place] to call system/Physically safe environment - no spills, clutter or unnecessary equipment/Purposeful proactive rounding/Room/bathroom lighting operational, light cord in reach

## 2023-12-30 NOTE — ED ADULT NURSE NOTE - CAS EDN DISCHARGE INTERVENTIONS
July 21, 2021       Sandro Alvarenga MD  [address] [facility] [address]  Via In Basket      Patient: Nirmal Pantoja   YOB: 1959   Date of Visit: 7/21/2021       Dear Dr. Alvarenga:    Thank you for referring Nirmal Pantoja to me for evaluation. Below are my notes for this visit with him.    If you have questions, please do not hesitate to call me. I look forward to following your patient along with you.      Sincerely,        Radha Rey DO        CC: No Recipients  Radha Rey DO  7/21/2021 12:52 PM  Signed  Cardiology Note    Subjective   Patient ID: Nirmal Pantoja is a 62 year old male.    Chief Complaint   Patient presents with   • Office Visit   • Follow-up   • Coronary Artery Disease       HPI     Patient has started to bike ride.  Patient started to train for biking, and was biking strenuously.   Patient was biking up hill, and this was a strenuous bike ride, and not as well trained for the duration, and difficulty of the bike ride and needed to rest.  Needed to stop and rest.  Patient had to stop and catch breath.  Had dark, brown urine twice, with concerns for rhabdo, after two of these bike rides.    Patient is active.  No complaints of chest discomfort, no palpitations, no dizziness, syncope or falls.    Patient did stop statin.  Rx also is about to run out.  Ran out of metoprolol as well.    Patient's medications, allergies, past medical, surgical, social and family histories were reviewed and updated as appropriate.    Review of Systems   Constitutional: Positive for malaise/fatigue. Negative for decreased appetite, diaphoresis and weight loss.   HENT: Negative for congestion.    Eyes: Negative for visual disturbance.   Cardiovascular: Negative.    Respiratory: Negative for cough, hemoptysis, shortness of breath, sleep disturbances due to breathing, snoring, sputum production and wheezing.    Hematologic/Lymphatic: Negative for bleeding problem. Does  not bruise/bleed easily.   Skin: Negative for flushing and rash.   Musculoskeletal: Negative for arthritis, falls, joint pain and muscle cramps.   Gastrointestinal: Negative for abdominal pain, diarrhea, hematemesis, hematochezia, melena, nausea and vomiting.   Genitourinary:        Dark brown urine.   Neurological: Negative for excessive daytime sleepiness, dizziness, headaches, light-headedness, loss of balance and weakness.   Psychiatric/Behavioral: Negative for altered mental status. The patient is not nervous/anxious.          Objective     Visit Vitals  BP (!) 146/72   Pulse 84   Ht 6' 3\" (1.905 m)   Wt 102.1 kg (225 lb)   BMI 28.12 kg/m²     Physical Exam  Vitals and nursing note reviewed.   Constitutional:       Appearance: He is well-developed.   HENT:      Head: Normocephalic.   Eyes:      Conjunctiva/sclera: Conjunctivae normal.      Pupils: Pupils are equal, round, and reactive to light.   Neck:      Vascular: No JVD.      Trachea: No tracheal deviation.   Cardiovascular:      Rate and Rhythm: Normal rate and regular rhythm.      Chest Wall: PMI is not displaced.      Pulses:           Carotid pulses are 2+ on the right side and 2+ on the left side.       Dorsalis pedis pulses are 2+ on the right side and 2+ on the left side.        Posterior tibial pulses are 2+ on the right side and 2+ on the left side.      Heart sounds: Murmur heard.   Systolic murmur is present with a grade of 3/6.   No gallop.       Comments: Murmur radiation to carotids.  No edema.  Pulmonary:      Effort: Pulmonary effort is normal. No respiratory distress.      Breath sounds: Normal breath sounds. No wheezing or rales.   Chest:      Chest wall: No tenderness.   Abdominal:      General: Bowel sounds are normal.      Palpations: Abdomen is soft.      Tenderness: There is no abdominal tenderness.   Musculoskeletal:         General: No deformity.      Cervical back: Neck supple.   Skin:     General: Skin is warm and dry.    Neurological:      Mental Status: He is alert and oriented to person, place, and time.   Psychiatric:         Behavior: Behavior normal.           Assessment and Plan:  Aortic stenosis  Mean gradient of 24 mHg  Mild to Moderate aortic stenosis.  Normal ejection fraction  Echocardiogram ordered.    Atherosclerotic heart disease of native coronary artery without angina pectoris  Coronary artery disease is unchanged.  Continue current treatment regimen.  Cardiac status will be reassessed in 1 year.  RCA stent 6/2012  No chest discomfort.  Remains active.  Cardiac cath in 2015 showed patent RCA stent.    Dyslipidemia  Lipid abnormalities are stable and at goal with treatment..  Pharmacotherapy as ordered.  Lipids will be reassessed in 1 year.  Lipids ordered, and lipitor resumed.    Essential hypertension  Metoprolol resumed.      Orders Placed This Encounter   • Lipid Panel With Reflex   • Lipid Panel With Reflex   • TRANSTHORACIC ECHO (TTE) COMPLETE W/ W/O IMAGING AGENT   • metoPROLOL tartrate (LOPRESSOR) 25 MG tablet     Sig: Take 0.5 tablets by mouth 2 times daily.     Dispense:  90 tablet     Refill:  3   • atorvastatin (LIPITOR) 40 MG tablet     Sig: Take 1 tablet by mouth daily. Must be seen by physician prior to refill     Dispense:  90 tablet     Refill:  3       Radha Rey DO, FACC.                    IV discontinued, cath removed intact

## 2023-12-30 NOTE — ED PROVIDER NOTE - ATTENDING CONTRIBUTION TO CARE
68-year-old female with past medical history of paroxysmal atrial fibrillation, on baby aspirin, anemia secondary to diverticulitis with lower GI bleed, left atrial appendage occlusion with amulet with Dr. Jennifer easton, presents with substernal chest wall pain that started at 3 AM, and in nature, intermittent, started to feel body aches but more to her left arm, mild to moderate intensity, associated with dry cough, chills, upper respiratory symptoms.  Daughter was diagnosed with flu a few weeks ago but states she was not around her mother.  Denies smoking.  No fever,  n/v, sob, pleuritic cp, palpitations, diaphoresis, sore throat, ear, pain, ha/lh/dizziness, numbness/tingling, neck pain/ stiffness, abd pain, diarrhea, constipation, melena/brbpr, urinary symptoms, trauma, weakness, edema, calf pain/swelling/erythema, sick contacts, recent travel or rash.   on exam:  non toxic appearing pt sitting on stretcher speaking full sentences, no rash, mmm, neck supple. non-tender , radial pulses 2/4 b/l, no jvd,  pain to palpation to  mid chest wall, (+) reproducible, ctabl w/ breath sounds present b/l, no wheezing or crackles no tachypnea, no stridor, bs present throughout all 4 quadrants, abd soft, nd, nt, no rebound tenderness or guarding, no cvat, FROM of ext, no calf pain/swelling/erythema, AAOx3. motor 5/5 and sensation intact throughout upper and lower ext. no focal deficits.    Plan: Monitor, EKG, CXR, labs, pain control, reassess.

## 2023-12-30 NOTE — ED PROVIDER NOTE - PHYSICAL EXAMINATION
VITAL SIGNS: I have reviewed nursing notes and confirm.  CONSTITUTIONAL: Well-developed; well-nourished; in no acute distress.  SKIN: Skin exam is warm and dry, no acute rash.  EYES: PERRL, conjunctiva and sclera clear.  ENT: MMM, OP clear  CARD: S1, S2 normal; no murmurs, gallops, or rubs. Regular rate and rhythm. Chest wall nontender, no skin changes or rashes noted  RESP: Normal respiratory effort, no tachypnea or distress. Lungs CTAB, no wheezes, rales or rhonchi.  ABD: soft, NT/ND.  EXT: Normal ROM. No clubbing, cyanosis or edema.  NEURO: Alert, oriented. Grossly unremarkable. No focal deficits.  PSYCH: Cooperative, appropriate.

## 2024-01-04 ENCOUNTER — APPOINTMENT (OUTPATIENT)
Dept: ORTHOPEDIC SURGERY | Facility: CLINIC | Age: 69
End: 2024-01-04

## 2024-01-23 ENCOUNTER — OUTPATIENT (OUTPATIENT)
Dept: OUTPATIENT SERVICES | Facility: HOSPITAL | Age: 69
LOS: 1 days | End: 2024-01-23
Payer: MEDICARE

## 2024-01-23 ENCOUNTER — RESULT REVIEW (OUTPATIENT)
Age: 69
End: 2024-01-23

## 2024-01-23 VITALS
DIASTOLIC BLOOD PRESSURE: 68 MMHG | SYSTOLIC BLOOD PRESSURE: 143 MMHG | HEART RATE: 69 BPM | WEIGHT: 187.39 LBS | TEMPERATURE: 98 F | HEIGHT: 61 IN | RESPIRATION RATE: 18 BRPM | OXYGEN SATURATION: 99 %

## 2024-01-23 DIAGNOSIS — Z90.49 ACQUIRED ABSENCE OF OTHER SPECIFIED PARTS OF DIGESTIVE TRACT: Chronic | ICD-10-CM

## 2024-01-23 DIAGNOSIS — Z01.818 ENCOUNTER FOR OTHER PREPROCEDURAL EXAMINATION: ICD-10-CM

## 2024-01-23 DIAGNOSIS — M17.12 UNILATERAL PRIMARY OSTEOARTHRITIS, LEFT KNEE: ICD-10-CM

## 2024-01-23 DIAGNOSIS — Z87.59 PERSONAL HISTORY OF OTHER COMPLICATIONS OF PREGNANCY, CHILDBIRTH AND THE PUERPERIUM: Chronic | ICD-10-CM

## 2024-01-23 LAB
A1C WITH ESTIMATED AVERAGE GLUCOSE RESULT: 6.2 % — HIGH (ref 4–5.6)
ALBUMIN SERPL ELPH-MCNC: 4.3 G/DL — SIGNIFICANT CHANGE UP (ref 3.5–5.2)
ALP SERPL-CCNC: 68 U/L — SIGNIFICANT CHANGE UP (ref 30–115)
ALT FLD-CCNC: 16 U/L — SIGNIFICANT CHANGE UP (ref 0–41)
ANION GAP SERPL CALC-SCNC: 11 MMOL/L — SIGNIFICANT CHANGE UP (ref 7–14)
APTT BLD: 38.5 SEC — SIGNIFICANT CHANGE UP (ref 27–39.2)
AST SERPL-CCNC: 19 U/L — SIGNIFICANT CHANGE UP (ref 0–41)
BASOPHILS # BLD AUTO: 0.07 K/UL — SIGNIFICANT CHANGE UP (ref 0–0.2)
BASOPHILS NFR BLD AUTO: 1.1 % — HIGH (ref 0–1)
BILIRUB SERPL-MCNC: 0.5 MG/DL — SIGNIFICANT CHANGE UP (ref 0.2–1.2)
BLD GP AB SCN SERPL QL: SIGNIFICANT CHANGE UP
BUN SERPL-MCNC: 19 MG/DL — SIGNIFICANT CHANGE UP (ref 10–20)
CALCIUM SERPL-MCNC: 9.5 MG/DL — SIGNIFICANT CHANGE UP (ref 8.4–10.5)
CHLORIDE SERPL-SCNC: 105 MMOL/L — SIGNIFICANT CHANGE UP (ref 98–110)
CO2 SERPL-SCNC: 28 MMOL/L — SIGNIFICANT CHANGE UP (ref 17–32)
CREAT SERPL-MCNC: 0.7 MG/DL — SIGNIFICANT CHANGE UP (ref 0.7–1.5)
EGFR: 94 ML/MIN/1.73M2 — SIGNIFICANT CHANGE UP
EOSINOPHIL # BLD AUTO: 0.15 K/UL — SIGNIFICANT CHANGE UP (ref 0–0.7)
EOSINOPHIL NFR BLD AUTO: 2.4 % — SIGNIFICANT CHANGE UP (ref 0–8)
ESTIMATED AVERAGE GLUCOSE: 131 MG/DL — HIGH (ref 68–114)
GLUCOSE SERPL-MCNC: 105 MG/DL — HIGH (ref 70–99)
HCT VFR BLD CALC: 36.8 % — LOW (ref 37–47)
HGB BLD-MCNC: 11.6 G/DL — LOW (ref 12–16)
IMM GRANULOCYTES NFR BLD AUTO: 0.2 % — SIGNIFICANT CHANGE UP (ref 0.1–0.3)
INR BLD: 0.91 RATIO — SIGNIFICANT CHANGE UP (ref 0.65–1.3)
LYMPHOCYTES # BLD AUTO: 2.03 K/UL — SIGNIFICANT CHANGE UP (ref 1.2–3.4)
LYMPHOCYTES # BLD AUTO: 32.7 % — SIGNIFICANT CHANGE UP (ref 20.5–51.1)
MCHC RBC-ENTMCNC: 26.1 PG — LOW (ref 27–31)
MCHC RBC-ENTMCNC: 31.5 G/DL — LOW (ref 32–37)
MCV RBC AUTO: 82.7 FL — SIGNIFICANT CHANGE UP (ref 81–99)
MONOCYTES # BLD AUTO: 0.45 K/UL — SIGNIFICANT CHANGE UP (ref 0.1–0.6)
MONOCYTES NFR BLD AUTO: 7.2 % — SIGNIFICANT CHANGE UP (ref 1.7–9.3)
MRSA PCR RESULT.: NEGATIVE — SIGNIFICANT CHANGE UP
NEUTROPHILS # BLD AUTO: 3.5 K/UL — SIGNIFICANT CHANGE UP (ref 1.4–6.5)
NEUTROPHILS NFR BLD AUTO: 56.4 % — SIGNIFICANT CHANGE UP (ref 42.2–75.2)
NRBC # BLD: 0 /100 WBCS — SIGNIFICANT CHANGE UP (ref 0–0)
PLATELET # BLD AUTO: 195 K/UL — SIGNIFICANT CHANGE UP (ref 130–400)
PMV BLD: 12.2 FL — HIGH (ref 7.4–10.4)
POTASSIUM SERPL-MCNC: 4.5 MMOL/L — SIGNIFICANT CHANGE UP (ref 3.5–5)
POTASSIUM SERPL-SCNC: 4.5 MMOL/L — SIGNIFICANT CHANGE UP (ref 3.5–5)
PROT SERPL-MCNC: 7.3 G/DL — SIGNIFICANT CHANGE UP (ref 6–8)
PROTHROM AB SERPL-ACNC: 10.4 SEC — SIGNIFICANT CHANGE UP (ref 9.95–12.87)
RBC # BLD: 4.45 M/UL — SIGNIFICANT CHANGE UP (ref 4.2–5.4)
RBC # FLD: 17.5 % — HIGH (ref 11.5–14.5)
SODIUM SERPL-SCNC: 144 MMOL/L — SIGNIFICANT CHANGE UP (ref 135–146)
WBC # BLD: 6.21 K/UL — SIGNIFICANT CHANGE UP (ref 4.8–10.8)
WBC # FLD AUTO: 6.21 K/UL — SIGNIFICANT CHANGE UP (ref 4.8–10.8)

## 2024-01-23 PROCEDURE — 73562 X-RAY EXAM OF KNEE 3: CPT | Mod: LT

## 2024-01-23 PROCEDURE — 85025 COMPLETE CBC W/AUTO DIFF WBC: CPT

## 2024-01-23 PROCEDURE — 99214 OFFICE O/P EST MOD 30 MIN: CPT | Mod: 25

## 2024-01-23 PROCEDURE — 85610 PROTHROMBIN TIME: CPT

## 2024-01-23 PROCEDURE — 93010 ELECTROCARDIOGRAM REPORT: CPT

## 2024-01-23 PROCEDURE — 73562 X-RAY EXAM OF KNEE 3: CPT | Mod: 26,LT

## 2024-01-23 PROCEDURE — 87641 MR-STAPH DNA AMP PROBE: CPT

## 2024-01-23 PROCEDURE — 83036 HEMOGLOBIN GLYCOSYLATED A1C: CPT

## 2024-01-23 PROCEDURE — 80053 COMPREHEN METABOLIC PANEL: CPT

## 2024-01-23 PROCEDURE — 93005 ELECTROCARDIOGRAM TRACING: CPT

## 2024-01-23 PROCEDURE — 86901 BLOOD TYPING SEROLOGIC RH(D): CPT

## 2024-01-23 PROCEDURE — 85730 THROMBOPLASTIN TIME PARTIAL: CPT

## 2024-01-23 PROCEDURE — 86850 RBC ANTIBODY SCREEN: CPT

## 2024-01-23 PROCEDURE — 87640 STAPH A DNA AMP PROBE: CPT | Mod: XU

## 2024-01-23 PROCEDURE — 36415 COLL VENOUS BLD VENIPUNCTURE: CPT

## 2024-01-23 PROCEDURE — 86900 BLOOD TYPING SEROLOGIC ABO: CPT

## 2024-01-23 PROCEDURE — 72170 X-RAY EXAM OF PELVIS: CPT

## 2024-01-23 PROCEDURE — 72170 X-RAY EXAM OF PELVIS: CPT | Mod: 26

## 2024-01-23 NOTE — H&P PST ADULT - MUSCULOSKELETAL
normal/ROM intact/joint swelling/strength 5/5 bilateral upper extremities/decreased strength/abnormal gait

## 2024-01-23 NOTE — H&P PST ADULT - REASON FOR ADMISSION
Case Type: OP Block TimeSuite: OR SouthProceduralist: Max Murphy  Confirmed Surgery DateTime: 02- - 0:00PAST DateTime: 01- - 9:30Procedure: Left total knee replacement  ERP?: YesLaterality: LeftLength of Procedure: 120 Minutes  Anesthesia Type: Regional

## 2024-01-23 NOTE — H&P PST ADULT - NSICDXPASTMEDICALHX_GEN_ALL_CORE_FT
[facility] - Cardiac Medical ICU  Critical Care Medicine  Consult Note    Patient Name: Nura Kahn Jr.  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 and generalized weakness over the past 4 days. This has been associated with a nonproductive cough. He denies chest pain or fever.    Pt is followed by [facility], receiving IVIG and rituximab infusions. Last IVIG was 11/2021. Last rituximab was 4/4/22 and 4/29/22.  Takes MMF 1500 mg BID and prednisone 10 mg qd. Pt states his generalized weakness has worsened since getting COVID and subsequently missing his rituximab infusions.     In ED, tachycardia with MAP 64-65. He was given 30cc/kg IVF, vanc and zosyn for abx. CXR w bilateral lower lung field infiltrates, concerning for aspiration PNA. Pt placed on 4L NC, started broad spectrum antibiotics Hospital medicine consulted for admission with MICU consult to discuss ICU needs. Improved with fluid resuscitation, admitted to Hospital Medicine. Oxygen requirement improved with CAP coverage, cultures NGTD, covid negative. Required 2 units pRBC on 5/16. No evidence of bleeding, CTA negative, suspect 2/2 HbC/B Thalassemia. Hgb stable. Course c/b progressive dysphagia likely 2/2 worsening myositis while off rituxin, SLP evaluated w MBS demonstrating aspiration of thin liquids. ENT consulted, unremarkable larygnoscopy. no indication for UES dilation or cricopharyngeal myotomy. On 5/17, patient w increased WOB  "overnight and through the morning, worsening after he "overdid it" with his last meal. MICU consulted, on exam, pt w mild-mod accessory mm use, SpO2 95% on 2L NC, very weak cough, coarse upper lobes, diminished lower lobes. Tachypneic, feels SOB. Given myositis, known dysphagia, pt deemed to be high risk, concern for tiring. Transferred to MICU for airway watch, HFNC, hypertonic saline, CPT, q4 NIF. Refused bipap      Hospital/ICU Course:        Past Medical History:   Diagnosis Date    Aspiration pneumonia of right lower lobe 1/15/2018    Atrial fibrillation 3/2/2015    Chronic obstructive pulmonary disease, unspecified COPD type 2022    Depression     Essential hypertension 2019    Malignant (primary) neoplasm, unspecified 2022    Microcytic anemia 2014    Neuromuscular disorder     Other osteoporosis without current pathological fracture 2022    Pneumonia     Polymyositis 2009    Tobacco abuse        Past Surgical History:   Procedure Laterality Date    COLONOSCOPY N/A 2020    Procedure: COLONOSCOPY;  Surgeon: Brandan Meyer MD;  Location: [facility] ([address] [place] [address]);  Service: Endoscopy;  Laterality: N/A;    ESOPHAGOGASTRODUODENOSCOPY N/A 2020    Procedure: EGD (ESOPHAGOGASTRODUODENOSCOPY);  Surgeon: Brandan Meyer MD;  Location: [facility] ([address] [place] [address]);  Service: Endoscopy;  Laterality: N/A;  multiple co-morbidities. will have family member for assistance.  has neuromuscular issues-needs lift. in W/C-unable to transfer per self     COVID test at [facility] on 8/3-GT       Review of patient's allergies indicates:  No Known Allergies    Family History       Problem Relation (Age of Onset)    Diabetes Mother, Father    Hypertension Mother, Father    Kidney disease Mother          Tobacco Use    Smoking status: Some Days     Packs/day: 0.25     Years: 20.00     Pack years: 5.00     Types: Cigarettes     Last attempt to quit: 2018     Years since quittin.3    Smokeless tobacco: " Never   Substance and Sexual Activity    Alcohol use: Yes     Alcohol/week: 0.0 standard drinks    Drug use: Yes     Types: Marijuana     Comment: daily use    Sexual activity: Not on file      Review of Systems   Constitutional:  Positive for fatigue.   HENT:  Positive for trouble swallowing.    Eyes: Negative.    Respiratory:  Positive for cough, choking and shortness of breath.    Cardiovascular: Negative.    Gastrointestinal: Negative.    Endocrine: Negative.    Genitourinary: Negative.    Musculoskeletal: Negative.    Skin: Negative.    Allergic/Immunologic: Negative.    Neurological:  Positive for weakness.   Hematological: Negative.    Psychiatric/Behavioral: Negative.     All other systems reviewed and are negative.  Objective:     Vital Signs (Most Recent):  Temp: 97.6 °F (36.4 °C) (05/17/23 1134)  Pulse: 108 (05/17/23 1407)  Resp: 20 (05/17/23 1407)  BP: 130/85 (05/17/23 1134)  SpO2: 95 % (05/17/23 1407) Vital Signs (24h Range):  Temp:  [97.5 °F (36.4 °C)-98.3 °F (36.8 °C)] 97.6 °F (36.4 °C)  Pulse:  [] 108  Resp:  [16-21] 20  SpO2:  [93 %-99 %] 95 %  BP: (115-131)/(64-85) 130/85   Weight: 64.9 kg (143 lb 1.3 oz)  Body mass index is 20.53 kg/m².      Intake/Output Summary (Last 24 hours) at 5/17/2023 1457  Last data filed at 5/17/2023 0600  Gross per 24 hour   Intake 150 ml   Output 600 ml   Net -450 ml          Physical Exam  Vitals and nursing note reviewed.   Constitutional:       Appearance: He is cachectic. He is ill-appearing. He is not toxic-appearing.      Interventions: Nasal cannula in place.   HENT:      Head: Normocephalic and atraumatic.      Nose: Nose normal. No congestion or rhinorrhea.      Mouth/Throat:      Mouth: Mucous membranes are dry.   Eyes:      Extraocular Movements: Extraocular movements intact.      Pupils: Pupils are equal, round, and reactive to light.   Cardiovascular:      Rate and Rhythm: Normal rate and regular rhythm.   Pulmonary:      Effort: Respiratory distress  present.      Breath sounds: Rhonchi and rales present.      Comments: Diminished sounds BL lower lobes  Abdominal:      Palpations: Abdomen is soft.      Tenderness: There is no abdominal tenderness. There is no guarding or rebound.   Musculoskeletal:      Right lower leg: No edema.      Left lower leg: No edema.   Skin:     Findings: Lesion (stage 2 sacral decubitus ulcer) present.      Comments: Fluctuant lesion over left scapula.   Neurological:      Mental Status: He is alert and oriented to person, place, and time.      Comments: 2/5 strength very distal upper and lower extremities. 0-1/5 strength elsewhere.   Psychiatric:         Mood and Affect: Mood normal.         Behavior: Behavior normal.          Vents:  Oxygen Concentration (%): 43 (05/17/23 1407)  Lines/Drains/Airways       Drain  Duration             Male External Urinary Catheter 05/14/23 2100 Small 2 days              Peripheral Intravenous Line  Duration                  Peripheral IV - Single Lumen 05/14/23 1313 20 G Left;Posterior Wrist 3 days         Peripheral IV - Single Lumen 05/15/23 1750 20 G;1 3/4 in Left Forearm 1 day                  Significant Labs:    CBC/Anemia Profile:  Recent Labs   Lab 05/16/23  0557 05/16/23  1757 05/17/23  0844   WBC 6.88 10.73 10.86   HGB 8.0* 9.0* 9.3*   HCT 25.1* 27.6* 29.0*    163 161   MCV 63* 62* 62*   RDW 26.9* 26.5* 26.8*        Chemistries:  Recent Labs   Lab 05/16/23  0557 05/17/23  0414   * 136   K 4.0 3.3*    102   CO2 22* 23   BUN 10 10   CREATININE 0.3* 0.3*   CALCIUM 8.4* 8.7   ALBUMIN 2.1* 2.5*   PROT 5.2* 5.9*   BILITOT 1.1* 1.0   ALKPHOS 54* 60   ALT 11 11   AST 13 8*   MG 1.8  --    PHOS 2.5*  --        Significant Imaging: CXR: I have reviewed all pertinent results/findings within the past 24 hours and my personal findings are:  interval  improvement       ABG  No results for input(s): PH, PO2, PCO2, HCO3, BE in the last 168 hours.  Assessment/Plan:      Neuro  Quadriparesis (muscle weakness)  Inclusion body myositis    Pulmonary  * Acute hypoxemic respiratory failure  Patient with Hypoxic Respiratory failure which is Acute.  he is not on home oxygen. Supplemental oxygen was provided and noted- Oxygen Concentration (%):  [43] 43    .   Signs/symptoms of respiratory failure include- tachypnea, increased work of breathing and use of accessory muscles. Contributing diagnoses includes - Aspiration, COPD, Pneumonia and weakness 2/2 myositis Labs and images were reviewed. Patient Has not had a recent ABG. Will treat underlying causes and adjust management of respiratory failure as follows-     Suspect aspiration and worsening weakness d/t polymyositis.   - rheum consult to restart Rituximab  Suspect aspiration pneumonitis vs pneumonia given negative infectious workup.  Continue CAP coverage for now   - completed azithro 3 day course   - rocephin 5 day course   - viral panel negative   - legionella negative.  Comfort flow, q4h NIF  Pulm toilet: cough assist CPT, 3% NS nebulizer, DuoNebs.     Chronic obstructive pulmonary disease, unspecified COPD type  See AHRF    Aspiration pneumonia  See AHRF    Cardiac/Vascular  Essential hypertension  Holding home amldopine and losartan given sepsis presentation, will restart as indicated.     Renal/  Hyponatremia  Resolved. Labs suggest volume depletion hyponatremia, improved with IVF resuscitation     ID  Abscess  Left scapular abscess. Gen Surg consulted. Plan for bedside I&D    Immunology/Multi System  Polymyositis  See inclusion body myositis    Oncology  Beta 0 thalassemia  See anemia    Acute on chronic anemia  Chronic anemia suspected 2/2 HbC disease and Beta thalessemia. Component of chronic disease on prior iron studies. Precipitious drop during this hospitalization to 5.9 from 9.5. No blood on CIERA, absent melena, hematochezia, hematemesis, epistaxis, or other overt signs of bleeding. Hemolysis workup and CTA CAP  negative. Received 2 units of blood with improvement, remains table.     - 40mg IV PPI BID  - CBC BID  - holding chemical AC, antiplatelets.    Endocrine  Severe malnutrition  Nutrition consulted. Most recent weight and BMI monitored-     Measurements:  Wt Readings from Last 1 Encounters:   05/16/23 64.9 kg (143 lb 1.3 oz)   Body mass index is 20.53 kg/m².    Patient has been screened and assessed by RD.    Malnutrition Type:  Context: chronic illness  Level: severe    Malnutrition Characteristic Summary:  Weight Loss (Malnutrition): greater than 7.5% in 3 months  Energy Intake (Malnutrition): less than 75% for greater than or equal to 1 month  Subcutaneous Fat (Malnutrition): severe depletion  Muscle Mass (Malnutrition): severe depletion    Interventions/Recommendations (treatment strategy):  1. may warrant PEG. See Dysphagia.     GI  Hyperbilirubinemia  Resolved, possibly 2/2 HbC. Beta thalassemia     Oropharyngeal dysphagia  2/2 inclusion body myositis. Progressively worsened after Covid, missed rituximab infusions. PEG discussed with patient given malnutrition, still considering. Having recurrent aspirations, respiratory distress.    - MBSS 4/14 w aspiration of thin liquids  - ENT: laryngoscope, no structural pathology.   - 5/17: aspirated dinner, step up to MICU. NPO    SLP following. Rheum consulted, see inclusion body myositis    Orthopedic  Inclusion body myositis (IBM)  Follows w Dr. Huston at Rheumatology. On Cellcept and prednisone, endorses intermittent compliance. Was receiving rituximab infusions, but unable to since April d/t COVID and recent admission for PNA.    - Continue holding cellcept  - will complete COPD methylprednisolone course and then resume home prednisone 10mg qd  - likely contributing to worsening dysphagia, aspiration, WOB  - Rheum consulted for initiation of rituximab while inpatient.     Other  Debility  See inclusion body myositis     History of tobacco use  Former heavy smoker,  quit 4 years ago.   Occasional marijuana use        Critical Care Daily Checklist:    A: Awake: RASS Goal/Actual Goal:    Actual:     B: Spontaneous Breathing Trial Performed?     C: SAT & SBT Coordinated?  n/a                      D: Delirium: CAM-ICU     E: Early Mobility Performed? Yes   F: Feeding Goal: Goals: Meet % EEN, EPN by RD f/u date  Status: Nutrition Goal Status: new   Current Diet Order   Procedures    Diet Dysphagia Pureed (IDDSI Level 4) [facility]; Thin     MUST PERFORM CHIN TUCK AND MULTIPLE SWALLOWS FOR EVERY BITE AND SIP.   Assistance with meals. 1 small bite/sip at a time. Frequent oral care.   HOB to 90 degrees and remain upright for 30 minutes-1 hour following meals.   Meds crushed in puree.  Monitor for s/s of aspiration.     Order Specific Question:   Indicate patient location for additional diet options:     Answer:   [facility]     Order Specific Question:   Fluid consistency:     Answer:   Thin      AS: Analgesia/Sedation Oxy prn   T: Thromboembolic Prophylaxis scd   H: HOB > 300 Yes   U: Stress Ulcer Prophylaxis (if needed) y   G: Glucose Control y   B: Bowel Function Stool Occurrence: 0   I: Indwelling Catheter (Lines & Pedro) Necessity 2x pIV, condom cath   D: De-escalation of Antimicrobials/Pharmacotherapies Finishing CAP course    Plan for the day/ETD Comfort flow, NPO    Code Status:  Family/Goals of Care: Full Code         Critical secondary to Patient has a condition that poses threat to life and bodily function: Severe Respiratory Distress     Critical care was time spent personally by me on the following activities: development of treatment plan with patient or surrogate and bedside caregivers, discussions with consultants, evaluation of patient's response to treatment, examination of patient, ordering and performing treatments and interventions, ordering and review of laboratory studies, ordering and review of radiographic studies, pulse oximetry,  re-evaluation of patient's condition. This critical care time did not overlap with that of any other provider or involve time for any procedures.    Thank you for your consult. I will follow-up with patient. Please contact us if you have any additional questions.     Mary Beth Crockett MD  Critical Care Medicine  [facility] - Cardiac Medical ICU   PAST MEDICAL HISTORY:  Afib     Anemia     Depression     Diverticulitis     GERD (gastroesophageal reflux disease)     H/O cardiac arrhythmia     H/O chest pain 10/21/22    History of lower GI bleeding     Hypertension     Obese     PRISCA on CPAP     Palpitation 10/21/22

## 2024-01-23 NOTE — H&P PST ADULT - HISTORY OF PRESENT ILLNESS
Patient is a 68 year old  female presenting to PAST in preparation for  Left total knee replacement  on 2/9/24  under regional anesthesia by Dr.Jules Carlos Hopetyrone  .    Patient reports left knee pain for "many years" and recently became more painful 9/10 with use. Reports left knee swelling with overuse.  Reports taking no meds for pain, just resting the knee.    Reports recent bronchitis infection 3 weeks ago, sates she was treated with inhaler and antibiotics.      PATIENT CURRENTLY DENIES CHEST PAIN  SHORTNESS OF BREATH  PALPITATIONS,  or DYSURIA.    denies travel outside the [place] in the past 30 days  Patient denies any signs or symptoms of COVID 19 and denies contact with known positive individuals.  They have an appointment for repeat COVID testing pre-procedure and acknowledge its time and place.  They were instructed to quarantine pre-procedure, practice exposure control measures, continue to self-monitor and report any concerns to their proceduralist.  pt advised self quarantine till day of procedure    Anesthesia Alert  YES--Difficult Airway CLASS 4 AIRWAY  NO--History of neck surgery or radiation  NO--Limited ROM of neck  NO--History of Malignant hyperthermia  NO--No personal or family history of Pseudocholinesterase deficiency.  NO--Prior Anesthesia Complication  NO--Latex Allergy  NO--Loose teeth  NO--History of Rheumatoid Arthritis  yes-Bleeding risk ON ASA  yes--PRISCA (does not use cpap)  NO--Other_____    PT DENIES ANY RASHES, ABRASION, OR OPEN WOUNDS OR CUTS    AS PER THE PT, THIS IS HIS/HER COMPLETE MEDICAL AND SURGICAL HX, INCLUDING MEDICATIONS PRESCRIBED AND OVER THE COUNTER    Patient verbalized understanding of instructions and was given the opportunity to ask questions and have them answered.    pt denies any suicidal ideation or thoughts, pt states not a threat to self or others    Revised Cardiac Risk Index for Pre-Operative Risk from [email or web address]  on 1/23/2024  ** All calculations should be rechecked by clinician prior to use **    RESULT SUMMARY:  0 points  Class I Risk    3.9 %  30-day risk of death, MI, or cardiac arrest    From Duceppe 2017. These numbers are higher than those from the original study (Kory 1999). See Evidence for details.      INPUTS:  Elevated-risk surgery —> 0 = No  History of ischemic heart disease —> 0 = No  History of congestive heart failure —> 0 = No  History of cerebrovascular disease —> 0 = No  Pre-operative treatment with insulin —> 0 = No  Pre-operative creatinine >2 mg/dL / 176.8 µmol/L —> 0 = No    D[employer] Activity Status Index (DASI) from [employer]  on 1/23/2024  ** All calculations should be rechecked by clinician prior to use **    RESULT SUMMARY:  38.2 points  The higher the score (maximum 58.2), the higher the functional status.    7.44 METs        INPUTS:  Take care of self —> 2.75 = Yes  Walk indoors —> 1.75 = Yes  Walk 1&ndash;2 blocks on level ground —> 2.75 = Yes  Climb a flight of stairs or walk up a hill —> 5.5 = Yes  Run a short distance —> 0 = No  Do light work around the house —> 2.7 = Yes  Do moderate work around the house —> 3.5 = Yes  Do heavy work around the house —> 8 = Yes  Do yardwork —> 0 = No  Have sexual relations —> 5.25 = Yes  Participate in moderate recreational activities —> 6 = Yes  Participate in strenuous sports —> 0 = No

## 2024-01-24 DIAGNOSIS — Z01.818 ENCOUNTER FOR OTHER PREPROCEDURAL EXAMINATION: ICD-10-CM

## 2024-01-24 DIAGNOSIS — M17.12 UNILATERAL PRIMARY OSTEOARTHRITIS, LEFT KNEE: ICD-10-CM

## 2024-02-09 ENCOUNTER — TRANSCRIPTION ENCOUNTER (OUTPATIENT)
Age: 69
End: 2024-02-09

## 2024-02-09 ENCOUNTER — INPATIENT (INPATIENT)
Facility: HOSPITAL | Age: 69
LOS: 0 days | Discharge: HOME HEALTH | DRG: 470 | End: 2024-02-10
Attending: ORTHOPAEDIC SURGERY | Admitting: ORTHOPAEDIC SURGERY
Payer: MEDICARE

## 2024-02-09 ENCOUNTER — RESULT REVIEW (OUTPATIENT)
Age: 69
End: 2024-02-09

## 2024-02-09 ENCOUNTER — APPOINTMENT (OUTPATIENT)
Dept: ORTHOPEDIC SURGERY | Facility: HOSPITAL | Age: 69
End: 2024-02-09

## 2024-02-09 VITALS
HEIGHT: 62 IN | RESPIRATION RATE: 17 BRPM | TEMPERATURE: 97 F | WEIGHT: 179.9 LBS | OXYGEN SATURATION: 97 % | HEART RATE: 66 BPM | DIASTOLIC BLOOD PRESSURE: 82 MMHG | SYSTOLIC BLOOD PRESSURE: 187 MMHG

## 2024-02-09 DIAGNOSIS — Z90.49 ACQUIRED ABSENCE OF OTHER SPECIFIED PARTS OF DIGESTIVE TRACT: Chronic | ICD-10-CM

## 2024-02-09 DIAGNOSIS — M17.12 UNILATERAL PRIMARY OSTEOARTHRITIS, LEFT KNEE: ICD-10-CM

## 2024-02-09 DIAGNOSIS — Z98.890 OTHER SPECIFIED POSTPROCEDURAL STATES: Chronic | ICD-10-CM

## 2024-02-09 DIAGNOSIS — Z87.59 PERSONAL HISTORY OF OTHER COMPLICATIONS OF PREGNANCY, CHILDBIRTH AND THE PUERPERIUM: Chronic | ICD-10-CM

## 2024-02-09 LAB — GLUCOSE BLDC GLUCOMTR-MCNC: 109 MG/DL — HIGH (ref 70–99)

## 2024-02-09 PROCEDURE — 73560 X-RAY EXAM OF KNEE 1 OR 2: CPT | Mod: 26,LT

## 2024-02-09 PROCEDURE — 88311 DECALCIFY TISSUE: CPT

## 2024-02-09 PROCEDURE — 97110 THERAPEUTIC EXERCISES: CPT | Mod: GP

## 2024-02-09 PROCEDURE — C1713: CPT

## 2024-02-09 PROCEDURE — 73560 X-RAY EXAM OF KNEE 1 OR 2: CPT | Mod: LT

## 2024-02-09 PROCEDURE — 82962 GLUCOSE BLOOD TEST: CPT

## 2024-02-09 PROCEDURE — 27447 TOTAL KNEE ARTHROPLASTY: CPT | Mod: LT

## 2024-02-09 PROCEDURE — 80048 BASIC METABOLIC PNL TOTAL CA: CPT

## 2024-02-09 PROCEDURE — C1776: CPT

## 2024-02-09 PROCEDURE — 88305 TISSUE EXAM BY PATHOLOGIST: CPT | Mod: 26

## 2024-02-09 PROCEDURE — 88311 DECALCIFY TISSUE: CPT | Mod: 26

## 2024-02-09 PROCEDURE — 97116 GAIT TRAINING THERAPY: CPT | Mod: GP

## 2024-02-09 PROCEDURE — 36415 COLL VENOUS BLD VENIPUNCTURE: CPT

## 2024-02-09 PROCEDURE — 85027 COMPLETE CBC AUTOMATED: CPT

## 2024-02-09 PROCEDURE — 97162 PT EVAL MOD COMPLEX 30 MIN: CPT | Mod: GP

## 2024-02-09 PROCEDURE — 97165 OT EVAL LOW COMPLEX 30 MIN: CPT | Mod: GO

## 2024-02-09 PROCEDURE — 88305 TISSUE EXAM BY PATHOLOGIST: CPT

## 2024-02-09 RX ORDER — ACETAMINOPHEN 500 MG
650 TABLET ORAL EVERY 6 HOURS
Refills: 0 | Status: DISCONTINUED | OUTPATIENT
Start: 2024-02-09 | End: 2024-02-10

## 2024-02-09 RX ORDER — SENNA PLUS 8.6 MG/1
2 TABLET ORAL
Qty: 30 | Refills: 0
Start: 2024-02-09 | End: 2024-02-23

## 2024-02-09 RX ORDER — METOPROLOL TARTRATE 50 MG
25 TABLET ORAL DAILY
Refills: 0 | Status: DISCONTINUED | OUTPATIENT
Start: 2024-02-09 | End: 2024-02-10

## 2024-02-09 RX ORDER — HYDROMORPHONE HYDROCHLORIDE 2 MG/ML
0.5 INJECTION INTRAMUSCULAR; INTRAVENOUS; SUBCUTANEOUS
Refills: 0 | Status: DISCONTINUED | OUTPATIENT
Start: 2024-02-09 | End: 2024-02-09

## 2024-02-09 RX ORDER — ASPIRIN/CALCIUM CARB/MAGNESIUM 324 MG
81 TABLET ORAL EVERY 12 HOURS
Refills: 0 | Status: DISCONTINUED | OUTPATIENT
Start: 2024-02-09 | End: 2024-02-10

## 2024-02-09 RX ORDER — ONDANSETRON 8 MG/1
4 TABLET, FILM COATED ORAL EVERY 6 HOURS
Refills: 0 | Status: DISCONTINUED | OUTPATIENT
Start: 2024-02-09 | End: 2024-02-10

## 2024-02-09 RX ORDER — ASPIRIN/CALCIUM CARB/MAGNESIUM 324 MG
1 TABLET ORAL
Qty: 60 | Refills: 0
Start: 2024-02-09 | End: 2024-03-09

## 2024-02-09 RX ORDER — SODIUM CHLORIDE 9 MG/ML
1000 INJECTION INTRAMUSCULAR; INTRAVENOUS; SUBCUTANEOUS
Refills: 0 | Status: DISCONTINUED | OUTPATIENT
Start: 2024-02-09 | End: 2024-02-10

## 2024-02-09 RX ORDER — CELECOXIB 200 MG/1
400 CAPSULE ORAL ONCE
Refills: 0 | Status: COMPLETED | OUTPATIENT
Start: 2024-02-09 | End: 2024-02-09

## 2024-02-09 RX ORDER — ACETAMINOPHEN 500 MG
1000 TABLET ORAL ONCE
Refills: 0 | Status: COMPLETED | OUTPATIENT
Start: 2024-02-09 | End: 2024-02-09

## 2024-02-09 RX ORDER — CELECOXIB 200 MG/1
200 CAPSULE ORAL EVERY 12 HOURS
Refills: 0 | Status: DISCONTINUED | OUTPATIENT
Start: 2024-02-10 | End: 2024-02-10

## 2024-02-09 RX ORDER — ONDANSETRON 8 MG/1
4 TABLET, FILM COATED ORAL ONCE
Refills: 0 | Status: DISCONTINUED | OUTPATIENT
Start: 2024-02-09 | End: 2024-02-09

## 2024-02-09 RX ORDER — SENNA PLUS 8.6 MG/1
2 TABLET ORAL AT BEDTIME
Refills: 0 | Status: DISCONTINUED | OUTPATIENT
Start: 2024-02-09 | End: 2024-02-10

## 2024-02-09 RX ORDER — DEXAMETHASONE 0.5 MG/5ML
2 ELIXIR ORAL ONCE
Refills: 0 | Status: COMPLETED | OUTPATIENT
Start: 2024-02-10 | End: 2024-02-10

## 2024-02-09 RX ORDER — NALOXONE HYDROCHLORIDE 4 MG/.1ML
4 SPRAY NASAL
Qty: 2 | Refills: 0
Start: 2024-02-09 | End: 2024-02-12

## 2024-02-09 RX ORDER — MAGNESIUM HYDROXIDE 400 MG/1
30 TABLET, CHEWABLE ORAL DAILY
Refills: 0 | Status: DISCONTINUED | OUTPATIENT
Start: 2024-02-09 | End: 2024-02-10

## 2024-02-09 RX ORDER — OXYCODONE HYDROCHLORIDE 5 MG/1
5 TABLET ORAL EVERY 8 HOURS
Refills: 0 | Status: DISCONTINUED | OUTPATIENT
Start: 2024-02-09 | End: 2024-02-10

## 2024-02-09 RX ORDER — CHLORHEXIDINE GLUCONATE 213 G/1000ML
1 SOLUTION TOPICAL DAILY
Refills: 0 | Status: DISCONTINUED | OUTPATIENT
Start: 2024-02-09 | End: 2024-02-10

## 2024-02-09 RX ORDER — TRAMADOL HYDROCHLORIDE 50 MG/1
1 TABLET ORAL
Qty: 24 | Refills: 0
Start: 2024-02-09 | End: 2024-02-14

## 2024-02-09 RX ORDER — OXYCODONE HYDROCHLORIDE 5 MG/1
1 TABLET ORAL
Qty: 9 | Refills: 0
Start: 2024-02-09 | End: 2024-02-11

## 2024-02-09 RX ORDER — PANTOPRAZOLE SODIUM 20 MG/1
40 TABLET, DELAYED RELEASE ORAL
Refills: 0 | Status: DISCONTINUED | OUTPATIENT
Start: 2024-02-09 | End: 2024-02-10

## 2024-02-09 RX ORDER — IBUPROFEN 200 MG
1 TABLET ORAL
Qty: 30 | Refills: 0
Start: 2024-02-09 | End: 2024-02-18

## 2024-02-09 RX ORDER — TRAMADOL HYDROCHLORIDE 50 MG/1
50 TABLET ORAL EVERY 4 HOURS
Refills: 0 | Status: DISCONTINUED | OUTPATIENT
Start: 2024-02-09 | End: 2024-02-10

## 2024-02-09 RX ORDER — ACETAMINOPHEN 500 MG
2 TABLET ORAL
Qty: 96 | Refills: 0
Start: 2024-02-09 | End: 2024-02-20

## 2024-02-09 RX ORDER — SODIUM CHLORIDE 9 MG/ML
1000 INJECTION, SOLUTION INTRAVENOUS
Refills: 0 | Status: DISCONTINUED | OUTPATIENT
Start: 2024-02-09 | End: 2024-02-09

## 2024-02-09 RX ORDER — ASPIRIN/CALCIUM CARB/MAGNESIUM 324 MG
1 TABLET ORAL
Refills: 0 | DISCHARGE

## 2024-02-09 RX ORDER — ENALAPRIL MALEATE AND HYDROCHLOROTHIAZIDE 10; 25 MG/1; MG/1
1 TABLET ORAL
Qty: 0 | Refills: 0 | DISCHARGE

## 2024-02-09 RX ORDER — VANCOMYCIN HCL 1 G
1000 VIAL (EA) INTRAVENOUS EVERY 12 HOURS
Refills: 0 | Status: COMPLETED | OUTPATIENT
Start: 2024-02-09 | End: 2024-02-10

## 2024-02-09 RX ORDER — PANTOPRAZOLE SODIUM 20 MG/1
1 TABLET, DELAYED RELEASE ORAL
Qty: 30 | Refills: 0
Start: 2024-02-09 | End: 2024-03-09

## 2024-02-09 RX ORDER — KETOROLAC TROMETHAMINE 30 MG/ML
15 SYRINGE (ML) INJECTION EVERY 6 HOURS
Refills: 0 | Status: DISCONTINUED | OUTPATIENT
Start: 2024-02-09 | End: 2024-02-10

## 2024-02-09 RX ORDER — POLYETHYLENE GLYCOL 3350 17 G/17G
17 POWDER, FOR SOLUTION ORAL AT BEDTIME
Refills: 0 | Status: DISCONTINUED | OUTPATIENT
Start: 2024-02-09 | End: 2024-02-10

## 2024-02-09 RX ADMIN — OXYCODONE HYDROCHLORIDE 5 MILLIGRAM(S): 5 TABLET ORAL at 19:17

## 2024-02-09 RX ADMIN — SODIUM CHLORIDE 75 MILLILITER(S): 9 INJECTION INTRAMUSCULAR; INTRAVENOUS; SUBCUTANEOUS at 17:20

## 2024-02-09 RX ADMIN — Medication 1000 MILLIGRAM(S): at 11:16

## 2024-02-09 RX ADMIN — Medication 650 MILLIGRAM(S): at 18:50

## 2024-02-09 RX ADMIN — Medication 650 MILLIGRAM(S): at 23:47

## 2024-02-09 RX ADMIN — Medication 650 MILLIGRAM(S): at 17:19

## 2024-02-09 RX ADMIN — Medication 250 MILLIGRAM(S): at 17:20

## 2024-02-09 RX ADMIN — CELECOXIB 400 MILLIGRAM(S): 200 CAPSULE ORAL at 10:39

## 2024-02-09 RX ADMIN — Medication 81 MILLIGRAM(S): at 17:19

## 2024-02-09 RX ADMIN — Medication 15 MILLIGRAM(S): at 18:50

## 2024-02-09 RX ADMIN — CELECOXIB 400 MILLIGRAM(S): 200 CAPSULE ORAL at 11:16

## 2024-02-09 RX ADMIN — Medication 1000 MILLIGRAM(S): at 10:39

## 2024-02-09 RX ADMIN — Medication 15 MILLIGRAM(S): at 23:48

## 2024-02-09 RX ADMIN — POLYETHYLENE GLYCOL 3350 17 GRAM(S): 17 POWDER, FOR SOLUTION ORAL at 23:48

## 2024-02-09 RX ADMIN — SENNA PLUS 2 TABLET(S): 8.6 TABLET ORAL at 23:47

## 2024-02-09 RX ADMIN — Medication 15 MILLIGRAM(S): at 17:19

## 2024-02-09 NOTE — DISCHARGE NOTE PROVIDER - NSDCQMPCI_CARD_ALL_CORE
History     Chief Complaint   Patient presents with     Flank Pain     c/o lt flank pain, notes had blood in his urine last night     HPI  Aaron Conner is a 24 year old male who presents with left flank pain, history of similar, no prior evaluation, onset several days ago, no fevers, no chills, perhaps mild nausea but no vomiting, denies dysuria, denies frequency, denies urgency.  No testicular discomfort nor penile discharge.  Notes history of UTIs per report.  Denies any other significant past medical history on query.  No other associated symptoms.  Patient denies likelihood of sexually transmitted infection.  Patient notes family history of renal colic.    Allergies:  No Known Allergies    Past Medical History:    Past Medical History:   Diagnosis Date     Allergic rhinitis, cause unspecified 12/10/2010     Anxiety 3/28/2011     Cephalgia 5/14/2012     Chronic tonsillitis 5/14/2012     Peripheral vertigo, unspecified 3/14/2011       Past Surgical History:    Past Surgical History:   Procedure Laterality Date     APPENDECTOMY      appendicitis     foreign  body removed from right long finger  1994     maxillary antrostomy  5/2011    LT       Family History: Patient notes family history of kidney stones  Family History   Problem Relation Age of Onset     Cancer Maternal Grandfather         lung     Cancer Mother         ovarian       Social History:   Marital Status:  Single [1]  Social History     Tobacco Use     Smoking status: Current Every Day Smoker     Packs/day: 1.00     Types: Cigarettes     Smokeless tobacco: Former User     Types: Chew   Substance Use Topics     Alcohol use: Yes     Comment: social     Drug use: No        Review of Systems  Respiratory denies.  Cardiovascular denies.  GI per HPI.   per HPI.  Neurologic denies.  Skin denies.  Constitutional denies.  Remainder of complete 10 point review of systems negative.    Physical Exam   BP: 136/71  Heart Rate: (!) 123  Temp: 98.2  F (36.8   C)  Resp: 14  SpO2: 99 %      Physical Exam   Constitutional: He is oriented to person, place, and time. He appears well-developed and well-nourished. No distress.   HENT:   Mouth/Throat: Oropharynx is clear and moist.   Eyes: Conjunctivae are normal.   Neck: Normal range of motion. Neck supple.   Pulmonary/Chest: Effort normal and breath sounds normal. No respiratory distress. He has no wheezes.   Abdominal: Soft. He exhibits no distension. There is no tenderness.   Genitourinary:   Genitourinary Comments: No testicular discomfort on examination.  No discomfort nor skin changes about the glands nor shaft of the penis.  No suprapubic tenderness.   Musculoskeletal: Normal range of motion. He exhibits no edema or deformity.   Neurological: He is alert and oriented to person, place, and time.   Skin: Skin is warm and dry. He is not diaphoretic.   Psychiatric: He has a normal mood and affect.          Results for orders placed or performed during the hospital encounter of 06/10/19 (from the past 24 hour(s))   Abd/pelvis CT - no contrast - Stone Protocol    Narrative    PROCEDURE:  CT ABDOMEN PELVIS W/O CONTRAST    HISTORY:  Flank pain, stone disease suspected    TECHNIQUE:  Helical CT of the abdomen and pelvis was performed without  intravenous contrast.    COMPARISON:  None.    FINDINGS:      Evaluation of the solid organs is somewhat limited due to the lack of  intravenous contrast.    Limited views through the lung bases show no focal consolidation or  intrapulmonary mass. The heart size is normal. No pericardial or  pleural effusions are seen.    Multiple punctate bilateral renal stones are present. There is mild  left hydroureteronephrosis secondary to a 3 mm stone in the mid left  ureter.    The liver, gallbladder, spleen, pancreas and adrenal glands are  unremarkable. There is no abdominal aortic aneurysm.  The bowel is  normal in caliber.    No free fluid, free air or adenopathy is present.  No  suspicious  osseous lesions are identified.      Impression    IMPRESSION:      Mild left hydroureteronephrosis secondary to a 3 mm stone in the mid  left ureter    SIS ANDERSON MD   UA reflex to Microscopic   Result Value Ref Range    Color Urine Yellow     Appearance Urine Clear     Glucose Urine Negative NEG^Negative mg/dL    Bilirubin Urine Negative NEG^Negative    Ketones Urine 40 (A) NEG^Negative mg/dL    Specific Gravity Urine 1.026 1.003 - 1.035    Blood Urine Moderate (A) NEG^Negative    pH Urine 5.5 4.7 - 8.0 pH    Protein Albumin Urine 10 (A) NEG^Negative mg/dL    Urobilinogen mg/dL Normal 0.0 - 2.0 mg/dL    Nitrite Urine Negative NEG^Negative    Leukocyte Esterase Urine Negative NEG^Negative    Source Unspecified Urine     RBC Urine >182 (H) 0 - 2 /HPF    WBC Urine 4 0 - 5 /HPF    Bacteria Urine None (A) NEG^Negative /HPF    Squamous Epithelial /HPF Urine 1 0 - 1 /HPF    Mucous Urine Present (A) NEG^Negative /LPF       Medications - No data to display    Assessments & Plan (with Medical Decision Making)   24-year-old male with family history of renal colic presenting with left flank pain in the setting of hematuria.  Urinalysis with hematuria however no evidence for overt UTI.  Patient without report of dysuria nor frequency nor urgency.  Vital signs reassuring.  Examination without focality.  Renal stone CT scan shows left mid ureter 3 mm stone with mild hydro.  This stone is of a size where the patient should pass the stone.  Plan at this juncture is for outpatient ibuprofen, patient requests 800 mg ibuprofens, declines opioid therapy, advised to take ibuprofen with food to avoid gastritis.  Flomax advised per local urology routine and protocol.  Urine strainer provided.  Local urology contact provided.  Patient advised to return if any new or concerning symptoms.  Patient educated regarding renal colic.  He agreed to return if any concerns or refractory pain.  Repeat pulse not tachycardic at  discharge.         Medication List      Started    ibuprofen 800 MG tablet  Commonly known as:  ADVIL/MOTRIN  800 mg, Oral, EVERY 8 HOURS PRN     tamsulosin 0.4 MG capsule  Commonly known as:  FLOMAX  0.4 mg, Oral, DAILY            Final diagnoses:   Renal colic on left side   Hydronephrosis, unspecified hydronephrosis type       6/10/2019   [facility]     Kwame Espinoza MD  06/10/19 1936       Kwame Espinoza MD  06/10/19 1937     No

## 2024-02-09 NOTE — PHYSICAL THERAPY INITIAL EVALUATION ADULT - DID THE PATIENT HAVE SURGERY?
Impression: Other vitreous opacities, left eye: H43.392. Plan: Dexycu in vitreous cavity - asymptomatic.  Observe L TKR/yes

## 2024-02-09 NOTE — DISCHARGE NOTE PROVIDER - NSDCMRMEDTOKEN_GEN_ALL_CORE_FT
aspirin 81 mg oral capsule: 1 cap(s) orally once a day  enalapril-hydrochlorothiazide 5 mg-12.5 mg oral tablet: 1 tab(s) orally once a day  metoprolol succinate 25 mg oral tablet, extended release: 1 tab(s) orally once a day    acetaminophen 325 mg oral tablet: 2 tab(s) orally every 6 hours MDD: 8  aspirin 81 mg oral delayed release tablet: 1 tab(s) orally every 12 hours MDD: 2  enalapril-hydrochlorothiazide 5 mg-12.5 mg oral tablet: 1 tab(s) orally once a day  ibuprofen 400 mg oral tablet: 1 tab(s) orally 3 times a day MDD: 3  metoprolol succinate 25 mg oral tablet, extended release: 1 tab(s) orally once a day   naloxone 4 mg/0.1 mL nasal spray: 4 milligram(s) intranasally once a day as needed for opioid overdose MDD: 1  oxyCODONE 5 mg oral tablet: 1 tab(s) orally every 8 hours as needed for breakthrough pain MDD: 3  Protonix 40 mg oral delayed release tablet: 1 tab(s) orally once a day MDD: 1  senna leaf extract oral tablet: 2 tab(s) orally once a day (at bedtime) as needed for  constipation MDD: 2  traMADol 50 mg oral tablet: 1 tab(s) orally every 6 hours as needed for  severe pain MDD: 4

## 2024-02-09 NOTE — PHYSICAL THERAPY INITIAL EVALUATION ADULT - PERTINENT HX OF CURRENT PROBLEM, REHAB EVAL
Pt is a 67 y/o female with dx of elective L TKR with h/o L knee OA under regional anesthesia seen pod #0.

## 2024-02-09 NOTE — BRIEF OPERATIVE NOTE - NSICDXBRIEFPOSTOP_GEN_ALL_CORE_FT
POST-OP DIAGNOSIS:  Osteoarthritis of left knee 09-Feb-2024 14:12:30  Hip-Max Mcintosh   Eliptical Excision Additional Text (Leave Blank If You Do Not Want): The margin was drawn around the clinically apparent lesion.  An elliptical shape was then drawn on the skin incorporating the lesion and margins.  Incisions were then made along these lines to the appropriate tissue plane and the lesion was extirpated.

## 2024-02-09 NOTE — DISCHARGE NOTE PROVIDER - NSDCFUSCHEDAPPT_GEN_ALL_CORE_FT
Max Ahuja  [facility] Physician [facility]  [address]  Scheduled Appointment: 02/29/2024

## 2024-02-09 NOTE — PHYSICAL THERAPY INITIAL EVALUATION ADULT - GENERAL OBSERVATIONS, REHAB EVAL
19:15-19:45. Chart reviewed; confirmed with RN to see the pt for PT. Pt ready for PT; received in chair with complain of 5/10 pain in L knee. +hep lock, +ace bandage L knee. Agreeable for PT evaluation.

## 2024-02-09 NOTE — ASU PREOP CHECKLIST - SITE MARKED BY ANESTHESIOLOGIST
PATIENT HAS PERIODS OF LETHARGY AND AGITATION.  PULLED OUT SALINE LOCK VIA EJ .
Pt endorsed to PAPO Rodriguez at 0717.
patient condition guarded, breathing on oxygen via nasal cannula. Connected to cardiac monitor. Vitals charted. Admitting team informed about patient, several times. No definite response made.
patient condition remains guarded, is being nursed on oxygen via nasal cannula @ 4lpm. Verbally responsive, relative at bedisde.Patient left as comfortable as condition allows. Vitals charted.
n/a

## 2024-02-09 NOTE — ASU PATIENT PROFILE, ADULT - NSICDXPASTMEDICALHX_GEN_ALL_CORE_FT
PAST MEDICAL HISTORY:  Afib     Anemia     Depression     Diverticulitis     GERD (gastroesophageal reflux disease)     H/O cardiac arrhythmia     H/O chest pain 10/21/22    History of lower GI bleeding     Hypertension     Obesity with body mass index (BMI) of 30.0 to 39.9     PRISCA on CPAP     Palpitation 10/21/22

## 2024-02-09 NOTE — DISCHARGE NOTE PROVIDER - CARE PROVIDER_API CALL
[place]-Max Mcintosh  Orthopaedic Surgery  [address] [place] [address]  Phone: [phone]  Fax: [phone]  Scheduled Appointment: 02/29/2024

## 2024-02-09 NOTE — PATIENT PROFILE ADULT - FALL HARM RISK - RISK INTERVENTIONS
Assistance OOB with selected safe patient handling equipment/Assistance with ambulation/Communicate Fall Risk and Risk Factors to all staff, patient, and family/Discuss with provider need for PT consult/Monitor gait and stability/Provide patient with walking aids - walker, cane, crutches/Reinforce activity limits and safety measures with patient and family/Sit up slowly, dangle for a short time, stand at bedside before walking/Use of alarms - bed, chair and/or voice tab/Visual Cue: Yellow wristband/Bed in lowest position, wheels locked, appropriate side rails in place/Call bell, personal items and telephone in reach/Instruct patient to call for assistance before getting out of bed or chair/Non-slip footwear when patient is out of bed/[place] to call system/Physically safe environment - no spills, clutter or unnecessary equipment/Purposeful Proactive Rounding/Room/bathroom lighting operational, light cord in reach

## 2024-02-09 NOTE — ASU PATIENT PROFILE, ADULT - NS PRO MODE OF ARRIVAL
Personalized Preventive Plan for Harish Carroll - 9/22/2021  Medicare offers a range of preventive health benefits. Some of the tests and screenings are paid in full while other may be subject to a deductible, co-insurance, and/or copay. Some of these benefits include a comprehensive review of your medical history including lifestyle, illnesses that may run in your family, and various assessments and screenings as appropriate. After reviewing your medical record and screening and assessments performed today your provider may have ordered immunizations, labs, imaging, and/or referrals for you. A list of these orders (if applicable) as well as your Preventive Care list are included within your After Visit Summary for your review. Other Preventive Recommendations:    · A preventive eye exam performed by an eye specialist is recommended every 1-2 years to screen for glaucoma; cataracts, macular degeneration, and other eye disorders. · A preventive dental visit is recommended every 6 months. · Try to get at least 150 minutes of exercise per week or 10,000 steps per day on a pedometer . · Order or download the FREE \"Exercise & Physical Activity: Your Everyday Guide\" from [employer]. Call 5-789.251.4119 or search [employer] online. · You need 9204-7138 mg of calcium and 1218-5426 IU of vitamin D per day. It is possible to meet your calcium requirement with diet alone, but a vitamin D supplement is usually necessary to meet this goal.  · When exposed to the sun, use a sunscreen that protects against both UVA and UVB radiation with an SPF of 30 or greater. Reapply every 2 to 3 hours or after sweating, drying off with a towel, or swimming. · Always wear a seat belt when traveling in a car. Always wear a helmet when riding a bicycle or motorcycle. ambulatory

## 2024-02-09 NOTE — ASU PATIENT PROFILE, ADULT - NSICDXPASTSURGICALHX_GEN_ALL_CORE_FT
PAST SURGICAL HISTORY:  H/O cardiac radiofrequency ablation     S/P appendectomy     S/P cholecystectomy     S/P ectopic pregnancy

## 2024-02-09 NOTE — DISCHARGE NOTE PROVIDER - NSDCCPCAREPLAN_GEN_ALL_CORE_FT
PRINCIPAL DISCHARGE DIAGNOSIS  Diagnosis: Status post left knee replacement  Assessment and Plan of Treatment:

## 2024-02-09 NOTE — ASU PATIENT PROFILE, ADULT - FALL HARM RISK - UNIVERSAL INTERVENTIONS
Bed in lowest position, wheels locked, appropriate side rails in place/Call bell, personal items and telephone in reach/Instruct patient to call for assistance before getting out of bed or chair/Non-slip footwear when patient is out of bed/[place] to call system/Physically safe environment - no spills, clutter or unnecessary equipment/Purposeful Proactive Rounding/Room/bathroom lighting operational, light cord in reach

## 2024-02-09 NOTE — PROGRESS NOTE ADULT - SUBJECTIVE AND OBJECTIVE BOX
LEFT TKA ORTHOPEDIC POST OP CHECK    T(C): 35.9 (02-09-24 @ 16:52), Max: 36.3 (02-09-24 @ 10:22)  HR: 47 (02-09-24 @ 16:52) (47 - 66)  BP: 173/78 (02-09-24 @ 16:52) (128/70 - 187/82)  RR: 18 (02-09-24 @ 16:52) (16 - 18)  SpO2: 98% (02-09-24 @ 16:52) (96% - 99%)        S/P left Total Knee Replacement POD#0  -Pain Controlled  -Vital signs stable  -AxOx3  -Dressing Aquacel i place c/d/i  -NVI  -post op abx x2 doses  -encourage incentive spirometry  -sequentials  -chem DVT ordered  -PT/OT Post op   -D/c Planning for tomorrow

## 2024-02-09 NOTE — DISCHARGE NOTE PROVIDER - HOSPITAL COURSE
68y Female underwent a left tka on 2/9/2024. Patient tolerated surgery well with no intra/post operative complications. Patient was given intra/post operative antibiotics for infection prophylaxis. Patient will be discharged on aspirin 81mg BID x30 days  to prevent blood clots. Patient worked with Physical Therapy while admitted to the hospital. Patient is stable for discharge.

## 2024-02-09 NOTE — DISCHARGE NOTE PROVIDER - NSDCCPTREATMENT_GEN_ALL_CORE_FT
PRINCIPAL PROCEDURE  Procedure: Total knee replacement  Findings and Treatment: left      SECONDARY PROCEDURE  Procedure: Total knee replacement  Findings and Treatment: left

## 2024-02-09 NOTE — DISCHARGE NOTE PROVIDER - NSDCCPGOAL_GEN_ALL_CORE_FT
To get better and follow your care plan as instructed. To get better and follow your care plan as instructed. PT, wbat with walker, cont aspirin 81 mg bid x 30 days for dvt ppx, cont protonix x30 days for GI ppx, take pain meds as prescribed, keep postop dressing 1 week, may shower, f/u as OP with dr Ahuja in 3 weeks

## 2024-02-09 NOTE — DISCHARGE NOTE PROVIDER - NSDCFUADDINST_GEN_ALL_CORE_FT
Please continue Physical therapy at home, weight bearing as tolerated.   Continue Aspirin 81mg twice a day x30 days to prevent blood clots.   Continue protonix 40mg once daily x30 days for GI prophylaxis.   Keep post-op dressing on for 1 week, showering with dressing on is allowed begining on post op day 2; dressing is water resistant. If dressing falls off before 7 days that is OK. Make sure to keep incision site dry after showering. Do not apply any creams or lotions to incision site.   If any purulent/excessive drainage please contact your surgeon.   Follow up with   in 3 weeks.

## 2024-02-09 NOTE — PHYSICAL THERAPY INITIAL EVALUATION ADULT - LIVES WITH, PROFILE
Pt lives with  in a private home with 7 steps to enter with R handrail going up and 10 steps inside to the basement with L handrail going down./spouse

## 2024-02-10 ENCOUNTER — TRANSCRIPTION ENCOUNTER (OUTPATIENT)
Age: 69
End: 2024-02-10

## 2024-02-10 VITALS
HEART RATE: 65 BPM | DIASTOLIC BLOOD PRESSURE: 60 MMHG | TEMPERATURE: 97 F | SYSTOLIC BLOOD PRESSURE: 113 MMHG | OXYGEN SATURATION: 95 % | RESPIRATION RATE: 18 BRPM

## 2024-02-10 LAB
ANION GAP SERPL CALC-SCNC: 8 MMOL/L — SIGNIFICANT CHANGE UP (ref 7–14)
BUN SERPL-MCNC: 16 MG/DL — SIGNIFICANT CHANGE UP (ref 10–20)
CALCIUM SERPL-MCNC: 9 MG/DL — SIGNIFICANT CHANGE UP (ref 8.4–10.5)
CHLORIDE SERPL-SCNC: 104 MMOL/L — SIGNIFICANT CHANGE UP (ref 98–110)
CO2 SERPL-SCNC: 28 MMOL/L — SIGNIFICANT CHANGE UP (ref 17–32)
CREAT SERPL-MCNC: 0.7 MG/DL — SIGNIFICANT CHANGE UP (ref 0.7–1.5)
EGFR: 94 ML/MIN/1.73M2 — SIGNIFICANT CHANGE UP
GLUCOSE SERPL-MCNC: 149 MG/DL — HIGH (ref 70–99)
HCT VFR BLD CALC: 31.2 % — LOW (ref 37–47)
HGB BLD-MCNC: 10.2 G/DL — LOW (ref 12–16)
MCHC RBC-ENTMCNC: 26.5 PG — LOW (ref 27–31)
MCHC RBC-ENTMCNC: 32.7 G/DL — SIGNIFICANT CHANGE UP (ref 32–37)
MCV RBC AUTO: 81 FL — SIGNIFICANT CHANGE UP (ref 81–99)
NRBC # BLD: 0 /100 WBCS — SIGNIFICANT CHANGE UP (ref 0–0)
PLATELET # BLD AUTO: 151 K/UL — SIGNIFICANT CHANGE UP (ref 130–400)
PMV BLD: 11.8 FL — HIGH (ref 7.4–10.4)
POTASSIUM SERPL-MCNC: 4.1 MMOL/L — SIGNIFICANT CHANGE UP (ref 3.5–5)
POTASSIUM SERPL-SCNC: 4.1 MMOL/L — SIGNIFICANT CHANGE UP (ref 3.5–5)
RBC # BLD: 3.85 M/UL — LOW (ref 4.2–5.4)
RBC # FLD: 17.4 % — HIGH (ref 11.5–14.5)
SODIUM SERPL-SCNC: 140 MMOL/L — SIGNIFICANT CHANGE UP (ref 135–146)
WBC # BLD: 13.69 K/UL — HIGH (ref 4.8–10.8)
WBC # FLD AUTO: 13.69 K/UL — HIGH (ref 4.8–10.8)

## 2024-02-10 PROCEDURE — 99221 1ST HOSP IP/OBS SF/LOW 40: CPT

## 2024-02-10 RX ADMIN — Medication 650 MILLIGRAM(S): at 11:21

## 2024-02-10 RX ADMIN — Medication 81 MILLIGRAM(S): at 05:01

## 2024-02-10 RX ADMIN — Medication 15 MILLIGRAM(S): at 11:58

## 2024-02-10 RX ADMIN — Medication 15 MILLIGRAM(S): at 05:02

## 2024-02-10 RX ADMIN — Medication 650 MILLIGRAM(S): at 03:26

## 2024-02-10 RX ADMIN — Medication 5 MILLIGRAM(S): at 05:01

## 2024-02-10 RX ADMIN — Medication 250 MILLIGRAM(S): at 05:01

## 2024-02-10 RX ADMIN — Medication 25 MILLIGRAM(S): at 05:02

## 2024-02-10 RX ADMIN — CHLORHEXIDINE GLUCONATE 1 APPLICATION(S): 213 SOLUTION TOPICAL at 11:23

## 2024-02-10 RX ADMIN — PANTOPRAZOLE SODIUM 40 MILLIGRAM(S): 20 TABLET, DELAYED RELEASE ORAL at 05:02

## 2024-02-10 RX ADMIN — Medication 650 MILLIGRAM(S): at 05:01

## 2024-02-10 RX ADMIN — Medication 2 MILLIGRAM(S): at 05:01

## 2024-02-10 RX ADMIN — Medication 15 MILLIGRAM(S): at 03:26

## 2024-02-10 RX ADMIN — Medication 15 MILLIGRAM(S): at 11:22

## 2024-02-10 RX ADMIN — OXYCODONE HYDROCHLORIDE 5 MILLIGRAM(S): 5 TABLET ORAL at 03:26

## 2024-02-10 RX ADMIN — Medication 650 MILLIGRAM(S): at 11:58

## 2024-02-10 NOTE — OCCUPATIONAL THERAPY INITIAL EVALUATION ADULT - GENERAL OBSERVATIONS, REHAB EVAL
10:13-10:40. Chart reviewed, ok to treat by Occupational Therapist as confirmed by RN Cortney, Pt left as received in OOB chair +heplock +L knee aquacel in NAD.

## 2024-02-10 NOTE — DISCHARGE NOTE NURSING/CASE MANAGEMENT/SOCIAL WORK - PATIENT PORTAL LINK FT
You can access the FollowMyHealth Patient Portal offered by [facility] by registering at the following website: [email or web address]. By joining [email or web address]’s FollowMyHealth portal, you will also be able to view your health information using other applications (apps) compatible with our system.

## 2024-02-10 NOTE — OCCUPATIONAL THERAPY INITIAL EVALUATION ADULT - LIVES WITH, PROFILE
in private home +7 steps with right sided handrail to enter +bed/bathroom on first floor +walk in shower/spouse

## 2024-02-10 NOTE — PROGRESS NOTE ADULT - SUBJECTIVE AND OBJECTIVE BOX
s/p tka  doing well  ace/webril removed  aquacel dressing c/d  leg soft, nvid, neg adamaris    Vital Signs Last 24 Hrs  T(C): 35.8 (10 Feb 2024 04:00), Max: 36.3 (09 Feb 2024 11:27)  T(F): 96.5 (10 Feb 2024 04:00), Max: 97 (09 Feb 2024 14:19)  HR: 57 (10 Feb 2024 04:00) (47 - 66)  BP: 121/63 (10 Feb 2024 04:00) (117/62 - 187/82)  BP(mean): --  RR: 18 (10 Feb 2024 04:00) (16 - 18)  SpO2: 95% (10 Feb 2024 04:00) (95% - 99%)    Parameters below as of 10 Feb 2024 04:00  Patient On (Oxygen Delivery Method): room air                          10.2   13.69 )-----------( 151      ( 10 Feb 2024 08:24 )             31.2       impression  s/p tka doing well    plan  pt  pain control - ice/meds  dvt proph - asa/checo/scd/ambulation  dc plan - doing well, likely home today after pt

## 2024-02-10 NOTE — OCCUPATIONAL THERAPY INITIAL EVALUATION ADULT - ADDITIONAL COMMENTS
PLOF obtained from Pt. Pt stated she has the following AD/DME at home from previous surgery; shower chair, cane, RW, grab bars in shower.   (-) driving

## 2024-02-10 NOTE — DISCHARGE NOTE NURSING/CASE MANAGEMENT/SOCIAL WORK - NSDCPEFALRISK_GEN_ALL_CORE
For information on Fall & Injury Prevention, visit: [email or web address] OR  [email or web address] OR  https://www.cdc.gov/steadi/patient.html

## 2024-02-10 NOTE — PROGRESS NOTE ADULT - PROVIDER SPECIALTY LIST ADULT
Orthopedics Cyclosporine Counseling:  I discussed with the patient the risks of cyclosporine including but not limited to hypertension, gingival hyperplasia,myelosuppression, immunosuppression, liver damage, kidney damage, neurotoxicity, lymphoma, and serious infections. The patient understands that monitoring is required including baseline blood pressure, CBC, CMP, lipid panel and uric acid, and then 1-2 times monthly CMP and blood pressure.

## 2024-02-10 NOTE — CONSULT NOTE ADULT - ASSESSMENT
.HOSPITALIST CONSULT for IPP History and Physical     GLAMIRIAM GRUBBS  68y, Female  Allergy: penicillin (Anaphylaxis)      CHIEF COMPLAINT: s/p left tka  (09 Feb 2024 14:15)      HPI:  68F w/ h/o osteoarthritis underwent left TKA on 2/9/24. Surgery was without reported complications. Pt sitting up in chair. Worked with PT. Pain controlled. No CP/SOB. Wants to go home.       FAMILY HISTORY:  Family history of esophageal cancer (Father)      PAST MEDICAL & SURGICAL HISTORY:  Hypertension      GERD (gastroesophageal reflux disease)      Depression      PRISCA on CPAP      H/O chest pain  10/21/22      Palpitation  10/21/22      Diverticulitis      History of lower GI bleeding      H/O cardiac arrhythmia      Anemia      Afib      Obesity with body mass index (BMI) of 30.0 to 39.9      S/P cholecystectomy      S/P appendectomy      S/P ectopic pregnancy      H/O cardiac radiofrequency ablation          SOCIAL HISTORY  Social History:      Home Medications:  enalapril-hydrochlorothiazide 5 mg-12.5 mg oral tablet: 1 tab(s) orally once a day (09 Feb 2024 10:19)      ROS  General: Denies fevers, chills, nightsweats, weight loss  HEENT: Denies headache, rhinorrhea, sore throat, eye pain  CV: Denies CP, palpitations  PULM: Denies SOB, cough  GI: Denies abdominal pain, diarrhea  : Denies dysuria, hematuria  MSK: Denies arthralgias. Mild post-op pain  SKIN: Denies rash   NEURO: Denies paresthesias, weakness  PSYCH: Denies depression    VITALS:  T(F): 97.1, Max: 97.1 (02-10-24 @ 10:38)  HR: 65  BP: 113/60  RR: 18Vital Signs Last 24 Hrs  T(C): 36.2 (10 Feb 2024 10:38), Max: 36.2 (10 Feb 2024 10:38)  T(F): 97.1 (10 Feb 2024 10:38), Max: 97.1 (10 Feb 2024 10:38)  HR: 65 (10 Feb 2024 10:38) (47 - 66)  BP: 113/60 (10 Feb 2024 10:38) (113/60 - 173/78)  BP(mean): --  RR: 18 (10 Feb 2024 10:38) (16 - 18)  SpO2: 95% (10 Feb 2024 10:38) (95% - 98%)    Parameters below as of 10 Feb 2024 04:00  Patient On (Oxygen Delivery Method): room air        PHYSICAL EXAM:  Gen: NAD, resting in bed  HEENT: Normocephalic, atraumatic  Neck: supple, no lymphadenopathy  CV: Regular rate & regular rhythm  Lungs: CTABL no wheeze  Abdomen: Soft, NTND+ BS present  Ext: Warm, well perfused no CCE  Neuro: non focal, awake, CN II-XII intact   Skin: no rash, no erythema  Psych: no SI, HI, Hallucination     TESTS & MEASUREMENTS:                        10.2   13.69 )-----------( 151      ( 10 Feb 2024 08:24 )             31.2     02-10    140  |  104  |  16  ----------------------------<  149<H>  4.1   |  28  |  0.7    Ca    9.0      10 Feb 2024 08:24          INFECTIOUS DISEASES TESTING  MRSA PCR Result.: Negative (01-23-24 @ 13:08)      RADIOLOGY & ADDITIONAL TESTS:  no new imaging         MEDICATIONS  (STANDING):  acetaminophen     Tablet .. 650 milliGRAM(s) Oral every 6 hours  aspirin enteric coated 81 milliGRAM(s) Oral every 12 hours  celecoxib 200 milliGRAM(s) Oral every 12 hours  chlorhexidine 2% Cloths 1 Application(s) Topical daily  enalapril 5 milliGRAM(s) Oral daily  hydrochlorothiazide 12.5 milliGRAM(s) Oral daily  metoprolol succinate ER 25 milliGRAM(s) Oral daily  pantoprazole    Tablet 40 milliGRAM(s) Oral before breakfast  polyethylene glycol 3350 17 Gram(s) Oral at bedtime  senna 2 Tablet(s) Oral at bedtime  sodium chloride 0.9%. 1000 milliLiter(s) (75 mL/Hr) IV Continuous <Continuous>    MEDICATIONS  (PRN):  magnesium hydroxide Suspension 30 milliLiter(s) Oral daily PRN Constipation  ondansetron Injectable 4 milliGRAM(s) IV Push every 6 hours PRN Nausea and/or Vomiting  oxyCODONE    IR 5 milliGRAM(s) Oral every 8 hours PRN Moderate Pain (4 - 6)  traMADol 50 milliGRAM(s) Oral every 4 hours PRN Mild Pain (1 - 3)      ANTIBIOTICS:      All available historical data has been reviewed    ASSESSMENT  68y F admitted with Primary osteoarthritis of left knee s/p L TKA on 2/9/2024        PROBLEMS  # s/p L TKA  - pain control  - PT    # HTN  - well controlled, cont home meds    # Leukocytosis  - likely 2/2 surgery, no signs or symptoms of infection    DVT ppx: per Ortho  Dispo: Likely home

## 2024-02-12 ENCOUNTER — TRANSCRIPTION ENCOUNTER (OUTPATIENT)
Age: 69
End: 2024-02-12

## 2024-02-20 LAB — SURGICAL PATHOLOGY STUDY: SIGNIFICANT CHANGE UP

## 2024-02-21 DIAGNOSIS — Z99.89 DEPENDENCE ON OTHER ENABLING MACHINES AND DEVICES: ICD-10-CM

## 2024-02-21 DIAGNOSIS — Z88.0 ALLERGY STATUS TO PENICILLIN: ICD-10-CM

## 2024-02-21 DIAGNOSIS — M17.12 UNILATERAL PRIMARY OSTEOARTHRITIS, LEFT KNEE: ICD-10-CM

## 2024-02-21 DIAGNOSIS — G47.33 OBSTRUCTIVE SLEEP APNEA (ADULT) (PEDIATRIC): ICD-10-CM

## 2024-02-21 DIAGNOSIS — I10 ESSENTIAL (PRIMARY) HYPERTENSION: ICD-10-CM

## 2024-02-21 DIAGNOSIS — I48.91 UNSPECIFIED ATRIAL FIBRILLATION: ICD-10-CM

## 2024-02-21 DIAGNOSIS — F32.A DEPRESSION, UNSPECIFIED: ICD-10-CM

## 2024-02-21 DIAGNOSIS — K21.9 GASTRO-ESOPHAGEAL REFLUX DISEASE WITHOUT ESOPHAGITIS: ICD-10-CM

## 2024-02-21 DIAGNOSIS — D64.9 ANEMIA, UNSPECIFIED: ICD-10-CM

## 2024-02-28 NOTE — PHYSICAL THERAPY INITIAL EVALUATION ADULT - LIVES WITH, PROFILE
Wife called and requesting referral to pain management clinic and PT at the [facility] folks for her .    spouse

## 2024-02-29 ENCOUNTER — APPOINTMENT (OUTPATIENT)
Dept: ORTHOPEDIC SURGERY | Facility: CLINIC | Age: 69
End: 2024-02-29
Payer: MEDICARE

## 2024-02-29 DIAGNOSIS — Z96.652 PRESENCE OF LEFT ARTIFICIAL KNEE JOINT: ICD-10-CM

## 2024-02-29 PROBLEM — E66.9 OBESITY, UNSPECIFIED: Chronic | Status: ACTIVE | Noted: 2024-01-31

## 2024-02-29 PROCEDURE — 99024 POSTOP FOLLOW-UP VISIT: CPT

## 2024-02-29 PROCEDURE — 73562 X-RAY EXAM OF KNEE 3: CPT | Mod: 50

## 2024-02-29 RX ORDER — TRAMADOL HYDROCHLORIDE 50 MG/1
50 TABLET, COATED ORAL
Qty: 20 | Refills: 0 | Status: ACTIVE | COMMUNITY
Start: 2024-02-29 | End: 1900-01-01

## 2024-02-29 NOTE — HISTORY OF PRESENT ILLNESS
[de-identified] : s/p Left TKA doing well postop course uncomplicated, home PT complete, progressing appropriately, now ready for OP PT.  pain controlled (-)n/v/cp/sob  Left knee exam shows well healed incision NTTP AROM 2-90 negative adamaris  Imaging:  AP and Lateral views were obtained of the knees, including the contralateral knee for comparison. X-rays are negative for acute bone or soft tissue trauma. Left knee replacement in good alignment without radiographic evidence of complication.  Plan: continue home exercise activities as tolerated OP PT continue dvt prophylaxis f/u at 6 weeks.

## 2024-03-06 ENCOUNTER — TRANSCRIPTION ENCOUNTER (OUTPATIENT)
Age: 69
End: 2024-03-06

## 2024-04-18 ENCOUNTER — APPOINTMENT (OUTPATIENT)
Dept: ORTHOPEDIC SURGERY | Facility: CLINIC | Age: 69
End: 2024-04-18

## 2024-09-18 NOTE — OCCUPATIONAL THERAPY INITIAL EVALUATION ADULT - IMPAIRED TRANSFERS: SHOWER, REHAB EVAL
bilateral upper extremity Active ROM was WFL (within functional limits)/bilateral  lower extremity Active ROM was WFL (within functional limits) impaired balance/pain/decreased ROM/decreased strength

## 2024-09-30 NOTE — CONSULT NOTE ADULT - PROVIDER SPECIALTY LIST ADULT
Problem: INFECTION - ADULT  Goal: Absence or prevention of progression during hospitalization  Description: INTERVENTIONS:  - Assess and monitor for signs and symptoms of infection  - Monitor lab/diagnostic results  - Monitor all insertion sites, i.e. indwelling lines, tubes, and drains  - Monitor endotracheal if appropriate and nasal secretions for changes in amount and color  - Graham appropriate cooling/warming therapies per order  - Administer medications as ordered  - Instruct and encourage patient and family to use good hand hygiene technique  - Identify and instruct in appropriate isolation precautions for identified infection/condition  Outcome: Progressing     Problem: SAFETY ADULT  Goal: Patient will remain free of falls  Description: INTERVENTIONS:  - Educate patient/family on patient safety including physical limitations  - Instruct patient to call for assistance with activity   - Consult OT/PT to assist with strengthening/mobility   - Keep Call bell within reach  - Keep bed low and locked with side rails adjusted as appropriate  - Keep care items and personal belongings within reach  - Initiate and maintain comfort rounds  - Make Fall Risk Sign visible to staff  - Offer Toileting every 2 Hours, in advance of need  - Initiate/Maintain bed alarm  - Apply yellow socks and bracelet for high fall risk patients  - Consider moving patient to room near nurses station  Outcome: Progressing      Hospitalist

## 2024-12-17 NOTE — OCCUPATIONAL THERAPY INITIAL EVALUATION ADULT - PHYSICAL ASSIST/NONPHYSICAL ASSIST:DRESS UPPER BODY, OT EVAL
[New Patient Visit] : a new patient visit [Other: _____] : [unfilled] [FreeTextEntry1] : Lumbar Radiculopathy supervision

## 2025-05-13 NOTE — DISCHARGE NOTE PROVIDER - DISCHARGING ATTENDING PHYSICIAN:
Subjective   The ABCs of the Annual Wellness Visit  Medicare Wellness Visit      Lucy Sousa is a 77 y.o. patient who presents for a Medicare Wellness Visit.    The following portions of the patient's history were reviewed and   updated as appropriate: allergies, current medications, past family history, past medical history, past social history, past surgical history, and problem list.    Compared to one year ago, the patient's physical   health is the same.  Compared to one year ago, the patient's mental   health is the same.    Recent Hospitalizations:  She was not admitted to the hospital during the last year.     Current Medical Providers:  Patient Care Team:  Arvin Renteria MD as PCP - General (Family Medicine)  Rosas Berg MD (Inactive) as PCP - Family Medicine  Rosas Berg MD (Inactive) as Referring Physician (Family Medicine)  Daniel Underwood MD as Cardiologist (Cardiology)  Heath Mckeon MD as Consulting Physician (Gastroenterology)  Vadim Le MD as Consulting Physician (Otolaryngology)  Ari Lee PA as Physician Assistant (Otolaryngology)  Cassie Franco PA as Referring Physician (Physician Assistant)  Kaci King MD as Obstetrician (Obstetrics and Gynecology)    Outpatient Medications Prior to Visit   Medication Sig Dispense Refill    Cholecalciferol (Vitamin D3) 25 MCG (1000 UT) capsule Take 1 capsule by mouth Daily.      cyanocobalamin (VITAMIN B-12) 50 MCG tablet tablet Take 1 tablet by mouth Daily.      furosemide (LASIX) 20 MG tablet Take 1 tablet by mouth Daily. 90 tablet 3    losartan (COZAAR) 50 MG tablet Take 1 tablet by mouth Daily. 90 tablet 2    Multiple Vitamin (MULTI VITAMIN DAILY PO) Take 1 tablet by mouth Daily.      nystatin (MYCOSTATIN) 727097 UNIT/GM cream Apply 1 application topically to the appropriate area as directed 2 (Two) Times a Day As Needed (skin yeast rash). 30 g 1    omeprazole (priLOSEC) 40 MG capsule Take 1  capsule by mouth Daily. 90 capsule 1    potassium chloride (KLOR-CON M20) 20 MEQ CR tablet Take 1 tablet by mouth 2 (Two) Times a Day. 180 tablet 2    rivaroxaban (XARELTO) 20 MG tablet Take 1 tablet by mouth Daily. 90 tablet 1    rosuvastatin (Crestor) 5 MG tablet Take 1 tablet by mouth Daily. 90 tablet 1    triamterene-hydrochlorothiazide (DYAZIDE) 37.5-25 MG per capsule Take 1 capsule by mouth Every Morning. 90 capsule 1    Zinc 50 MG capsule Take 1 tablet/day by mouth Daily.      amoxicillin-clavulanate (AUGMENTIN) 875-125 MG per tablet Take 1 tablet by mouth 2 (Two) Times a Day. 14 tablet 0    methylPREDNISolone (MEDROL) 4 MG dose pack Take as directed on package instructions. 1 each 0    sulfamethoxazole-trimethoprim (Bactrim DS) 800-160 MG per tablet Take 1 tablet by mouth 2 (Two) Times a Day. 10 tablet 0     No facility-administered medications prior to visit.     No opioid medication identified on active medication list. I have reviewed chart for other potential  high risk medication/s and harmful drug interactions in the elderly.      Aspirin is not on active medication list.  Aspirin use is contraindicated for this patient due to: current use of Xarelto.  .    Patient Active Problem List   Diagnosis    Diffuse cystic mastopathy    FH breast; Manuelito    Primary generalized (osteo)arthritis    Congestive heart failure    Depression    Anxiety    HTN (hypertension), benign    Onychomycosis    Macrocytosis- B12/folate-ok    Cardiac arrest    History of PE-ekos tx    *History of anemia: gi/benign, iron    Hypopotassemia-diuretic    Chronic fatigue    Anticoagulated    Obesity    Chronic back pain-chiropracter    SOB: #, hx PE, CHF,     Dizziness    Skin lesions-    Hyperlipemia    Squamous cell carcinoma of skin of right cheek    Hypokalemia    Hypercalcemia    History of COVID-19 (2/21/21)    Thyroid nodule 5.2022-12m    Overactive bladder    Multiple chronic diseases    Status post joint replacement  "    Advance Care Planning Advance Directive is not on file.  ACP discussion was held with the patient during this visit. Patient does not have an advance directive, information provided.            Objective   Vitals:    25 0817   BP: 134/80   Pulse: 74   SpO2: 98%   Weight: 84.8 kg (187 lb)   Height: 162.6 cm (64\")   PainSc: 0-No pain       Estimated body mass index is 32.1 kg/m² as calculated from the following:    Height as of this encounter: 162.6 cm (64\").    Weight as of this encounter: 84.8 kg (187 lb).         Gait and Balance Evaluation:  Normal      Does the patient have evidence of cognitive impairment? No                                                                                                Health  Risk Assessment    Smoking Status:  Social History     Tobacco Use   Smoking Status Never   Smokeless Tobacco Never     Alcohol Consumption:  Social History     Substance and Sexual Activity   Alcohol Use No       Fall Risk Screen  STEADI Fall Risk Assessment was completed, and patient is at LOW risk for falls.Assessment completed on:2025    Depression Screening   Little interest or pleasure in doing things? Not at all   Feeling down, depressed, or hopeless? Not at all   PHQ-2 Total Score 0      Health Habits and Functional and Cognitive Screenin/13/2025     8:13 AM   Functional & Cognitive Status   Do you have difficulty preparing food and eating? No   Do you have difficulty bathing yourself, getting dressed or grooming yourself? No   Do you have difficulty using the toilet? No   Do you have difficulty moving around from place to place? No   Do you have trouble with steps or getting out of a bed or a chair? No   Current Diet Well Balanced Diet   Dental Exam Up to date   Eye Exam Not up to date   Exercise (times per week) 3 times per week   Current Exercises Include House Cleaning;Walking   Do you need help using the phone?  No   Are you deaf or do you have serious difficulty " hearing?  No   Do you need help to go to places out of walking distance? No   Do you need help shopping? No   Do you need help preparing meals?  No   Do you need help with housework?  No   Do you need help with laundry? No   Do you need help taking your medications? No   Do you need help managing money? No   Do you ever drive or ride in a car without wearing a seat belt? No   Have you felt unusual stress, anger or loneliness in the last month? No   Who do you live with? Spouse   If you need help, do you have trouble finding someone available to you? No   Have you been bothered in the last four weeks by sexual problems? No   Do you have difficulty concentrating, remembering or making decisions? No           Age-appropriate Screening Schedule:  Refer to the list below for future screening recommendations based on patient's age, sex and/or medical conditions. Orders for these recommended tests are listed in the plan section. The patient has been provided with a written plan.    Health Maintenance List  Health Maintenance   Topic Date Due    ZOSTER VACCINE (1 of 2) Never done    COVID-19 Vaccine (6 - 2024-25 season) 09/01/2024    ANNUAL WELLNESS VISIT  02/13/2025    INFLUENZA VACCINE  07/01/2025    LIPID PANEL  08/09/2025    DXA SCAN  02/21/2026    COLORECTAL CANCER SCREENING  06/02/2028    TDAP/TD VACCINES (2 - Td or Tdap) 11/02/2030    HEPATITIS C SCREENING  Completed    RSV Vaccine - Adults  Completed    Pneumococcal Vaccine 50+  Completed    MAMMOGRAM  Discontinued    HEMOGLOBIN A1C  Discontinued    URINE MICROALBUMIN-CREATININE RATIO (uACR)  Discontinued                                                                                                                                                CMS Preventative Services Quick Reference  Risk Factors Identified During Encounter  Dental Screening Recommended  Vision Screening Recommended    The above risks/problems have been discussed with the patient.  Pertinent  information has been shared with the patient in the After Visit Summary.  An After Visit Summary and PPPS were made available to the patient.    Follow Up:   Next Medicare Wellness visit to be scheduled in 1 year.         Additional E&M Note during same encounter follows:  Patient has additional, significant, and separately identifiable condition(s)/problem(s) that require work above and beyond the Medicare Wellness Visit     Chief Complaint  Chief Complaint   Patient presents with    Medicare Wellness-subsequent    Hyperlipidemia        Subjective      History of Present Illness  The patient is a 77-year-old female who presents today for a Medicare wellness visit.    She reports no new health concerns but requests an examination of her ears due to persistent discomfort. This issue has been ongoing for several weeks, with intermittent periods of improvement.     She mentions that her sodium levels were found to be low during her last blood work, and she has not had them rechecked as previously advised. She is currently on a daily regimen of Lasix for leg swelling, which she manages well with adequate hydration. She uses a home scale for weight monitoring and maintains regular bowel movements. Excessive water intake has led to severe ankle swelling in the past, but she has been able to avoid this due to recent weight loss. However, she notes persistent swelling in one ankle. She plans to schedule an appointment with her cardiologist, whom she has not consulted in some time, to ensure her cardiac health is stable. She reports no current issues related to her heart condition.    She has noticed tremors in her hands, particularly when fatigued, and occasional head shaking when not supported by a chair. These symptoms have been present for a couple of years and were previously discussed with Dr. Berg, who advised a wait-and-see approach. The tremors do not interfere with her daily activities, although they can cause  "minor inconveniences such as spilling when measuring liquids. She reports no alcohol consumption or pain associated with the tremors.    She is on rosuvastatin for cholesterol management and reports no issues with this medication.    She has a history of depression following the loss of her son and a pulmonary embolism but does not believe it was severe. She was briefly on medication for this condition but has since discontinued it.    She plans to have her eyes rechecked for cataracts, as recommended during her last eye examination. She reports excellent eyesight and hearing for her age.    SOCIAL HISTORY  She does not drink alcohol.    FAMILY HISTORY  Her mother had essential tremor, dementia, heart problems, and stroke.          Objective   Vital Signs:  /80   Pulse 74   Ht 162.6 cm (64\")   Wt 84.8 kg (187 lb)   SpO2 98%   BMI 32.10 kg/m²     The following labs/imaging/notes were reviewed and discussed with the patient by Arvin Renteria MD on 05/13/2025:            Physical Exam  Constitutional:       General: She is not in acute distress.     Appearance: She is not ill-appearing.   Cardiovascular:      Rate and Rhythm: Normal rate and regular rhythm.      Heart sounds: Normal heart sounds. No murmur heard.     No friction rub. No gallop.   Pulmonary:      Effort: Pulmonary effort is normal. No respiratory distress.      Breath sounds: Normal breath sounds. No wheezing, rhonchi or rales.   Abdominal:      General: Abdomen is flat. Bowel sounds are normal. There is no distension.      Tenderness: There is no abdominal tenderness.   Skin:     General: Skin is warm and dry.         Physical Exam  Ears: External ear canals and tympanic membranes intact        Assessment      Diagnoses and all orders for this visit:    1. HTN (hypertension), benign (Primary)  -     Comprehensive Metabolic Panel  -     CBC & Differential; Future  -     Comprehensive Metabolic Panel; Future  -     Magnesium; Future  -     " Vitamin B12 & Folate; Future  -     Lipid Panel; Future    2. Hyponatremia  -     Comprehensive Metabolic Panel  -     CBC & Differential; Future  -     Comprehensive Metabolic Panel; Future  -     Magnesium; Future  -     Vitamin B12 & Folate; Future  -     Lipid Panel; Future    3. Mixed hyperlipidemia  -     Comprehensive Metabolic Panel  -     CBC & Differential; Future  -     Comprehensive Metabolic Panel; Future  -     Magnesium; Future  -     Vitamin B12 & Folate; Future  -     Lipid Panel; Future    4. Anticoagulated  -     Comprehensive Metabolic Panel  -     CBC & Differential; Future  -     Comprehensive Metabolic Panel; Future  -     Magnesium; Future  -     Vitamin B12 & Folate; Future  -     Lipid Panel; Future             Assessment & Plan  1. Hyponatremia.  - Sodium levels were slightly low during the last blood work, potentially due to dehydration or the use of triamterene/hydrochlorothiazide and Lasix.  - A recheck of sodium levels will be conducted today.  - If sodium levels remain low, Lasix may be administered on an as-needed basis.  - Advised to monitor weight daily and record it. If there is a weight gain of 2 pounds overnight or 3 to 4 pounds over the course of a week, Lasix should be taken that day.    2. Hypertension.  - Blood pressure is well-controlled at 134/80 mmHg.  - Currently on losartan 50 mg, triamterene/hydrochlorothiazide 37.5/25 mg, and Lasix.  - Advised to continue current medication regimen.  - Maintain adequate hydration.    3. Essential Tremor.  - Reports occasional hand tremors, especially when tired or nervous.  - Family history of essential tremor.  - Advised to reduce caffeine intake and monitor tremors.  - If tremors worsen or interfere with daily activities, further evaluation and potential medication will be considered.    4. Hyperlipidemia.  - Currently on rosuvastatin (Crestor) and reports no issues.  - Lipid panel will be rechecked before next visit to monitor  cholesterol levels.          Orders Placed This Encounter   Procedures    Comprehensive Metabolic Panel     Release to patient:   Routine Release [[id number]]    Comprehensive Metabolic Panel     Standing Status:   Future     Expected Date:   11/13/2025     Expiration Date:   5/13/2026     Release to patient:   Routine Release [[id number]]    Magnesium     Standing Status:   Future     Expected Date:   11/13/2025     Expiration Date:   8/13/2026     Release to patient:   Routine Release [[id number]]    Vitamin B12 & Folate     Standing Status:   Future     Expected Date:   11/13/2025     Expiration Date:   5/13/2026     Release to patient:   Routine Release [[id number]]    Lipid Panel     Standing Status:   Future     Expected Date:   11/13/2025     Expiration Date:   5/13/2026     Release to patient:   Routine Release [[id number]]    CBC & Differential     Standing Status:   Future     Expected Date:   11/13/2025     Expiration Date:   5/13/2026     Manual Differential:   No     Release to patient:   Routine Release [[id number]]             Follow Up   No follow-ups on file.  Patient was given instructions and counseling regarding her condition or for health maintenance advice. Please see specific information pulled into the AVS if appropriate.    Patient or patient representative verbalized consent for the use of Ambient Listening during the visit with  Arvin Renteria MD for chart documentation. 5/13/2025  08:54 CDT   Maciel Villafana MD